# Patient Record
Sex: MALE | Race: WHITE | NOT HISPANIC OR LATINO
[De-identification: names, ages, dates, MRNs, and addresses within clinical notes are randomized per-mention and may not be internally consistent; named-entity substitution may affect disease eponyms.]

---

## 2017-01-09 ENCOUNTER — APPOINTMENT (OUTPATIENT)
Dept: ELECTROPHYSIOLOGY | Facility: CLINIC | Age: 82
End: 2017-01-09

## 2017-03-31 ENCOUNTER — RX RENEWAL (OUTPATIENT)
Age: 82
End: 2017-03-31

## 2017-04-03 ENCOUNTER — APPOINTMENT (OUTPATIENT)
Dept: ELECTROPHYSIOLOGY | Facility: CLINIC | Age: 82
End: 2017-04-03

## 2017-04-24 ENCOUNTER — APPOINTMENT (OUTPATIENT)
Dept: ELECTROPHYSIOLOGY | Facility: CLINIC | Age: 82
End: 2017-04-24

## 2017-04-24 VITALS
OXYGEN SATURATION: 95 % | BODY MASS INDEX: 27.36 KG/M2 | SYSTOLIC BLOOD PRESSURE: 136 MMHG | DIASTOLIC BLOOD PRESSURE: 82 MMHG | WEIGHT: 202 LBS | HEIGHT: 72 IN | HEART RATE: 70 BPM

## 2017-07-13 ENCOUNTER — APPOINTMENT (OUTPATIENT)
Dept: ELECTROPHYSIOLOGY | Facility: CLINIC | Age: 82
End: 2017-07-13

## 2017-07-27 ENCOUNTER — APPOINTMENT (OUTPATIENT)
Dept: ELECTROPHYSIOLOGY | Facility: CLINIC | Age: 82
End: 2017-07-27
Payer: MEDICARE

## 2017-07-27 PROCEDURE — 93295 DEV INTERROG REMOTE 1/2/MLT: CPT

## 2017-09-13 ENCOUNTER — INPATIENT (INPATIENT)
Facility: HOSPITAL | Age: 82
LOS: 14 days | Discharge: HOME | End: 2017-09-28
Attending: SURGERY | Admitting: SURGERY
Payer: COMMERCIAL

## 2017-09-13 VITALS
WEIGHT: 194.01 LBS | OXYGEN SATURATION: 98 % | SYSTOLIC BLOOD PRESSURE: 131 MMHG | DIASTOLIC BLOOD PRESSURE: 81 MMHG | RESPIRATION RATE: 16 BRPM | HEIGHT: 72 IN | TEMPERATURE: 98 F | HEART RATE: 71 BPM

## 2017-09-13 LAB
APPEARANCE UR: CLEAR — SIGNIFICANT CHANGE UP
BASOPHILS # BLD AUTO: 0.1 K/UL — SIGNIFICANT CHANGE UP (ref 0–0.2)
BASOPHILS NFR BLD AUTO: 1.6 % — SIGNIFICANT CHANGE UP (ref 0–2)
BILIRUB UR-MCNC: ABNORMAL
COLOR SPEC: YELLOW — SIGNIFICANT CHANGE UP
DIFF PNL FLD: NEGATIVE — SIGNIFICANT CHANGE UP
EOSINOPHIL # BLD AUTO: 0.7 K/UL — HIGH (ref 0–0.5)
EOSINOPHIL NFR BLD AUTO: 8.8 % — HIGH (ref 0–6)
GLUCOSE UR QL: NEGATIVE MG/DL — SIGNIFICANT CHANGE UP
HCT VFR BLD CALC: 45 % — SIGNIFICANT CHANGE UP (ref 39–50)
HGB BLD-MCNC: 14.8 G/DL — SIGNIFICANT CHANGE UP (ref 13–17)
KETONES UR-MCNC: NEGATIVE — SIGNIFICANT CHANGE UP
LEUKOCYTE ESTERASE UR-ACNC: ABNORMAL
LYMPHOCYTES # BLD AUTO: 1.1 K/UL — SIGNIFICANT CHANGE UP (ref 1–3.3)
LYMPHOCYTES # BLD AUTO: 14.4 % — SIGNIFICANT CHANGE UP (ref 13–44)
MCHC RBC-ENTMCNC: 31.7 PG — SIGNIFICANT CHANGE UP (ref 27–34)
MCHC RBC-ENTMCNC: 33 GM/DL — SIGNIFICANT CHANGE UP (ref 32–36)
MCV RBC AUTO: 96.1 FL — SIGNIFICANT CHANGE UP (ref 80–100)
MONOCYTES # BLD AUTO: 1.1 K/UL — HIGH (ref 0–0.9)
MONOCYTES NFR BLD AUTO: 13.7 % — SIGNIFICANT CHANGE UP (ref 2–14)
NEUTROPHILS # BLD AUTO: 4.7 K/UL — SIGNIFICANT CHANGE UP (ref 1.8–7.4)
NEUTROPHILS NFR BLD AUTO: 61.5 % — SIGNIFICANT CHANGE UP (ref 43–77)
NITRITE UR-MCNC: NEGATIVE — SIGNIFICANT CHANGE UP
PH UR: 5 — SIGNIFICANT CHANGE UP (ref 5–8)
PLATELET # BLD AUTO: 179 K/UL — SIGNIFICANT CHANGE UP (ref 150–400)
PROT UR-MCNC: 30 MG/DL
RBC # BLD: 4.68 M/UL — SIGNIFICANT CHANGE UP (ref 4.2–5.8)
RBC # FLD: 13.1 % — SIGNIFICANT CHANGE UP (ref 11–15)
SP GR SPEC: 1.01 — SIGNIFICANT CHANGE UP (ref 1.01–1.02)
UROBILINOGEN FLD QL: 4 MG/DL
WBC # BLD: 7.7 K/UL — SIGNIFICANT CHANGE UP (ref 3.8–10.5)
WBC # FLD AUTO: 7.7 K/UL — SIGNIFICANT CHANGE UP (ref 3.8–10.5)

## 2017-09-13 PROCEDURE — 99285 EMERGENCY DEPT VISIT HI MDM: CPT

## 2017-09-13 NOTE — ED PROVIDER NOTE - MEDICAL DECISION MAKING DETAILS
general abd pain/epigastric after food with bad taste and sometimes burning, likely gerd. risk factors acs. less likely anginal equivalent. will do ekg, 1 trop, labs, ct, maalox, reassess. if wnl, willl likely d/c with gerd treatment and very close f/u for further testing including stress with pcp.

## 2017-09-13 NOTE — ED PROVIDER NOTE - OBJECTIVE STATEMENT
80 y/o male hx aicd/pacemaker, htn, hld, cad/mi sent from urgent care for ct because pt with intermittent epigastric pain and generalized abdominal discomfort lasting for 1-2 hours per day over past three days, more after eating. 3 days ago with diaphoresis after eating and discomfort that improved. Last had any discomfort ~ 7 hours PTA. No n/v/diarrhea/brbpr/melena. no hx colonoscopy/endoscopy, nsaids/steroids. Pt with hx of reflux. states does get funny taste in mouth with some burning. no cp, sob, diaphoresis.

## 2017-09-13 NOTE — ED ADULT NURSE NOTE - OBJECTIVE STATEMENT
80 yo male c/o abd pain c diaphoresis, intermittent, periumbilical, resolved spontaneously pta. began Monday, marisol Pt was sent here from his physician to have a CT for abd. pain.

## 2017-09-13 NOTE — ED ADULT TRIAGE NOTE - CHIEF COMPLAINT QUOTE
Pt was sent here from his physician to have a CT for pain in the center of his umbilicus. Pt states that the pain has occurred intermittently after eating food. Pt states the pain lasts about an hour and a half, it dissipates and then goes away, but it returns when he eats sometimes.

## 2017-09-13 NOTE — ED ADULT NURSE NOTE - CHPI ED SYMPTOMS NEG
no hematuria/no blood in stool/no fever/no chills/no nausea/no vomiting/no burning urination/no dysuria/no abdominal distension/no diarrhea

## 2017-09-13 NOTE — ED PROVIDER NOTE - CARDIAC, MLM
Normal rate, regular rhythm.  Heart sounds S1, S2.  No murmurs, rubs or gallops. left wall with aicd.

## 2017-09-13 NOTE — ED PROVIDER NOTE - PMH
BPH (Benign Prostatic Hyperplasia)    CAD (coronary artery disease)  last cath done in 1991- (no intervention, locations of lesions unknown)  Diabetes mellitus type 2, diet-controlled  diagnosed two years, diet controlled  Dyslipidemia    Hypertension    Myocardial infarct  in 1990, 1991  Pneumonia  hospitliazed two years ago on IV antibiotics for 10 days

## 2017-09-13 NOTE — ED PROVIDER NOTE - PROGRESS NOTE DETAILS
Miranda: pt to be admitted to surgery Dr. Sheffield, he is aware, zosyn for ?possible cholecystitis though needs further work up, pt not acutely infectiously sick, HD stable, no white count or fever.

## 2017-09-14 DIAGNOSIS — I25.10 ATHEROSCLEROTIC HEART DISEASE OF NATIVE CORONARY ARTERY WITHOUT ANGINA PECTORIS: ICD-10-CM

## 2017-09-14 DIAGNOSIS — R79.89 OTHER SPECIFIED ABNORMAL FINDINGS OF BLOOD CHEMISTRY: ICD-10-CM

## 2017-09-14 DIAGNOSIS — I50.22 CHRONIC SYSTOLIC (CONGESTIVE) HEART FAILURE: ICD-10-CM

## 2017-09-14 DIAGNOSIS — I10 ESSENTIAL (PRIMARY) HYPERTENSION: ICD-10-CM

## 2017-09-14 DIAGNOSIS — E11.9 TYPE 2 DIABETES MELLITUS WITHOUT COMPLICATIONS: ICD-10-CM

## 2017-09-14 DIAGNOSIS — N40.0 BENIGN PROSTATIC HYPERPLASIA WITHOUT LOWER URINARY TRACT SYMPTOMS: ICD-10-CM

## 2017-09-14 DIAGNOSIS — Z98.890 OTHER SPECIFIED POSTPROCEDURAL STATES: Chronic | ICD-10-CM

## 2017-09-14 DIAGNOSIS — I21.3 ST ELEVATION (STEMI) MYOCARDIAL INFARCTION OF UNSPECIFIED SITE: ICD-10-CM

## 2017-09-14 DIAGNOSIS — K81.9 CHOLECYSTITIS, UNSPECIFIED: ICD-10-CM

## 2017-09-14 LAB
ALBUMIN SERPL ELPH-MCNC: 3.4 G/DL — SIGNIFICANT CHANGE UP (ref 3.3–5)
ALBUMIN SERPL ELPH-MCNC: 3.6 G/DL — SIGNIFICANT CHANGE UP (ref 3.3–5)
ALP SERPL-CCNC: 219 U/L — HIGH (ref 40–120)
ALP SERPL-CCNC: 230 U/L — HIGH (ref 40–120)
ALT FLD-CCNC: 289 U/L — HIGH (ref 12–78)
ALT FLD-CCNC: 344 U/L — HIGH (ref 12–78)
ANION GAP SERPL CALC-SCNC: 11 MMOL/L — SIGNIFICANT CHANGE UP (ref 5–17)
ANION GAP SERPL CALC-SCNC: 7 MMOL/L — SIGNIFICANT CHANGE UP (ref 5–17)
APTT BLD: 30.6 SEC — SIGNIFICANT CHANGE UP (ref 27.5–37.4)
AST SERPL-CCNC: 144 U/L — HIGH (ref 15–37)
AST SERPL-CCNC: 248 U/L — HIGH (ref 15–37)
BILIRUB DIRECT SERPL-MCNC: 0.9 MG/DL — HIGH (ref 0.05–0.2)
BILIRUB INDIRECT FLD-MCNC: 2.3 MG/DL — HIGH (ref 0.2–1)
BILIRUB SERPL-MCNC: 3.2 MG/DL — HIGH (ref 0.2–1.2)
BILIRUB SERPL-MCNC: 4.9 MG/DL — HIGH (ref 0.2–1.2)
BLD GP AB SCN SERPL QL: SIGNIFICANT CHANGE UP
BUN SERPL-MCNC: 20 MG/DL — SIGNIFICANT CHANGE UP (ref 7–23)
BUN SERPL-MCNC: 22 MG/DL — SIGNIFICANT CHANGE UP (ref 7–23)
CALCIUM SERPL-MCNC: 8.5 MG/DL — SIGNIFICANT CHANGE UP (ref 8.5–10.1)
CALCIUM SERPL-MCNC: 8.8 MG/DL — SIGNIFICANT CHANGE UP (ref 8.5–10.1)
CANCER AG19-9 SERPL-ACNC: 68.5 U/ML — HIGH
CEA SERPL-MCNC: 1.5 NG/ML — SIGNIFICANT CHANGE UP (ref 0–3.8)
CHLORIDE SERPL-SCNC: 103 MMOL/L — SIGNIFICANT CHANGE UP (ref 96–108)
CHLORIDE SERPL-SCNC: 106 MMOL/L — SIGNIFICANT CHANGE UP (ref 96–108)
CK MB CFR SERPL CALC: 1.9 NG/ML — SIGNIFICANT CHANGE UP (ref 0.5–3.6)
CO2 SERPL-SCNC: 26 MMOL/L — SIGNIFICANT CHANGE UP (ref 22–31)
CO2 SERPL-SCNC: 27 MMOL/L — SIGNIFICANT CHANGE UP (ref 22–31)
CREAT SERPL-MCNC: 1.32 MG/DL — HIGH (ref 0.5–1.3)
CREAT SERPL-MCNC: 1.38 MG/DL — HIGH (ref 0.5–1.3)
GLUCOSE SERPL-MCNC: 104 MG/DL — HIGH (ref 70–99)
GLUCOSE SERPL-MCNC: 122 MG/DL — HIGH (ref 70–99)
HCT VFR BLD CALC: 46.4 % — SIGNIFICANT CHANGE UP (ref 39–50)
HGB BLD-MCNC: 15.2 G/DL — SIGNIFICANT CHANGE UP (ref 13–17)
INR BLD: 1.14 RATIO — SIGNIFICANT CHANGE UP (ref 0.88–1.16)
LIDOCAIN IGE QN: 269 U/L — SIGNIFICANT CHANGE UP (ref 73–393)
MCHC RBC-ENTMCNC: 31.3 PG — SIGNIFICANT CHANGE UP (ref 27–34)
MCHC RBC-ENTMCNC: 32.6 GM/DL — SIGNIFICANT CHANGE UP (ref 32–36)
MCV RBC AUTO: 96 FL — SIGNIFICANT CHANGE UP (ref 80–100)
PLATELET # BLD AUTO: 164 K/UL — SIGNIFICANT CHANGE UP (ref 150–400)
POTASSIUM SERPL-MCNC: 3.8 MMOL/L — SIGNIFICANT CHANGE UP (ref 3.5–5.3)
POTASSIUM SERPL-MCNC: 4 MMOL/L — SIGNIFICANT CHANGE UP (ref 3.5–5.3)
POTASSIUM SERPL-SCNC: 3.8 MMOL/L — SIGNIFICANT CHANGE UP (ref 3.5–5.3)
POTASSIUM SERPL-SCNC: 4 MMOL/L — SIGNIFICANT CHANGE UP (ref 3.5–5.3)
PROT SERPL-MCNC: 7.1 GM/DL — SIGNIFICANT CHANGE UP (ref 6–8.3)
PROT SERPL-MCNC: 7.6 GM/DL — SIGNIFICANT CHANGE UP (ref 6–8.3)
PROTHROM AB SERPL-ACNC: 12.5 SEC — SIGNIFICANT CHANGE UP (ref 9.8–12.7)
RBC # BLD: 4.84 M/UL — SIGNIFICANT CHANGE UP (ref 4.2–5.8)
RBC # FLD: 13.2 % — SIGNIFICANT CHANGE UP (ref 11–15)
SODIUM SERPL-SCNC: 140 MMOL/L — SIGNIFICANT CHANGE UP (ref 135–145)
SODIUM SERPL-SCNC: 140 MMOL/L — SIGNIFICANT CHANGE UP (ref 135–145)
TROPONIN I SERPL-MCNC: 0.04 NG/ML — SIGNIFICANT CHANGE UP (ref 0.01–0.04)
WBC # BLD: 6.1 K/UL — SIGNIFICANT CHANGE UP (ref 3.8–10.5)
WBC # FLD AUTO: 6.1 K/UL — SIGNIFICANT CHANGE UP (ref 3.8–10.5)

## 2017-09-14 PROCEDURE — 99223 1ST HOSP IP/OBS HIGH 75: CPT

## 2017-09-14 PROCEDURE — 78226 HEPATOBILIARY SYSTEM IMAGING: CPT | Mod: 26

## 2017-09-14 PROCEDURE — 74177 CT ABD & PELVIS W/CONTRAST: CPT | Mod: 26

## 2017-09-14 PROCEDURE — 93010 ELECTROCARDIOGRAM REPORT: CPT

## 2017-09-14 RX ORDER — VALSARTAN 80 MG/1
80 TABLET ORAL DAILY
Qty: 0 | Refills: 0 | Status: DISCONTINUED | OUTPATIENT
Start: 2017-09-14 | End: 2017-09-18

## 2017-09-14 RX ORDER — PIPERACILLIN AND TAZOBACTAM 4; .5 G/20ML; G/20ML
3.38 INJECTION, POWDER, LYOPHILIZED, FOR SOLUTION INTRAVENOUS ONCE
Qty: 0 | Refills: 0 | Status: COMPLETED | OUTPATIENT
Start: 2017-09-14 | End: 2017-09-14

## 2017-09-14 RX ORDER — MORPHINE SULFATE 50 MG/1
3 CAPSULE, EXTENDED RELEASE ORAL ONCE
Qty: 0 | Refills: 0 | Status: DISCONTINUED | OUTPATIENT
Start: 2017-09-14 | End: 2017-09-14

## 2017-09-14 RX ORDER — SODIUM CHLORIDE 9 MG/ML
1000 INJECTION INTRAMUSCULAR; INTRAVENOUS; SUBCUTANEOUS
Qty: 0 | Refills: 0 | Status: DISCONTINUED | OUTPATIENT
Start: 2017-09-14 | End: 2017-09-14

## 2017-09-14 RX ORDER — PANTOPRAZOLE SODIUM 20 MG/1
40 TABLET, DELAYED RELEASE ORAL
Qty: 0 | Refills: 0 | Status: DISCONTINUED | OUTPATIENT
Start: 2017-09-14 | End: 2017-09-18

## 2017-09-14 RX ORDER — TAMSULOSIN HYDROCHLORIDE 0.4 MG/1
0.4 CAPSULE ORAL AT BEDTIME
Qty: 0 | Refills: 0 | Status: DISCONTINUED | OUTPATIENT
Start: 2017-09-14 | End: 2017-09-18

## 2017-09-14 RX ORDER — DEXTROSE 50 % IN WATER 50 %
12.5 SYRINGE (ML) INTRAVENOUS ONCE
Qty: 0 | Refills: 0 | Status: DISCONTINUED | OUTPATIENT
Start: 2017-09-14 | End: 2017-09-18

## 2017-09-14 RX ORDER — ONDANSETRON 8 MG/1
4 TABLET, FILM COATED ORAL EVERY 6 HOURS
Qty: 0 | Refills: 0 | Status: DISCONTINUED | OUTPATIENT
Start: 2017-09-14 | End: 2017-09-18

## 2017-09-14 RX ORDER — PIPERACILLIN AND TAZOBACTAM 4; .5 G/20ML; G/20ML
3.38 INJECTION, POWDER, LYOPHILIZED, FOR SOLUTION INTRAVENOUS EVERY 8 HOURS
Qty: 0 | Refills: 0 | Status: DISCONTINUED | OUTPATIENT
Start: 2017-09-14 | End: 2017-09-18

## 2017-09-14 RX ORDER — INSULIN LISPRO 100/ML
VIAL (ML) SUBCUTANEOUS AT BEDTIME
Qty: 0 | Refills: 0 | Status: DISCONTINUED | OUTPATIENT
Start: 2017-09-14 | End: 2017-09-18

## 2017-09-14 RX ORDER — GLUCAGON INJECTION, SOLUTION 0.5 MG/.1ML
1 INJECTION, SOLUTION SUBCUTANEOUS ONCE
Qty: 0 | Refills: 0 | Status: DISCONTINUED | OUTPATIENT
Start: 2017-09-14 | End: 2017-09-18

## 2017-09-14 RX ORDER — HYDROCHLOROTHIAZIDE 25 MG
12.5 TABLET ORAL AT BEDTIME
Qty: 0 | Refills: 0 | Status: DISCONTINUED | OUTPATIENT
Start: 2017-09-14 | End: 2017-09-18

## 2017-09-14 RX ORDER — ASPIRIN/CALCIUM CARB/MAGNESIUM 324 MG
81 TABLET ORAL DAILY
Qty: 0 | Refills: 0 | Status: DISCONTINUED | OUTPATIENT
Start: 2017-09-14 | End: 2017-09-18

## 2017-09-14 RX ORDER — ATORVASTATIN CALCIUM 80 MG/1
10 TABLET, FILM COATED ORAL AT BEDTIME
Qty: 0 | Refills: 0 | Status: DISCONTINUED | OUTPATIENT
Start: 2017-09-14 | End: 2017-09-18

## 2017-09-14 RX ORDER — INSULIN LISPRO 100/ML
VIAL (ML) SUBCUTANEOUS
Qty: 0 | Refills: 0 | Status: DISCONTINUED | OUTPATIENT
Start: 2017-09-14 | End: 2017-09-18

## 2017-09-14 RX ORDER — HEPARIN SODIUM 5000 [USP'U]/ML
5000 INJECTION INTRAVENOUS; SUBCUTANEOUS EVERY 12 HOURS
Qty: 0 | Refills: 0 | Status: DISCONTINUED | OUTPATIENT
Start: 2017-09-14 | End: 2017-09-18

## 2017-09-14 RX ORDER — DEXTROSE 50 % IN WATER 50 %
25 SYRINGE (ML) INTRAVENOUS ONCE
Qty: 0 | Refills: 0 | Status: DISCONTINUED | OUTPATIENT
Start: 2017-09-14 | End: 2017-09-18

## 2017-09-14 RX ORDER — DEXTROSE 50 % IN WATER 50 %
1 SYRINGE (ML) INTRAVENOUS ONCE
Qty: 0 | Refills: 0 | Status: DISCONTINUED | OUTPATIENT
Start: 2017-09-14 | End: 2017-09-18

## 2017-09-14 RX ORDER — ACETAMINOPHEN 500 MG
650 TABLET ORAL EVERY 6 HOURS
Qty: 0 | Refills: 0 | Status: DISCONTINUED | OUTPATIENT
Start: 2017-09-14 | End: 2017-09-18

## 2017-09-14 RX ORDER — CARVEDILOL PHOSPHATE 80 MG/1
12.5 CAPSULE, EXTENDED RELEASE ORAL EVERY 12 HOURS
Qty: 0 | Refills: 0 | Status: DISCONTINUED | OUTPATIENT
Start: 2017-09-14 | End: 2017-09-18

## 2017-09-14 RX ORDER — SODIUM CHLORIDE 9 MG/ML
1000 INJECTION, SOLUTION INTRAVENOUS
Qty: 0 | Refills: 0 | Status: DISCONTINUED | OUTPATIENT
Start: 2017-09-14 | End: 2017-09-18

## 2017-09-14 RX ADMIN — TAMSULOSIN HYDROCHLORIDE 0.4 MILLIGRAM(S): 0.4 CAPSULE ORAL at 22:41

## 2017-09-14 RX ADMIN — PIPERACILLIN AND TAZOBACTAM 25 GRAM(S): 4; .5 INJECTION, POWDER, LYOPHILIZED, FOR SOLUTION INTRAVENOUS at 22:41

## 2017-09-14 RX ADMIN — ATORVASTATIN CALCIUM 10 MILLIGRAM(S): 80 TABLET, FILM COATED ORAL at 22:41

## 2017-09-14 RX ADMIN — SODIUM CHLORIDE 30 MILLILITER(S): 9 INJECTION, SOLUTION INTRAVENOUS at 17:03

## 2017-09-14 RX ADMIN — MORPHINE SULFATE 3 MILLIGRAM(S): 50 CAPSULE, EXTENDED RELEASE ORAL at 10:58

## 2017-09-14 RX ADMIN — Medication 12.5 MILLIGRAM(S): at 22:41

## 2017-09-14 RX ADMIN — HEPARIN SODIUM 5000 UNIT(S): 5000 INJECTION INTRAVENOUS; SUBCUTANEOUS at 17:02

## 2017-09-14 RX ADMIN — CARVEDILOL PHOSPHATE 12.5 MILLIGRAM(S): 80 CAPSULE, EXTENDED RELEASE ORAL at 17:02

## 2017-09-14 RX ADMIN — HEPARIN SODIUM 5000 UNIT(S): 5000 INJECTION INTRAVENOUS; SUBCUTANEOUS at 05:10

## 2017-09-14 RX ADMIN — PANTOPRAZOLE SODIUM 40 MILLIGRAM(S): 20 TABLET, DELAYED RELEASE ORAL at 08:14

## 2017-09-14 RX ADMIN — PIPERACILLIN AND TAZOBACTAM 25 GRAM(S): 4; .5 INJECTION, POWDER, LYOPHILIZED, FOR SOLUTION INTRAVENOUS at 14:20

## 2017-09-14 RX ADMIN — PIPERACILLIN AND TAZOBACTAM 200 GRAM(S): 4; .5 INJECTION, POWDER, LYOPHILIZED, FOR SOLUTION INTRAVENOUS at 05:06

## 2017-09-14 RX ADMIN — VALSARTAN 80 MILLIGRAM(S): 80 TABLET ORAL at 05:07

## 2017-09-14 RX ADMIN — MORPHINE SULFATE 3 MILLIGRAM(S): 50 CAPSULE, EXTENDED RELEASE ORAL at 14:00

## 2017-09-14 RX ADMIN — SODIUM CHLORIDE 30 MILLILITER(S): 9 INJECTION, SOLUTION INTRAVENOUS at 05:51

## 2017-09-14 RX ADMIN — CARVEDILOL PHOSPHATE 12.5 MILLIGRAM(S): 80 CAPSULE, EXTENDED RELEASE ORAL at 05:07

## 2017-09-14 NOTE — H&P ADULT - ATTENDING COMMENTS
I examined patient, and agree with H&P.   Patient to be treated accordingly to his symptoms and findings.  Will Follow up new labs and images  requested.  Plan discussed with patient.

## 2017-09-14 NOTE — H&P ADULT - HISTORY OF PRESENT ILLNESS
81 y/o male PMHx AICD/pacemaker, HTN, HLD, CAD/MI, acid reflux, BPH with intermittent epigastric discomfort with food intake lasting for 1-2 hours per day over past three days. Slight nausea earlier yesterday. Denies abdominal pain or discomfort now. Last BM was yesterday and normal. Patient last ate at 4pm. No hx colonoscopy/endoscopy. Patient denies fever, chills, nausea, vomiting, constipation, diarrhea, hematuria, melena, hematochezia, chest pain, shortness of breath, dizziness, palpitations. Patient denies prior incident. 81 y/o male PMHx AICD/pacemaker, HTN, HLD, CAD/MI, DM, acid reflux, BPH with intermittent epigastric discomfort with food intake lasting for 1-2 hours per day over past three days. Slight nausea earlier yesterday. Denies abdominal pain or discomfort now. Last BM was yesterday and normal. Patient last ate at 4pm. No hx colonoscopy/endoscopy. Patient denies fever, chills, nausea, vomiting, constipation, diarrhea, hematuria, melena, hematochezia, chest pain, shortness of breath, dizziness, palpitations. Patient denies prior incident. 83 y/o male PMHx AICD/pacemaker, HTN, HLD, CAD/MI, DM, EF of 25%, acid reflux, BPH with intermittent epigastric discomfort with food intake lasting for 1-2 hours per day over past three days. Slight nausea earlier yesterday. Denies abdominal pain or discomfort now. Last BM was yesterday and normal. Patient last ate at 4pm. No hx colonoscopy/endoscopy. Patient denies fever, chills, nausea, vomiting, constipation, diarrhea, hematuria, melena, hematochezia, chest pain, shortness of breath, dizziness, palpitations. Patient denies prior incident.

## 2017-09-14 NOTE — H&P ADULT - NEGATIVE GASTROINTESTINAL SYMPTOMS
no melena/no diarrhea/no abdominal pain/no jaundice/no constipation/no hematochezia/no nausea/no vomiting

## 2017-09-14 NOTE — H&P ADULT - MUSCULOSKELETAL
No joint pain, swelling or deformity; no limitation of movement details… lab results/return to ED if symptoms worsen, persist or questions arise/need for outpatient follow-up

## 2017-09-14 NOTE — H&P ADULT - NSHPLABSRESULTS_GEN_ALL_CORE
Vital Signs Last 24 Hrs  T(C): 36.6 (13 Sep 2017 23:42), Max: 36.8 (13 Sep 2017 20:27)  T(F): 97.9 (13 Sep 2017 23:42), Max: 98.2 (13 Sep 2017 20:27)  HR: 70 (13 Sep 2017 23:42) (70 - 71)  BP: 148/76 (13 Sep 2017 23:42) (131/81 - 148/76)  BP(mean): --  RR: 18 (13 Sep 2017 23:42) (16 - 18)  SpO2: 98% (13 Sep 2017 23:42) (98% - 98%)                        14.8   7.7   )-----------( 179      ( 13 Sep 2017 23:25 )             45.0   09-13    140  |  103  |  22  ----------------------------<  104<H>  3.8   |  26  |  1.32<H>    Ca    8.8      13 Sep 2017 23:25    TPro  7.6  /  Alb  3.6  /  TBili  4.9<H>  /  DBili  x   /  AST  248<H>  /  ALT  344<H>  /  AlkPhos  230<H>  09-13    CT Abdomen and Pelvis w/ IV Cont (09.14.17 @ 00:54)   IMPRESSION:  Cholelithiasis with marked irregular noncircumferential thickening of the   gallbladder walls. No hyperemia of the adjacent liver. No surrounding   adenopathy. Considerations include acute on chronic cholecystitis or   gallbladder neoplasm. MRI of the abdomen is recommended for further   evaluation.  Colonicdiverticulosis without evidence of diverticulitis.

## 2017-09-14 NOTE — CONSULT NOTE ADULT - SUBJECTIVE AND OBJECTIVE BOX
Chief Complaint:  Patient is a 82y old  Male who presents with a chief complaint of intermittent epigastric discomfort with food intake since Monday (14 Sep 2017 03:20)      HPI:83 yo WM with hx of HTN, CAD, NIDDM, EF 25% admitted with progressive abdominal pain post prandial  and found to have a abnormal CT scan with ? GB mass     Allergies:  No Known Allergies      Medications:  aluminum hydroxide/magnesium hydroxide/simethicone Suspension 30 milliLiter(s) Oral every 4 hours PRN  heparin  Injectable 5000 Unit(s) SubCutaneous every 12 hours  acetaminophen   Tablet 650 milliGRAM(s) Oral every 6 hours PRN  acetaminophen   Tablet. 650 milliGRAM(s) Oral every 6 hours PRN  ondansetron Injectable 4 milliGRAM(s) IV Push every 6 hours PRN  piperacillin/tazobactam IVPB. 3.375 Gram(s) IV Intermittent every 8 hours  aspirin  chewable 81 milliGRAM(s) Oral daily  tamsulosin 0.4 milliGRAM(s) Oral at bedtime  atorvastatin 10 milliGRAM(s) Oral at bedtime  hydrochlorothiazide 12.5 milliGRAM(s) Oral at bedtime  valsartan 80 milliGRAM(s) Oral daily  carvedilol 12.5 milliGRAM(s) Oral every 12 hours  pantoprazole    Tablet 40 milliGRAM(s) Oral before breakfast  insulin lispro (HumaLOG) corrective regimen sliding scale   SubCutaneous three times a day before meals  insulin lispro (HumaLOG) corrective regimen sliding scale   SubCutaneous at bedtime  dextrose 5%. 1000 milliLiter(s) IV Continuous <Continuous>  dextrose Gel 1 Dose(s) Oral once PRN  dextrose 50% Injectable 12.5 Gram(s) IV Push once  dextrose 50% Injectable 25 Gram(s) IV Push once  dextrose 50% Injectable 25 Gram(s) IV Push once  glucagon  Injectable 1 milliGRAM(s) IntraMuscular once PRN  dextrose 5% + sodium chloride 0.45%. 1000 milliLiter(s) IV Continuous <Continuous>      Home Medications:   * Incomplete Medication History as of 13-Sep-2017 23:18 documented in Structured Notes  · 	aspirin 81 mg oral tablet, chewable: 1 tab(s) orally once a day, Last Dose Taken:    · 	tamsulosin 0.4 mg oral capsule: 1 cap(s) orally once a day, Last Dose Taken: 12-Sep-2017 PM  · 	carvedilol 12.5 mg oral tablet: 1 tab(s) orally 2 times a day, Last Dose Taken: 13-Sep-2017 AM  · 	pravastatin 20 mg oral tablet: 1 tab(s) orally once a day (at bedtime), Last Dose Taken: 12-Sep-2017 PM  · 	hydrochlorothiazide-valsartan 12.5 mg-80 mg oral tablet: 1 tab(s) orally once a day, Last Dose Taken: 13-Sep-2017 AM  · 	omeprazole:  orally , Last Dose Taken: 10-Sep-2017     PMHX/PSHX:  Pneumonia  CAD (coronary artery disease)  AICD/PPM  Myocardial infarct  BPH (Benign Prostatic Hyperplasia)  Diabetes mellitus type 2, diet-controlled  Dyslipidemia  Hypertension  H/O prostate biopsy  Skin lesion of right leg      Family history:  No pertinent family history in first degree relatives      Social History: (-) ETOH (-) TOB    ROS:     General:  No wt loss, fevers, chills, night sweats, fatigue,   Eyes:  Good vision, no reported pain  ENT:  No sore throat, pain, runny nose, dysphagia  CV:  No pain, palpitations, hypo/hypertension  Resp:  No dyspnea, cough, tachypnea, wheezing  GI:  +pain, +nausea, +vomiting, No diarrhea, No constipation, No weight loss, No fever, No pruritis, No rectal bleeding, No tarry stools, No dysphagia,  :  No pain, bleeding, incontinence, nocturia  Muscle:  No pain, weakness  Breast:  No pain, abscess, mass, discharge  Neuro:  No weakness, tingling, memory problems  Psych:  No fatigue, insomnia, mood problems, depression  Endocrine:  No polyuria, polydipsia, cold/heat intolerance  Heme:  No petechiae, ecchymosis, easy bruisability  Skin:  No rash, tattoos, scars, edema      PHYSICAL EXAM:   Vital Signs:  Vital Signs Last 24 Hrs  T(C): 36.5 (14 Sep 2017 16:10), Max: 37.2 (14 Sep 2017 09:09)  T(F): 97.7 (14 Sep 2017 16:10), Max: 99 (14 Sep 2017 09:09)  HR: 68 (14 Sep 2017 16:10) (60 - 72)  BP: 124/74 (14 Sep 2017 16:10) (105/63 - 148/76)  BP(mean): --  RR: 15 (14 Sep 2017 16:10) (14 - 18)  SpO2: 96% (14 Sep 2017 16:10) (96% - 98%)  Daily Height in cm: 187.96 (14 Sep 2017 09:09)    Daily     GENERAL:  Appears stated age, well-groomed, well-nourished, no distress  HEENT:  NC/AT,  conjunctivae clear and pink, no thyromegaly, nodules, adenopathy, no JVD, sclera -anicteric  CHEST:  Full & symmetric excursion, no increased effort, breath sounds clear  HEART:  Regular rhythm, S1, S2, no murmur/rub/S3/S4, no abdominal bruit, no edema  ABDOMEN:  Soft, +RUQ -tender, non-distended, normoactive bowel sounds,  no masses , no hepatosplenomegaly, no signs of chronic liver disease  EXTREMITIES:  no cyanosis, clubbing or edema  SKIN:  No rash/erythema/ecchymoses/petechiae/wounds/abscess/warm/dry  NEURO:  Alert, oriented, no asterixis, no tremor, no encephalopathy  RECTAL : Deferred    LABS:                        15.2   6.1   )-----------( 164      ( 14 Sep 2017 08:08 )             46.4     -    140  |  106  |  20  ----------------------------<  122<H>  4.0   |  27  |  1.38<H>    Ca    8.5      14 Sep 2017 08:08    TPro  7.1  /  Alb  3.4  /  TBili  3.2<H>  /  DBili  .90<H>  /  AST  144<H>  /  ALT  289<H>  /  AlkPhos  219<H>      LIVER FUNCTIONS - ( 14 Sep 2017 08:08 )  Alb: 3.4 g/dL / Pro: 7.1 gm/dL / ALK PHOS: 219 U/L / ALT: 289 U/L / AST: 144 U/L / GGT: x           PT/INR - ( 14 Sep 2017 04:49 )   PT: 12.5 sec;   INR: 1.14 ratio         PTT - ( 14 Sep 2017 04:49 )  PTT:30.6 sec  Urinalysis Basic - ( 13 Sep 2017 23:27 )    Color: Yellow / Appearance: Clear / S.015 / pH: x  Gluc: x / Ketone: Negative  / Bili: Small / Urobili: 4 mg/dL   Blood: x / Protein: 30 mg/dL / Nitrite: Negative   Leuk Esterase: Trace / RBC: x / WBC 0-2   Sq Epi: x / Non Sq Epi: x / Bacteria: x      Carcinoembryonic Antigen: 1.5:   Cancer Antigen, GI Ca 19-9:  68.5:     Lipase serum 269          Imaging:  < from: CT Abdomen and Pelvis w/ IV Cont (17 @ 00:54) >  EXAM:  CT ABDOMEN AND PELVIS IC                            PROCEDURE DATE:  2017          INTERPRETATION:  CT ABDOMEN AND PELVIS DATED 2017.    CLINICAL INFORMATION:  82-year-old male with generalized abdominal pain.    TECHNIQUE:  AxialCT images through the abdomen and pelvis are acquired   with administration of intravenous contrast. Images are reformatted in   the sagittal and coronal planes. 85cc of Omnipaque 350 were administered   without event.  15cc were discarded.    The study is correlated with a prior exam from 2015.    FINDINGS:    There is mild bibasilar dependent and platelike subsegmental atelectasis.   The heart is mildly enlarged. Pacemaker leads are present in the right   ventricle.    The liver is unremarkable in appearance. There is cholelithiasis with   marked irregular noncircumferential thickening of the gallbladder walls.   There is no hyperemia of the adjacent liver. There is no surrounding   adenopathy. There is no biliary ductal dilatation. The pancreas, spleen,   adrenal glands, and kidneys are unremarkable apart from bilateral renal   cysts. The urinary bladder is under distended but unremarkable in   appearance. The prostate gland is not enlarged.    There is a small hiatus hernia. There is no bowel obstruction. There is   colonic diverticulosis without evidence of diverticulitis. The appendix   is normal. There is no pneumoperitoneum, ascites, or loculated collection.    There is no significant abdominal or pelvic lymphadenopathy. There is   heavy vascular atherosclerotic calcification of the aortoiliac tree and   abdominal aortic branches.    There is a small fat-containing umbilical and small fat-containing   bilateral inguinal hernias.    There are multilevel degenerative changes of the spine with multilevel   moderate to severe segmental canal stenosis and neural foraminal   stenosis; findings similar to the prior exam from 2015.    IMPRESSION:    Cholelithiasis with marked irregular noncircumferential thickening of the   gallbladder walls. No hyperemia of the adjacent liver. No surrounding   adenopathy. Considerations include acute on chronic cholecystitis or   gallbladder neoplasm. MRI of the abdomen is recommended for further   evaluation.    Colonic diverticulosis without evidence of diverticulitis.    JESSIKA HERNANDEZ M.D.; ATTENDING RADIOLOGIST  This document has been electronically signed. Sep 14 2017  1:16AM

## 2017-09-14 NOTE — H&P ADULT - PSH
H/O prostate biopsy  During prostate biposy, a vessel was hit and wouldn't stop bleeding so patient had emergency prostate surgery. Exact procedure unknown by patient.  Skin lesion of right leg  with biopsy - benign

## 2017-09-14 NOTE — CONSULT NOTE ADULT - SUBJECTIVE AND OBJECTIVE BOX
HPI:  HPI:  83 y/o male PMHx AICD/pacemaker, HTN, HLD, CAD/MI, DM, EF of 25%, acid reflux, BPH with intermittent epigastric discomfort with food intake lasting for 1-2 hours per day over past three days. Slight nausea earlier yesterday. Denies abdominal pain or discomfort now. Last BM was yesterday and normal. Patient last ate at 4pm. No hx colonoscopy/endoscopy. Patient denies fever, chills, nausea, vomiting, constipation, diarrhea, hematuria, melena, hematochezia, chest pain, shortness of breath, dizziness, palpitations. Patient denies prior incident. (14 Sep 2017 03:20)      Chief Complaint:  Patient is a 82y old  Male who presents with a chief complaint of intermittent epigastric discomfort with food intake since Monday (14 Sep 2017 03:20)      Review of Systems:  see above           Social History/Family History  SOCHX:   tobacco,  -  alcohol    FMHX: FA/MO  - contributory       Discussed with:  PMD, Family    Physical Exam:    Vital Signs:  Vital Signs Last 24 Hrs  T(C): 36.5 (14 Sep 2017 16:10), Max: 37.2 (14 Sep 2017 09:09)  T(F): 97.7 (14 Sep 2017 16:10), Max: 99 (14 Sep 2017 09:09)  HR: 68 (14 Sep 2017 16:10) (60 - 72)  BP: 124/74 (14 Sep 2017 16:10) (105/63 - 148/76)  BP(mean): --  RR: 15 (14 Sep 2017 16:10) (14 - 18)  SpO2: 96% (14 Sep 2017 16:10) (96% - 98%)  Daily Height in cm: 187.96 (14 Sep 2017 09:09)    Daily   I&O's Summary      Chest:  Full & symmetric excursion, no increased effort, breath sounds clear  Cardiovascular:  Regular rhythm, S1, S2, no murmur/rub/S3/S4, no carotid/femoral/abdominal bruit, radial/pedal pulses 2+, no edema  Abdomen:  tender        Laboratory:                          15.2   6.1   )-----------( 164      ( 14 Sep 2017 08:08 )             46.4     09-14    140  |  106  |  20  ----------------------------<  122<H>  4.0   |  27  |  1.38<H>    Ca    8.5      14 Sep 2017 08:08    TPro  7.1  /  Alb  3.4  /  TBili  3.2<H>  /  DBili  .90<H>  /  AST  144<H>  /  ALT  289<H>  /  AlkPhos  219<H>  09-14      CARDIAC MARKERS ( 13 Sep 2017 23:25 )  .035 ng/mL / x     / x     / x     / 1.9 ng/mL      CAPILLARY BLOOD GLUCOSE  131 (14 Sep 2017 16:11)  109 (14 Sep 2017 05:19)        LIVER FUNCTIONS - ( 14 Sep 2017 08:08 )  Alb: 3.4 g/dL / Pro: 7.1 gm/dL / ALK PHOS: 219 U/L / ALT: 289 U/L / AST: 144 U/L / GGT: x           PT/INR - ( 14 Sep 2017 04:49 )   PT: 12.5 sec;   INR: 1.14 ratio         PTT - ( 14 Sep 2017 04:49 )  PTT:30.6 sec  Urinalysis Basic - ( 13 Sep 2017 23:27 )    Color: Yellow / Appearance: Clear / S.015 / pH: x  Gluc: x / Ketone: Negative  / Bili: Small / Urobili: 4 mg/dL   Blood: x / Protein: 30 mg/dL / Nitrite: Negative   Leuk Esterase: Trace / RBC: x / WBC 0-2   Sq Epi: x / Non Sq Epi: x / Bacteria: x            Assessment:  Acute Ruthy  H/O CAD  Last cath  at that time medical therapy was recommended  Echo 2015 EF 25-30% is chronic and well managed with medical therapy  Troponin negative  CV risk is acceptable for surgical intervention,   Chronic systolic CHF is noted from chart with EF 25%, AICD already in place

## 2017-09-14 NOTE — H&P ADULT - ASSESSMENT
81 y/o male PMHx AICD/pacemaker, HTN, HLD, CAD/MI, acid reflux, BPH with intermittent epigastric discomfort with food intake lasting for 1-2 hours per day over past three days.   CT: Cholelithiasis with marked irregular noncircumferential thickening of the gallbladder walls. No hyperemia of the adjacent liver. No surrounding adenopathy. Considerations include acute on chronic cholecystitis or gallbladder neoplasm.

## 2017-09-14 NOTE — H&P ADULT - PROBLEM SELECTOR PLAN 1
-Admit patient  -HIDA in AM  -Possible MRI of abdomen  -Possible OR planning pending additional test results for laparoscopic cholecystectomy, possible open   -IV antibiotics: Zosyn  -NPO, IVF  -Pain management prn  -Zofran prn  -Dvt ppx  -f/u labs  -Risks and benefits explained to patient and patient understood.  -Will discuss with attending -Admit patient  -HIDA in AM  -Possible MRI of abdomen  -Possible OR planning pending additional test results for laparoscopic cholecystectomy, possible open   -IV antibiotics: Zosyn  -NPO, IVF  -Pain management prn  -Zofran prn  -Dvt ppx  -f/u labs, order tumor markers  -Risks and benefits explained to patient and patient understood.  -Will discuss with attending

## 2017-09-14 NOTE — CONSULT NOTE ADULT - PROBLEM SELECTOR RECOMMENDATION 5
continue to give ASA  repeat admitting EKG in am  Thank your for the courtesy of this consult, we will follow along with you.

## 2017-09-14 NOTE — H&P ADULT - GASTROINTESTINAL DETAILS
no rebound tenderness/no rigidity/no masses palpable/bowel sounds normal/soft/no guarding/nontender/no distention soft/no rigidity/no masses palpable/no guarding/no rebound tenderness/no distention/bowel sounds normal

## 2017-09-14 NOTE — CONSULT NOTE ADULT - SUBJECTIVE AND OBJECTIVE BOX
CC: I have belly pain   HPI: 83 y/o male with PMHx of HTN, CAD, Chronic Systolic CHF (EF 25%) s/p PPM, NIDDM, BPM s/p prostatectomy, and HLD that is admitted to surgical service for a 3 day history of intermittent epigastric pain that is found to have gallbladder pathology on CT scan.  Denies fevers, chills, shortness of breath, denies chest pain, headache, nausea, vomiting, or other symptom.  Denies recent weight loss or other constitutional symptom. Patient is seen and examined at bedside in ED this morning.  He is currently without somatic complaint.  ED chart reviewed.    Vitals  T(C): 36.6 (17 @ 07:44), Max: 36.8 (17 @ 20:27)  HR: 60 (17 @ 07:44) (60 - 72)  BP: 118/69 (17 @ 07:44) (118/69 - 148/76)  RR: 16 (17 @ 07:44) (14 - 18)  SpO2: 98% (17 @ 07:44) (96% - 98%)    PHYSICAL EXAM:    Constitutional: NAD   Eyes: PERRLA, EOMI  Neck: supple no JVD  Chest:  left sided ppm  Back: flexible, no CVA  Respiratory:  clear to auscultation, no wheezing, or rhonci  Cardiovascular: S1, S2 no murmurs, rubs or gallops  Gastrointestinal: soft, non-tender, + Bowel sounds  Genitourinary: not examined  Rectal:  not examined  Extremities: non-tender, no edema  Neurological: AAO x 3 no focal deficits  Skin: no rashes  Musculoskeletal: 5/5 strength throughout, normal ROM  Psychiatric: appropriate affect    Medications  aluminum hydroxide/magnesium hydroxide/simethicone Suspension 30 milliLiter(s) Oral every 4 hours PRN  heparin  Injectable 5000 Unit(s) SubCutaneous every 12 hours  acetaminophen   Tablet 650 milliGRAM(s) Oral every 6 hours PRN  acetaminophen   Tablet. 650 milliGRAM(s) Oral every 6 hours PRN  ondansetron Injectable 4 milliGRAM(s) IV Push every 6 hours PRN  piperacillin/tazobactam IVPB. 3.375 Gram(s) IV Intermittent every 8 hours  aspirin  chewable 81 milliGRAM(s) Oral daily  tamsulosin 0.4 milliGRAM(s) Oral at bedtime  atorvastatin 10 milliGRAM(s) Oral at bedtime  hydrochlorothiazide 12.5 milliGRAM(s) Oral at bedtime  valsartan 80 milliGRAM(s) Oral daily  carvedilol 12.5 milliGRAM(s) Oral every 12 hours  pantoprazole    Tablet 40 milliGRAM(s) Oral before breakfast  insulin lispro (HumaLOG) corrective regimen sliding scale   SubCutaneous three times a day before meals  insulin lispro (HumaLOG) corrective regimen sliding scale   SubCutaneous at bedtime  dextrose 5%. 1000 milliLiter(s) IV Continuous <Continuous>  dextrose Gel 1 Dose(s) Oral once PRN  dextrose 50% Injectable 12.5 Gram(s) IV Push once  dextrose 50% Injectable 25 Gram(s) IV Push once  dextrose 50% Injectable 25 Gram(s) IV Push once  glucagon  Injectable 1 milliGRAM(s) IntraMuscular once PRN  dextrose 5% + sodium chloride 0.45%. 1000 milliLiter(s) IV Continuous <Continuous>        Labs                        15.2   6.1   )-----------( 164      ( 14 Sep 2017 08:08 )             46.4     -14    140  |  106  |  20  ----------------------------<  122<H>  4.0   |  27  |  1.38<H>    Ca    8.5      14 Sep 2017 08:08  TPro  7.1  /  Alb  3.4  /  TBili  3.2<H>  /  DBili  .90<H>  /  AST  144<H>  /  ALT  289<H>  /  AlkPhos  219<H>  09-14  CARDIAC MARKERS ( 13 Sep 2017 23:25 )  .035 ng/mL / x     / x     / x     / 1.9 ng/mL    Urinalysis Basic - ( 13 Sep 2017 23:27 )  Color: Yellow / Appearance: Clear / S.015 / pH: x  Gluc: x / Ketone: Negative  / Bili: Small / Urobili: 4 mg/dL   Blood: x / Protein: 30 mg/dL / Nitrite: Negative   Leuk Esterase: Trace / RBC: x / WBC 0-2   Sq Epi: x / Non Sq Epi: x / Bacteria: x    PT/INR - ( 14 Sep 2017 04:49 )   PT: 12.5 sec;   INR: 1.14 ratio    PTT - ( 14 Sep 2017 04:49 )  PTT:30.6 sec

## 2017-09-14 NOTE — CHART NOTE - NSCHARTNOTEFT_GEN_A_CORE
Per discussion with Dr. Sheffield:   -will start pt on clear liquid diet  - cardiology consult: Dr Alford called  -despite official HIDA scan report, he feels pt has acute cholecystitis. He will review with our radiologists. Possible OR tomorrow.

## 2017-09-15 LAB
ALBUMIN SERPL ELPH-MCNC: 3.2 G/DL — LOW (ref 3.3–5)
ALP SERPL-CCNC: 180 U/L — HIGH (ref 40–120)
ALT FLD-CCNC: 189 U/L — HIGH (ref 12–78)
ANION GAP SERPL CALC-SCNC: 11 MMOL/L — SIGNIFICANT CHANGE UP (ref 5–17)
AST SERPL-CCNC: 58 U/L — HIGH (ref 15–37)
BILIRUB DIRECT SERPL-MCNC: 0.37 MG/DL — HIGH (ref 0.05–0.2)
BILIRUB INDIRECT FLD-MCNC: 1.1 MG/DL — HIGH (ref 0.2–1)
BILIRUB SERPL-MCNC: 1.5 MG/DL — HIGH (ref 0.2–1.2)
BUN SERPL-MCNC: 14 MG/DL — SIGNIFICANT CHANGE UP (ref 7–23)
CALCIUM SERPL-MCNC: 8.5 MG/DL — SIGNIFICANT CHANGE UP (ref 8.5–10.1)
CHLORIDE SERPL-SCNC: 105 MMOL/L — SIGNIFICANT CHANGE UP (ref 96–108)
CO2 SERPL-SCNC: 25 MMOL/L — SIGNIFICANT CHANGE UP (ref 22–31)
CREAT SERPL-MCNC: 1.22 MG/DL — SIGNIFICANT CHANGE UP (ref 0.5–1.3)
GLUCOSE SERPL-MCNC: 118 MG/DL — HIGH (ref 70–99)
HBA1C BLD-MCNC: 5.6 % — SIGNIFICANT CHANGE UP (ref 4–5.6)
HCT VFR BLD CALC: 42.4 % — SIGNIFICANT CHANGE UP (ref 39–50)
HGB BLD-MCNC: 14.2 G/DL — SIGNIFICANT CHANGE UP (ref 13–17)
MAGNESIUM SERPL-MCNC: 2.4 MG/DL — SIGNIFICANT CHANGE UP (ref 1.6–2.6)
MCHC RBC-ENTMCNC: 31.3 PG — SIGNIFICANT CHANGE UP (ref 27–34)
MCHC RBC-ENTMCNC: 33.6 GM/DL — SIGNIFICANT CHANGE UP (ref 32–36)
MCV RBC AUTO: 93.3 FL — SIGNIFICANT CHANGE UP (ref 80–100)
PHOSPHATE SERPL-MCNC: 2.7 MG/DL — SIGNIFICANT CHANGE UP (ref 2.5–4.5)
PLATELET # BLD AUTO: 181 K/UL — SIGNIFICANT CHANGE UP (ref 150–400)
POTASSIUM SERPL-MCNC: 3.7 MMOL/L — SIGNIFICANT CHANGE UP (ref 3.5–5.3)
POTASSIUM SERPL-SCNC: 3.7 MMOL/L — SIGNIFICANT CHANGE UP (ref 3.5–5.3)
PROT SERPL-MCNC: 7 GM/DL — SIGNIFICANT CHANGE UP (ref 6–8.3)
RBC # BLD: 4.55 M/UL — SIGNIFICANT CHANGE UP (ref 4.2–5.8)
RBC # FLD: 13.2 % — SIGNIFICANT CHANGE UP (ref 11–15)
SODIUM SERPL-SCNC: 141 MMOL/L — SIGNIFICANT CHANGE UP (ref 135–145)
WBC # BLD: 5.7 K/UL — SIGNIFICANT CHANGE UP (ref 3.8–10.5)
WBC # FLD AUTO: 5.7 K/UL — SIGNIFICANT CHANGE UP (ref 3.8–10.5)

## 2017-09-15 PROCEDURE — 99231 SBSQ HOSP IP/OBS SF/LOW 25: CPT

## 2017-09-15 RX ADMIN — SODIUM CHLORIDE 30 MILLILITER(S): 9 INJECTION, SOLUTION INTRAVENOUS at 21:10

## 2017-09-15 RX ADMIN — ATORVASTATIN CALCIUM 10 MILLIGRAM(S): 80 TABLET, FILM COATED ORAL at 21:11

## 2017-09-15 RX ADMIN — PIPERACILLIN AND TAZOBACTAM 25 GRAM(S): 4; .5 INJECTION, POWDER, LYOPHILIZED, FOR SOLUTION INTRAVENOUS at 05:47

## 2017-09-15 RX ADMIN — PIPERACILLIN AND TAZOBACTAM 25 GRAM(S): 4; .5 INJECTION, POWDER, LYOPHILIZED, FOR SOLUTION INTRAVENOUS at 21:10

## 2017-09-15 RX ADMIN — HEPARIN SODIUM 5000 UNIT(S): 5000 INJECTION INTRAVENOUS; SUBCUTANEOUS at 17:11

## 2017-09-15 RX ADMIN — PANTOPRAZOLE SODIUM 40 MILLIGRAM(S): 20 TABLET, DELAYED RELEASE ORAL at 09:11

## 2017-09-15 RX ADMIN — Medication 12.5 MILLIGRAM(S): at 21:10

## 2017-09-15 RX ADMIN — CARVEDILOL PHOSPHATE 12.5 MILLIGRAM(S): 80 CAPSULE, EXTENDED RELEASE ORAL at 17:11

## 2017-09-15 RX ADMIN — PIPERACILLIN AND TAZOBACTAM 25 GRAM(S): 4; .5 INJECTION, POWDER, LYOPHILIZED, FOR SOLUTION INTRAVENOUS at 14:45

## 2017-09-15 RX ADMIN — Medication 81 MILLIGRAM(S): at 12:25

## 2017-09-15 RX ADMIN — HEPARIN SODIUM 5000 UNIT(S): 5000 INJECTION INTRAVENOUS; SUBCUTANEOUS at 05:48

## 2017-09-15 RX ADMIN — TAMSULOSIN HYDROCHLORIDE 0.4 MILLIGRAM(S): 0.4 CAPSULE ORAL at 21:10

## 2017-09-15 NOTE — DIETITIAN INITIAL EVALUATION ADULT. - OTHER INFO
Pt seen due to RN consult for N/V>/=3days.  Pt is tolerating full liquid diet, c some c/o abdominal pain at times.  GI consult c questionable GB mass noted,

## 2017-09-15 NOTE — PROGRESS NOTE ADULT - SUBJECTIVE AND OBJECTIVE BOX
Assessment:  Acute Ruthy  H/O CAD  Last cath 2015 at that time medical therapy was recommended  Echo 2015 EF 25-30% is chronic and well managed with medical therapy  Troponin negative  CV risk is acceptable for surgical intervention,   Chronic systolic CHF is noted from chart with EF 25%, AICD already in place

## 2017-09-15 NOTE — DIETITIAN INITIAL EVALUATION ADULT. - PERTINENT MEDS FT
ABX , lispro corrective regimen sliding scale 3 x day before meals & bedtime , pantoprazole , hydrochlorothiazide , valsartan , carvedilol , atorvastatin , ondansetron prn for N/V q 6 hours , aluminum hydroxide/magnesium hydroxide/simethicone suspension prn for indigestion

## 2017-09-15 NOTE — PROGRESS NOTE ADULT - SUBJECTIVE AND OBJECTIVE BOX
INTERVAL HPI/OVERNIGHT EVENTS:    Patient lying comfortably.  No complaint of abdominal pain.  Tolerating full liquid diet with no complaint of nausea/vomiting.  +Flatus/BM.  Appreciate Cardiac/GI input.      Vital Signs Last 24 Hrs  T(C): 37 (15 Sep 2017 11:08), Max: 37 (15 Sep 2017 11:08)  T(F): 98.6 (15 Sep 2017 11:08), Max: 98.6 (15 Sep 2017 11:08)  HR: 68 (15 Sep 2017 11:08) (61 - 73)  BP: 143/72 (15 Sep 2017 11:08) (100/73 - 143/72)  BP(mean): --  RR: 16 (15 Sep 2017 11:08) (15 - 18)  SpO2: 98% (15 Sep 2017 11:08) (95% - 98%)    MEDICATIONS  (STANDING):  heparin  Injectable 5000 Unit(s) SubCutaneous every 12 hours  piperacillin/tazobactam IVPB. 3.375 Gram(s) IV Intermittent every 8 hours  aspirin  chewable 81 milliGRAM(s) Oral daily  tamsulosin 0.4 milliGRAM(s) Oral at bedtime  atorvastatin 10 milliGRAM(s) Oral at bedtime  hydrochlorothiazide 12.5 milliGRAM(s) Oral at bedtime  valsartan 80 milliGRAM(s) Oral daily  carvedilol 12.5 milliGRAM(s) Oral every 12 hours  pantoprazole    Tablet 40 milliGRAM(s) Oral before breakfast  insulin lispro (HumaLOG) corrective regimen sliding scale   SubCutaneous three times a day before meals  insulin lispro (HumaLOG) corrective regimen sliding scale   SubCutaneous at bedtime  dextrose 5%. 1000 milliLiter(s) (50 mL/Hr) IV Continuous <Continuous>  dextrose 50% Injectable 12.5 Gram(s) IV Push once  dextrose 50% Injectable 25 Gram(s) IV Push once  dextrose 50% Injectable 25 Gram(s) IV Push once  dextrose 5% + sodium chloride 0.45%. 1000 milliLiter(s) (30 mL/Hr) IV Continuous <Continuous>    MEDICATIONS  (PRN):  aluminum hydroxide/magnesium hydroxide/simethicone Suspension 30 milliLiter(s) Oral every 4 hours PRN Dyspepsia  acetaminophen   Tablet 650 milliGRAM(s) Oral every 6 hours PRN For Temp greater than 38 C (100.4 F)  acetaminophen   Tablet. 650 milliGRAM(s) Oral every 6 hours PRN Mild Pain (1 - 3)  ondansetron Injectable 4 milliGRAM(s) IV Push every 6 hours PRN Nausea  dextrose Gel 1 Dose(s) Oral once PRN Blood Glucose LESS THAN 70 milliGRAM(s)/deciliter  glucagon  Injectable 1 milliGRAM(s) IntraMuscular once PRN Glucose LESS THAN 70 milligrams/deciliter      PHYSICAL EXAM:    GENERAL: NAD  HEAD:  Atraumatic, Normocephalic  EYES: EOMI, PERRLA, conjunctiva and sclera clear  CHEST/LUNG: Clear to ausculation, bilaterally   HEART: S1S2  ABDOMEN: non distended, +BS, soft, non tender, no guarding  EXTREMITIES:  calf soft, non tender     I&O's Detail    14 Sep 2017 07:01  -  15 Sep 2017 07:00  --------------------------------------------------------  IN:    dextrose 5% + sodium chloride 0.45%.: 660 mL    Oral Fluid: 440 mL    Solution: 300 mL  Total IN: 1400 mL    OUT:    Voided: 1000 mL  Total OUT: 1000 mL    Total NET: 400 mL      15 Sep 2017 07:01  -  15 Sep 2017 14:23  --------------------------------------------------------  IN:  Total IN: 0 mL    OUT:    Voided: 1100 mL  Total OUT: 1100 mL    Total NET: -1100 mL          LABS:                        14.2   5.7   )-----------( 181      ( 15 Sep 2017 08:10 )             42.4     -15    141  |  105  |  14  ----------------------------<  118<H>  3.7   |  25  |  1.22    Ca    8.5      15 Sep 2017 08:10  Phos  2.7     09-15  Mg     2.4     -15    TPro  7.0  /  Alb  3.2<L>  /  TBili  1.5<H>  /  DBili  .37<H>  /  AST  58<H>  /  ALT  189<H>  /  AlkPhos  180<H>  -15    PT/INR - ( 14 Sep 2017 04:49 )   PT: 12.5 sec;   INR: 1.14 ratio         PTT - ( 14 Sep 2017 04:49 )  PTT:30.6 sec  Urinalysis Basic - ( 13 Sep 2017 23:27 )    Color: Yellow / Appearance: Clear / S.015 / pH: x  Gluc: x / Ketone: Negative  / Bili: Small / Urobili: 4 mg/dL   Blood: x / Protein: 30 mg/dL / Nitrite: Negative   Leuk Esterase: Trace / RBC: x / WBC 0-2   Sq Epi: x / Non Sq Epi: x / Bacteria: x      Impression:    81 y/o male with PMHx of HTN, CAD, Chronic Systolic CHF (EF 25%) s/p PPM, NIDDM, BPM s/p prostatectomy, and HLD with GB Mass /elevated LFT/      Plan:  Discuss case with Dr. Sheffield Re:  Timing of surgical intervention.    He will discuss case with patient and family -- for surgery planning.   will give Low Fat diet for now NPO p MN poss OR tomorrow   continue medical management/supportive care   Prophylactic measure   GI/Cardio follow up INTERVAL HPI/OVERNIGHT EVENTS:    Patient lying comfortably.  No complaint of abdominal pain.  Tolerating full liquid diet with no complaint of nausea/vomiting.  +Flatus/BM.  Appreciate Cardiac/GI input.      Vital Signs Last 24 Hrs  T(C): 37 (15 Sep 2017 11:08), Max: 37 (15 Sep 2017 11:08)  T(F): 98.6 (15 Sep 2017 11:08), Max: 98.6 (15 Sep 2017 11:08)  HR: 68 (15 Sep 2017 11:08) (61 - 73)  BP: 143/72 (15 Sep 2017 11:08) (100/73 - 143/72)  BP(mean): --  RR: 16 (15 Sep 2017 11:08) (15 - 18)  SpO2: 98% (15 Sep 2017 11:08) (95% - 98%)    MEDICATIONS  (STANDING):  heparin  Injectable 5000 Unit(s) SubCutaneous every 12 hours  piperacillin/tazobactam IVPB. 3.375 Gram(s) IV Intermittent every 8 hours  aspirin  chewable 81 milliGRAM(s) Oral daily  tamsulosin 0.4 milliGRAM(s) Oral at bedtime  atorvastatin 10 milliGRAM(s) Oral at bedtime  hydrochlorothiazide 12.5 milliGRAM(s) Oral at bedtime  valsartan 80 milliGRAM(s) Oral daily  carvedilol 12.5 milliGRAM(s) Oral every 12 hours  pantoprazole    Tablet 40 milliGRAM(s) Oral before breakfast  insulin lispro (HumaLOG) corrective regimen sliding scale   SubCutaneous three times a day before meals  insulin lispro (HumaLOG) corrective regimen sliding scale   SubCutaneous at bedtime  dextrose 5%. 1000 milliLiter(s) (50 mL/Hr) IV Continuous <Continuous>  dextrose 50% Injectable 12.5 Gram(s) IV Push once  dextrose 50% Injectable 25 Gram(s) IV Push once  dextrose 50% Injectable 25 Gram(s) IV Push once  dextrose 5% + sodium chloride 0.45%. 1000 milliLiter(s) (30 mL/Hr) IV Continuous <Continuous>    MEDICATIONS  (PRN):  aluminum hydroxide/magnesium hydroxide/simethicone Suspension 30 milliLiter(s) Oral every 4 hours PRN Dyspepsia  acetaminophen   Tablet 650 milliGRAM(s) Oral every 6 hours PRN For Temp greater than 38 C (100.4 F)  acetaminophen   Tablet. 650 milliGRAM(s) Oral every 6 hours PRN Mild Pain (1 - 3)  ondansetron Injectable 4 milliGRAM(s) IV Push every 6 hours PRN Nausea  dextrose Gel 1 Dose(s) Oral once PRN Blood Glucose LESS THAN 70 milliGRAM(s)/deciliter  glucagon  Injectable 1 milliGRAM(s) IntraMuscular once PRN Glucose LESS THAN 70 milligrams/deciliter      PHYSICAL EXAM:    GENERAL: NAD  HEAD:  Atraumatic, Normocephalic  EYES: EOMI, PERRLA, conjunctiva and sclera clear  CHEST/LUNG: Clear to ausculation, bilaterally   HEART: S1S2  ABDOMEN: non distended, +BS, soft, non tender, no guarding  EXTREMITIES:  calf soft, non tender     I&O's Detail    14 Sep 2017 07:01  -  15 Sep 2017 07:00  --------------------------------------------------------  IN:    dextrose 5% + sodium chloride 0.45%.: 660 mL    Oral Fluid: 440 mL    Solution: 300 mL  Total IN: 1400 mL    OUT:    Voided: 1000 mL  Total OUT: 1000 mL    Total NET: 400 mL      15 Sep 2017 07:01  -  15 Sep 2017 14:23  --------------------------------------------------------  IN:  Total IN: 0 mL    OUT:    Voided: 1100 mL  Total OUT: 1100 mL    Total NET: -1100 mL          LABS:                        14.2   5.7   )-----------( 181      ( 15 Sep 2017 08:10 )             42.4     -15    141  |  105  |  14  ----------------------------<  118<H>  3.7   |  25  |  1.22    Ca    8.5      15 Sep 2017 08:10  Phos  2.7     09-15  Mg     2.4     -15    TPro  7.0  /  Alb  3.2<L>  /  TBili  1.5<H>  /  DBili  .37<H>  /  AST  58<H>  /  ALT  189<H>  /  AlkPhos  180<H>  09-15    PT/INR - ( 14 Sep 2017 04:49 )   PT: 12.5 sec;   INR: 1.14 ratio         PTT - ( 14 Sep 2017 04:49 )  PTT:30.6 sec  Urinalysis Basic - ( 13 Sep 2017 23:27 )    Color: Yellow / Appearance: Clear / S.015 / pH: x  Gluc: x / Ketone: Negative  / Bili: Small / Urobili: 4 mg/dL   Blood: x / Protein: 30 mg/dL / Nitrite: Negative   Leuk Esterase: Trace / RBC: x / WBC 0-2   Sq Epi: x / Non Sq Epi: x / Bacteria: x      Impression:    83 y/o male with PMHx of HTN, CAD, Chronic Systolic CHF (EF 25%) s/p PPM, NIDDM, BPM s/p prostatectomy, and HLD with GB Mass /elevated LFT/.  R/o Gallbladder Carcinoma.      Plan:  Discuss case with Dr. Sheffield Re:  Timing of surgical intervention.    He will discuss case with patient and family -- for surgery planning.   will give Low Fat diet for now NPO p MN poss OR tomorrow   continue medical management/supportive care   Prophylactic measure   GI/Cardio follow up

## 2017-09-15 NOTE — CONSULT NOTE ADULT - SUBJECTIVE AND OBJECTIVE BOX
CC: I have belly pain   HPI: 83 y/o male with PMHx of HTN, CAD, Chronic Systolic CHF (EF 25%) s/p PPM, NIDDM, BPM s/p prostatectomy, and HLD that is admitted to surgical service for a 3 day history of intermittent epigastric pain that is found to have gallbladder pathology on CT scan.  Denies fevers, chills, shortness of breath, denies chest pain, headache, nausea, vomiting, or other symptom.  Denies recent weight loss or other constitutional symptom. Patient is seen and examined at bedside in ED this morning.  He is currently without somatic complaint.  ED chart reviewed.      MEDICATIONS  (STANDING):  heparin  Injectable 5000 Unit(s) SubCutaneous every 12 hours  piperacillin/tazobactam IVPB. 3.375 Gram(s) IV Intermittent every 8 hours  aspirin  chewable 81 milliGRAM(s) Oral daily  tamsulosin 0.4 milliGRAM(s) Oral at bedtime  atorvastatin 10 milliGRAM(s) Oral at bedtime  hydrochlorothiazide 12.5 milliGRAM(s) Oral at bedtime  valsartan 80 milliGRAM(s) Oral daily  carvedilol 12.5 milliGRAM(s) Oral every 12 hours  pantoprazole    Tablet 40 milliGRAM(s) Oral before breakfast  insulin lispro (HumaLOG) corrective regimen sliding scale   SubCutaneous three times a day before meals  insulin lispro (HumaLOG) corrective regimen sliding scale   SubCutaneous at bedtime  dextrose 5%. 1000 milliLiter(s) (50 mL/Hr) IV Continuous <Continuous>  dextrose 50% Injectable 12.5 Gram(s) IV Push once  dextrose 50% Injectable 25 Gram(s) IV Push once  dextrose 50% Injectable 25 Gram(s) IV Push once  dextrose 5% + sodium chloride 0.45%. 1000 milliLiter(s) (30 mL/Hr) IV Continuous <Continuous>    MEDICATIONS  (PRN):  aluminum hydroxide/magnesium hydroxide/simethicone Suspension 30 milliLiter(s) Oral every 4 hours PRN Dyspepsia  acetaminophen   Tablet 650 milliGRAM(s) Oral every 6 hours PRN For Temp greater than 38 C (100.4 F)  acetaminophen   Tablet. 650 milliGRAM(s) Oral every 6 hours PRN Mild Pain (1 - 3)  ondansetron Injectable 4 milliGRAM(s) IV Push every 6 hours PRN Nausea  dextrose Gel 1 Dose(s) Oral once PRN Blood Glucose LESS THAN 70 milliGRAM(s)/deciliter  glucagon  Injectable 1 milliGRAM(s) IntraMuscular once PRN Glucose LESS THAN 70 milligrams/deciliter    Vital Signs Last 24 Hrs  T(C): 37 (15 Sep 2017 11:08), Max: 37 (15 Sep 2017 11:08)  T(F): 98.6 (15 Sep 2017 11:08), Max: 98.6 (15 Sep 2017 11:08)  HR: 68 (15 Sep 2017 11:08) (61 - 73)  BP: 143/72 (15 Sep 2017 11:08) (100/73 - 143/72)  BP(mean): --  RR: 16 (15 Sep 2017 11:08) (15 - 18)  SpO2: 98% (15 Sep 2017 11:08) (95% - 98%)    PHYSICAL EXAM:    Labs                        15.2   6.1   )-----------( 164      ( 14 Sep 2017 08:08 )             46.4     -14    140  |  106  |  20  ----------------------------<  122<H>  4.0   |  27  |  1.38<H>    Ca    8.5      14 Sep 2017 08:08  TPro  7.1  /  Alb  3.4  /  TBili  3.2<H>  /  DBili  .90<H>  /  AST  144<H>  /  ALT  289<H>  /  AlkPhos  219<H>  09-14  CARDIAC MARKERS ( 13 Sep 2017 23:25 )  .035 ng/mL / x     / x     / x     / 1.9 ng/mL    Urinalysis Basic - ( 13 Sep 2017 23:27 )  Color: Yellow / Appearance: Clear / S.015 / pH: x  Gluc: x / Ketone: Negative  / Bili: Small / Urobili: 4 mg/dL   Blood: x / Protein: 30 mg/dL / Nitrite: Negative   Leuk Esterase: Trace / RBC: x / WBC 0-2   Sq Epi: x / Non Sq Epi: x / Bacteria: x    PT/INR - ( 14 Sep 2017 04:49 )   PT: 12.5 sec;   INR: 1.14 ratio    PTT - ( 14 Sep 2017 04:49 )  PTT:30.6 sec

## 2017-09-15 NOTE — DIETITIAN INITIAL EVALUATION ADULT. - ADHERENCE
fair/Low sodium , limited sweets , sugar, reports hx of diet controlled diabetes, wife did the shopping & cooking PTA

## 2017-09-15 NOTE — DIETITIAN INITIAL EVALUATION ADULT. - NS AS NUTRI INTERV MEALS SNACK
Recommend full liquid consistent carbohydrate & advance diet when appropriate to low fat, low sodium , consistent carbohydrate c evening snack/[place]eral - modified diet/Carbohydrate - modified diet/Fat - modified diet

## 2017-09-15 NOTE — DIETITIAN INITIAL EVALUATION ADULT. - ENERGY NEEDS
Height (cm): 182.88 (09-15)  Weight (kg): 88 (09-14)  BMI (kg/m2): 26.3 (09-15)  IBW: 80.7 kg  % IBW: 109%  UBW: 87.9 kg    %UBW: 100%

## 2017-09-15 NOTE — DIETITIAN INITIAL EVALUATION ADULT. - PERTINENT LABORATORY DATA
09-15 Na141 mmol/L Glu 118 mg/dL<H> K+ 3.7 mmol/L Cr  1.22 mg/dL BUN 14 mg/dL  Est GFR 55 mL/min/1.73M2<L> Phos 2.7 mg/dL Ca 8.5 mg/dL Alb 3.2 g/dL<L> LFTs <H>  Hgb 14.2 g/dL Hct 42.4 % 09-14 cancer antigen 68.5< H>

## 2017-09-15 NOTE — PROGRESS NOTE ADULT - SUBJECTIVE AND OBJECTIVE BOX
Gastroenterolgy  Patient seen and examined bedside resting comfortably.  No complaints offered. Reports no Abdominal pain.  Denies nausea and vomiting. Tolerating diet.  No flatus/BM.     T(F): 98.6 (09-15-17 @ 11:08), Max: 98.6 (09-15-17 @ 11:08)  HR: 68 (09-15-17 @ 11:08) (61 - 73)  BP: 143/72 (09-15-17 @ 11:08) (100/73 - 143/72)  RR: 16 (09-15-17 @ 11:08) (15 - 18)  SpO2: 98% (09-15-17 @ 11:08) (95% - 98%)  Wt(kg): --  CAPILLARY BLOOD GLUCOSE  129 (15 Sep 2017 12:23)  120 (15 Sep 2017 09:10)  103 (14 Sep 2017 22:50)  131 (14 Sep 2017 16:11)          PHYSICAL EXAM:  General: NAD, WDWN.   Neuro:  Alert & oriented x 3  HEENT: NCAT, EOMI, conjunctiva clear  CV: +S1+S2 regular rate and rhythm  Lung: clear to ausculation bilaterally, respirations nonlabored, good inspiratory effort  Abdomen: soft, Non tender, No Distension. Normal active BS  Extremities: no pedal edema or calf tenderness noted     LABS:                        14.2   5.7   )-----------( 181      ( 15 Sep 2017 08:10 )             42.4     09-15    141  |  105  |  14  ----------------------------<  118<H>  3.7   |  25  |  1.22    Ca    8.5      15 Sep 2017 08:10  Phos  2.7     09-15  Mg     2.4     09-15    TPro  7.0  /  Alb  3.2<L>  /  TBili  1.5<H>  /  DBili  .37<H>  /  AST  58<H>  /  ALT  189<H>  /  AlkPhos  180<H>  09-15    LIVER FUNCTIONS - ( 15 Sep 2017 08:10 )  Alb: 3.2 g/dL / Pro: 7.0 gm/dL / ALK PHOS: 180 U/L / ALT: 189 U/L / AST: 58 U/L / GGT: x           PT/INR - ( 14 Sep 2017 04:49 )   PT: 12.5 sec;   INR: 1.14 ratio         PTT - ( 14 Sep 2017 04:49 )  PTT:30.6 sec  I&O's Detail    14 Sep 2017 07:01  -  15 Sep 2017 07:00  --------------------------------------------------------  IN:    dextrose 5% + sodium chloride 0.45%.: 660 mL    Oral Fluid: 440 mL    Solution: 300 mL  Total IN: 1400 mL    OUT:    Voided: 1000 mL  Total OUT: 1000 mL    Total NET: 400 mL      15 Sep 2017 07:01  -  15 Sep 2017 13:22  --------------------------------------------------------  IN:  Total IN: 0 mL    OUT:    Voided: 1100 mL  Total OUT: 1100 mL    Total NET: -1100 mL        09-15 @ 08:10    141 | 105 | 14  /8.5 | 2.4 | 2.7  _______________________/  3.7 | 25 | 1.22                           \par         EXAM:  NM HEPATOBILIARY IMG                        PROCEDURE DATE:  09/14/2017       INTERPRETATION:  CLINICAL INFORMATION: 82 year-old male with generalized   abdominal pain, cholelithiasis and irregular thickening of the   gallbladder wallon CT, referred to evaluate for acute cholecystitis.    RADIOPHARMACEUTICAL: 3.0 mCi Tc-99m-Mebrofenin, I.V.; 2 doses    TECHNIQUE: Dynamic imaging of the anterior abdomen was performed for 1   hour following injection of radiotracer. Morphine 3.0 mgI.V. and a   second dose of radiotracer were administered at 1 hour.  Dynamic imaging   was continued for 1 hour followed by static images of the abdomen in the   anterior, right lateral and right anterior oblique projections.    COMPARISON: No previous hepatobiliary scan for comparison.    FINDINGS: There is prompt, homogeneous uptake of radiotracer by the   hepatocytes. Activity is first seen in the bowel at 20 minutes. The   gallbladder is not visualized in the first hour of imaging, but was seen   50 minutes after the administration of morphine. There is good clearance   of activity from the liver at the end of the study.    IMPRESSION: Normal morphine-augmented hepatobiliary scan.    No evidence of acute cholecystitis.    ALPESH BLACK M.D., NUCLEAR MEDICINE ATTENDING  This document has been electronically signed. Sep 14 2017 12:34PM

## 2017-09-15 NOTE — DIETITIAN INITIAL EVALUATION ADULT. - PROBLEM SELECTOR PLAN 1
-Admit patient  -HIDA in AM  -Possible MRI of abdomen  -Possible OR planning pending additional test results for laparoscopic cholecystectomy, possible open   -IV antibiotics: Zosyn  -NPO, IVF  -Pain management prn  -Zofran prn  -Dvt ppx  -f/u labs, order tumor markers  -Risks and benefits explained to patient and patient understood.  -Will discuss with attending

## 2017-09-15 NOTE — DIETITIAN INITIAL EVALUATION ADULT. - NS AS NUTRI INTERV MEDICAL AND FOOD SUPPLEMENTS
Commercial beverage/Recommend  Glucerna Shake 2 x daily= 200 calories, 20 grams protein daily & 14 grams fat daily while on full liquid diet

## 2017-09-16 LAB
ANION GAP SERPL CALC-SCNC: 11 MMOL/L — SIGNIFICANT CHANGE UP (ref 5–17)
BUN SERPL-MCNC: 13 MG/DL — SIGNIFICANT CHANGE UP (ref 7–23)
CALCIUM SERPL-MCNC: 8.5 MG/DL — SIGNIFICANT CHANGE UP (ref 8.5–10.1)
CHLORIDE SERPL-SCNC: 107 MMOL/L — SIGNIFICANT CHANGE UP (ref 96–108)
CO2 SERPL-SCNC: 24 MMOL/L — SIGNIFICANT CHANGE UP (ref 22–31)
CREAT SERPL-MCNC: 1.31 MG/DL — HIGH (ref 0.5–1.3)
GLUCOSE SERPL-MCNC: 109 MG/DL — HIGH (ref 70–99)
HCT VFR BLD CALC: 45.7 % — SIGNIFICANT CHANGE UP (ref 39–50)
HGB BLD-MCNC: 15.2 G/DL — SIGNIFICANT CHANGE UP (ref 13–17)
MAGNESIUM SERPL-MCNC: 2.3 MG/DL — SIGNIFICANT CHANGE UP (ref 1.6–2.6)
MCHC RBC-ENTMCNC: 31.1 PG — SIGNIFICANT CHANGE UP (ref 27–34)
MCHC RBC-ENTMCNC: 33.2 GM/DL — SIGNIFICANT CHANGE UP (ref 32–36)
MCV RBC AUTO: 93.7 FL — SIGNIFICANT CHANGE UP (ref 80–100)
PHOSPHATE SERPL-MCNC: 3.3 MG/DL — SIGNIFICANT CHANGE UP (ref 2.5–4.5)
PLATELET # BLD AUTO: 167 K/UL — SIGNIFICANT CHANGE UP (ref 150–400)
POTASSIUM SERPL-MCNC: 3.8 MMOL/L — SIGNIFICANT CHANGE UP (ref 3.5–5.3)
POTASSIUM SERPL-SCNC: 3.8 MMOL/L — SIGNIFICANT CHANGE UP (ref 3.5–5.3)
RBC # BLD: 4.88 M/UL — SIGNIFICANT CHANGE UP (ref 4.2–5.8)
RBC # FLD: 13.3 % — SIGNIFICANT CHANGE UP (ref 11–15)
SODIUM SERPL-SCNC: 142 MMOL/L — SIGNIFICANT CHANGE UP (ref 135–145)
WBC # BLD: 6.6 K/UL — SIGNIFICANT CHANGE UP (ref 3.8–10.5)
WBC # FLD AUTO: 6.6 K/UL — SIGNIFICANT CHANGE UP (ref 3.8–10.5)

## 2017-09-16 RX ADMIN — TAMSULOSIN HYDROCHLORIDE 0.4 MILLIGRAM(S): 0.4 CAPSULE ORAL at 21:58

## 2017-09-16 RX ADMIN — PIPERACILLIN AND TAZOBACTAM 25 GRAM(S): 4; .5 INJECTION, POWDER, LYOPHILIZED, FOR SOLUTION INTRAVENOUS at 13:37

## 2017-09-16 RX ADMIN — Medication 81 MILLIGRAM(S): at 12:16

## 2017-09-16 RX ADMIN — PIPERACILLIN AND TAZOBACTAM 25 GRAM(S): 4; .5 INJECTION, POWDER, LYOPHILIZED, FOR SOLUTION INTRAVENOUS at 21:58

## 2017-09-16 RX ADMIN — PANTOPRAZOLE SODIUM 40 MILLIGRAM(S): 20 TABLET, DELAYED RELEASE ORAL at 05:25

## 2017-09-16 RX ADMIN — HEPARIN SODIUM 5000 UNIT(S): 5000 INJECTION INTRAVENOUS; SUBCUTANEOUS at 17:04

## 2017-09-16 RX ADMIN — Medication 12.5 MILLIGRAM(S): at 21:58

## 2017-09-16 RX ADMIN — ATORVASTATIN CALCIUM 10 MILLIGRAM(S): 80 TABLET, FILM COATED ORAL at 21:58

## 2017-09-16 RX ADMIN — HEPARIN SODIUM 5000 UNIT(S): 5000 INJECTION INTRAVENOUS; SUBCUTANEOUS at 05:25

## 2017-09-16 RX ADMIN — CARVEDILOL PHOSPHATE 12.5 MILLIGRAM(S): 80 CAPSULE, EXTENDED RELEASE ORAL at 17:04

## 2017-09-16 RX ADMIN — PIPERACILLIN AND TAZOBACTAM 25 GRAM(S): 4; .5 INJECTION, POWDER, LYOPHILIZED, FOR SOLUTION INTRAVENOUS at 05:24

## 2017-09-16 NOTE — PROGRESS NOTE ADULT - SUBJECTIVE AND OBJECTIVE BOX
Pt feeling better  no c/o abd pain - scheduled for cholecystitis on Monday    MEDICATIONS  (STANDING):  heparin  Injectable 5000 Unit(s) SubCutaneous every 12 hours  piperacillin/tazobactam IVPB. 3.375 Gram(s) IV Intermittent every 8 hours  aspirin  chewable 81 milliGRAM(s) Oral daily  tamsulosin 0.4 milliGRAM(s) Oral at bedtime  atorvastatin 10 milliGRAM(s) Oral at bedtime  hydrochlorothiazide 12.5 milliGRAM(s) Oral at bedtime  valsartan 80 milliGRAM(s) Oral daily  carvedilol 12.5 milliGRAM(s) Oral every 12 hours  pantoprazole    Tablet 40 milliGRAM(s) Oral before breakfast  insulin lispro (HumaLOG) corrective regimen sliding scale   SubCutaneous three times a day before meals  insulin lispro (HumaLOG) corrective regimen sliding scale   SubCutaneous at bedtime  dextrose 5%. 1000 milliLiter(s) (50 mL/Hr) IV Continuous <Continuous>  dextrose 50% Injectable 12.5 Gram(s) IV Push once  dextrose 50% Injectable 25 Gram(s) IV Push once  dextrose 50% Injectable 25 Gram(s) IV Push once  dextrose 5% + sodium chloride 0.45%. 1000 milliLiter(s) (30 mL/Hr) IV Continuous <Continuous>    MEDICATIONS  (PRN):  aluminum hydroxide/magnesium hydroxide/simethicone Suspension 30 milliLiter(s) Oral every 4 hours PRN Dyspepsia  acetaminophen   Tablet 650 milliGRAM(s) Oral every 6 hours PRN For Temp greater than 38 C (100.4 F)  acetaminophen   Tablet. 650 milliGRAM(s) Oral every 6 hours PRN Mild Pain (1 - 3)  ondansetron Injectable 4 milliGRAM(s) IV Push every 6 hours PRN Nausea  dextrose Gel 1 Dose(s) Oral once PRN Blood Glucose LESS THAN 70 milliGRAM(s)/deciliter  glucagon  Injectable 1 milliGRAM(s) IntraMuscular once PRN Glucose LESS THAN 70 milligrams/deciliter      Allergies    No Known Allergies    Intolerances        Vital Signs Last 24 Hrs  T(C): 36.4 (16 Sep 2017 16:55), Max: 36.6 (16 Sep 2017 04:51)  T(F): 97.6 (16 Sep 2017 16:55), Max: 97.8 (16 Sep 2017 04:51)  HR: 72 (16 Sep 2017 16:55) (55 - 91)  BP: 146/69 (16 Sep 2017 16:55) (113/66 - 146/69)  BP(mean): --  RR: 18 (16 Sep 2017 16:55) (16 - 18)  SpO2: 95% (16 Sep 2017 16:55) (95% - 98%)    PHYSICAL EXAM:  General: NAD.  CVS: S1, S2  Chest: air entry bilaterally present  Abd: BS present, soft, non-tender      LABS:                        15.2   6.6   )-----------( 167      ( 16 Sep 2017 03:59 )             45.7     09-16    142  |  107  |  13  ----------------------------<  109<H>  3.8   |  24  |  1.31<H>    Ca    8.5      16 Sep 2017 03:59  Phos  3.3     09-16  Mg     2.3     09-16    TPro  7.0  /  Alb  3.2<L>  /  TBili  1.5<H>  /  DBili  .37<H>  /  AST  58<H>  /  ALT  189<H>  /  AlkPhos  180<H>  09-15    cont zosyn  as per surgery

## 2017-09-16 NOTE — PROGRESS NOTE ADULT - ASSESSMENT
Abdominal pain improved.  Cholelithiasis, Cholecystitis.  Possible Gallbladder Ca.    Plan:    Lap-Cholecystectomy, possible ope on Monday.  Continue current management.

## 2017-09-16 NOTE — PROGRESS NOTE ADULT - SUBJECTIVE AND OBJECTIVE BOX
Dr. Sheffield's progress note:    STATUS POST:    Patient Comfortable , denies pain, nausea or vomiting.      SUBJECTIVE:  Flatus: [x ] YES [ ] NO             Bowel Movement: [x ] YES [ ] NO  Pain (0-10):            Pain Control Adequate: [ ] YES [ ] NO  Nausea: [ ] YES [ x] NO            Vomiting: [ ] YES [x ] NO  Diarrhea: [ ] YES [x ] NO         Constipation: [ ] YES [x ] NO     Chest Pain: [ ] YES [x ] NO    SOB:  [ ] YES [ x] NO    MEDICATIONS  (STANDING):  heparin  Injectable 5000 Unit(s) SubCutaneous every 12 hours  piperacillin/tazobactam IVPB. 3.375 Gram(s) IV Intermittent every 8 hours  aspirin  chewable 81 milliGRAM(s) Oral daily  tamsulosin 0.4 milliGRAM(s) Oral at bedtime  atorvastatin 10 milliGRAM(s) Oral at bedtime  hydrochlorothiazide 12.5 milliGRAM(s) Oral at bedtime  valsartan 80 milliGRAM(s) Oral daily  carvedilol 12.5 milliGRAM(s) Oral every 12 hours  pantoprazole    Tablet 40 milliGRAM(s) Oral before breakfast  insulin lispro (HumaLOG) corrective regimen sliding scale   SubCutaneous three times a day before meals  insulin lispro (HumaLOG) corrective regimen sliding scale   SubCutaneous at bedtime  dextrose 5%. 1000 milliLiter(s) (50 mL/Hr) IV Continuous <Continuous>  dextrose 50% Injectable 12.5 Gram(s) IV Push once  dextrose 50% Injectable 25 Gram(s) IV Push once  dextrose 50% Injectable 25 Gram(s) IV Push once  dextrose 5% + sodium chloride 0.45%. 1000 milliLiter(s) (30 mL/Hr) IV Continuous <Continuous>    MEDICATIONS  (PRN):  aluminum hydroxide/magnesium hydroxide/simethicone Suspension 30 milliLiter(s) Oral every 4 hours PRN Dyspepsia  acetaminophen   Tablet 650 milliGRAM(s) Oral every 6 hours PRN For Temp greater than 38 C (100.4 F)  acetaminophen   Tablet. 650 milliGRAM(s) Oral every 6 hours PRN Mild Pain (1 - 3)  ondansetron Injectable 4 milliGRAM(s) IV Push every 6 hours PRN Nausea  dextrose Gel 1 Dose(s) Oral once PRN Blood Glucose LESS THAN 70 milliGRAM(s)/deciliter  glucagon  Injectable 1 milliGRAM(s) IntraMuscular once PRN Glucose LESS THAN 70 milligrams/deciliter    Vital Signs Last 24 Hrs  T(C): 36.6 (16 Sep 2017 04:51), Max: 36.6 (16 Sep 2017 04:51)  T(F): 97.8 (16 Sep 2017 04:51), Max: 97.8 (16 Sep 2017 04:51)  HR: 91 (16 Sep 2017 10:58) (55 - 91)  BP: 122/88 (16 Sep 2017 10:58) (113/66 - 149/74)  BP(mean): --  RR: 16 (16 Sep 2017 10:58) (15 - 18)  SpO2: 97% (16 Sep 2017 10:58) (97% - 98%)  I&O's Summary    15 Sep 2017 07:01  -  16 Sep 2017 07:00  --------------------------------------------------------  IN: 200 mL / OUT: 1900 mL / NET: -1700 mL    16 Sep 2017 07:01  -  16 Sep 2017 15:11  --------------------------------------------------------  IN: 520 mL / OUT: 0 mL / NET: 520 mL      PHYSICAL EXAM:  Constitutional: Patient well nourish. well developed.  Eyes:  PERRL, EOMI, Conjunctiva clear.  ENMT:  WNL  Neck:  Supple.  Respiratory:  Lungs CTA, B/L, no rales , no wheezing, no rhonchi.  Cardiovascular:  S1, S2, RRR  Gastrointestinal: Abdomen soft, non distended, + BS, non tenderness  Genitourinary:  Normal.  Rectal:   Extremities:  No edema, no calf tenderrness,  Neurological: AxAxOx3                    LABS:                        15.2   6.6   )-----------( 167      ( 16 Sep 2017 03:59 )             45.7     09-16    142  |  107  |  13  ----------------------------<  109<H>  3.8   |  24  |  1.31<H>    Ca    8.5      16 Sep 2017 03:59  Phos  3.3     09-16  Mg     2.3     09-16    TPro  7.0  /  Alb  3.2<L>  /  TBili  1.5<H>  /  DBili  .37<H>  /  AST  58<H>  /  ALT  189<H>  /  AlkPhos  180<H>  09-15      LIVER FUNCTIONS - ( 15 Sep 2017 08:10 )  Alb: 3.2 g/dL / Pro: 7.0 gm/dL / ALK PHOS: 180 U/L / ALT: 189 U/L / AST: 58 U/L / GGT: x               RADIOLOGY & ADDITIONAL STUDIES:    HEALTH ISSUES - PROBLEM Dx:  LFTs abnormal: LFTs abnormal  Coronary artery disease involving native coronary artery of native heart without angina pectoris:    Chronic systolic heart failure: Chronic systolic heart failure  Diabetes mellitus type 2, diet-controlled: Diabetes mellitus type 2, diet-controlled  Essential hypertension: Essential hypertension  BPH (Benign Prostatic Hyperplasia): BPH (Benign Prostatic Hyperplasia)  Myocardial infarct:  History  CAD (coronary artery disease): CAD (coronary artery disease)  Cholecystitis: Cholecystitis.  Possible Gallbladder Ca.

## 2017-09-17 LAB
ALBUMIN SERPL ELPH-MCNC: 3.2 G/DL — LOW (ref 3.3–5)
ALP SERPL-CCNC: 131 U/L — HIGH (ref 40–120)
ALT FLD-CCNC: 99 U/L — HIGH (ref 12–78)
ANION GAP SERPL CALC-SCNC: 9 MMOL/L — SIGNIFICANT CHANGE UP (ref 5–17)
APTT BLD: 33 SEC — SIGNIFICANT CHANGE UP (ref 27.5–37.4)
AST SERPL-CCNC: 27 U/L — SIGNIFICANT CHANGE UP (ref 15–37)
BILIRUB SERPL-MCNC: 1.1 MG/DL — SIGNIFICANT CHANGE UP (ref 0.2–1.2)
BLD GP AB SCN SERPL QL: SIGNIFICANT CHANGE UP
BUN SERPL-MCNC: 15 MG/DL — SIGNIFICANT CHANGE UP (ref 7–23)
CALCIUM SERPL-MCNC: 8.6 MG/DL — SIGNIFICANT CHANGE UP (ref 8.5–10.1)
CHLORIDE SERPL-SCNC: 107 MMOL/L — SIGNIFICANT CHANGE UP (ref 96–108)
CO2 SERPL-SCNC: 26 MMOL/L — SIGNIFICANT CHANGE UP (ref 22–31)
CREAT SERPL-MCNC: 1.37 MG/DL — HIGH (ref 0.5–1.3)
GLUCOSE SERPL-MCNC: 114 MG/DL — HIGH (ref 70–99)
HCT VFR BLD CALC: 41.4 % — SIGNIFICANT CHANGE UP (ref 39–50)
HGB BLD-MCNC: 13.8 G/DL — SIGNIFICANT CHANGE UP (ref 13–17)
INR BLD: 1.14 RATIO — SIGNIFICANT CHANGE UP (ref 0.88–1.16)
MAGNESIUM SERPL-MCNC: 2.3 MG/DL — SIGNIFICANT CHANGE UP (ref 1.6–2.6)
MCHC RBC-ENTMCNC: 31.2 PG — SIGNIFICANT CHANGE UP (ref 27–34)
MCHC RBC-ENTMCNC: 33.4 GM/DL — SIGNIFICANT CHANGE UP (ref 32–36)
MCV RBC AUTO: 93.3 FL — SIGNIFICANT CHANGE UP (ref 80–100)
PHOSPHATE SERPL-MCNC: 3.3 MG/DL — SIGNIFICANT CHANGE UP (ref 2.5–4.5)
PLATELET # BLD AUTO: 178 K/UL — SIGNIFICANT CHANGE UP (ref 150–400)
POTASSIUM SERPL-MCNC: 3.7 MMOL/L — SIGNIFICANT CHANGE UP (ref 3.5–5.3)
POTASSIUM SERPL-SCNC: 3.7 MMOL/L — SIGNIFICANT CHANGE UP (ref 3.5–5.3)
PROT SERPL-MCNC: 7.1 GM/DL — SIGNIFICANT CHANGE UP (ref 6–8.3)
PROTHROM AB SERPL-ACNC: 12.5 SEC — SIGNIFICANT CHANGE UP (ref 9.8–12.7)
RBC # BLD: 4.44 M/UL — SIGNIFICANT CHANGE UP (ref 4.2–5.8)
RBC # FLD: 13 % — SIGNIFICANT CHANGE UP (ref 11–15)
SODIUM SERPL-SCNC: 142 MMOL/L — SIGNIFICANT CHANGE UP (ref 135–145)
WBC # BLD: 6.1 K/UL — SIGNIFICANT CHANGE UP (ref 3.8–10.5)
WBC # FLD AUTO: 6.1 K/UL — SIGNIFICANT CHANGE UP (ref 3.8–10.5)

## 2017-09-17 PROCEDURE — 71010: CPT | Mod: 26

## 2017-09-17 RX ADMIN — ATORVASTATIN CALCIUM 10 MILLIGRAM(S): 80 TABLET, FILM COATED ORAL at 21:42

## 2017-09-17 RX ADMIN — Medication 81 MILLIGRAM(S): at 11:20

## 2017-09-17 RX ADMIN — PANTOPRAZOLE SODIUM 40 MILLIGRAM(S): 20 TABLET, DELAYED RELEASE ORAL at 05:43

## 2017-09-17 RX ADMIN — CARVEDILOL PHOSPHATE 12.5 MILLIGRAM(S): 80 CAPSULE, EXTENDED RELEASE ORAL at 17:17

## 2017-09-17 RX ADMIN — TAMSULOSIN HYDROCHLORIDE 0.4 MILLIGRAM(S): 0.4 CAPSULE ORAL at 21:42

## 2017-09-17 RX ADMIN — Medication 12.5 MILLIGRAM(S): at 21:43

## 2017-09-17 RX ADMIN — HEPARIN SODIUM 5000 UNIT(S): 5000 INJECTION INTRAVENOUS; SUBCUTANEOUS at 17:17

## 2017-09-17 RX ADMIN — PIPERACILLIN AND TAZOBACTAM 25 GRAM(S): 4; .5 INJECTION, POWDER, LYOPHILIZED, FOR SOLUTION INTRAVENOUS at 05:42

## 2017-09-17 RX ADMIN — SODIUM CHLORIDE 30 MILLILITER(S): 9 INJECTION, SOLUTION INTRAVENOUS at 05:48

## 2017-09-17 RX ADMIN — HEPARIN SODIUM 5000 UNIT(S): 5000 INJECTION INTRAVENOUS; SUBCUTANEOUS at 05:42

## 2017-09-17 RX ADMIN — PIPERACILLIN AND TAZOBACTAM 25 GRAM(S): 4; .5 INJECTION, POWDER, LYOPHILIZED, FOR SOLUTION INTRAVENOUS at 13:10

## 2017-09-17 RX ADMIN — PIPERACILLIN AND TAZOBACTAM 25 GRAM(S): 4; .5 INJECTION, POWDER, LYOPHILIZED, FOR SOLUTION INTRAVENOUS at 21:42

## 2017-09-17 NOTE — PROGRESS NOTE ADULT - SUBJECTIVE AND OBJECTIVE BOX
Pt feels good; no complaints  for lap joellen tomorrow    MEDICATIONS  (STANDING):  heparin  Injectable 5000 Unit(s) SubCutaneous every 12 hours  piperacillin/tazobactam IVPB. 3.375 Gram(s) IV Intermittent every 8 hours  aspirin  chewable 81 milliGRAM(s) Oral daily  tamsulosin 0.4 milliGRAM(s) Oral at bedtime  atorvastatin 10 milliGRAM(s) Oral at bedtime  hydrochlorothiazide 12.5 milliGRAM(s) Oral at bedtime  valsartan 80 milliGRAM(s) Oral daily  carvedilol 12.5 milliGRAM(s) Oral every 12 hours  pantoprazole    Tablet 40 milliGRAM(s) Oral before breakfast  insulin lispro (HumaLOG) corrective regimen sliding scale   SubCutaneous three times a day before meals  insulin lispro (HumaLOG) corrective regimen sliding scale   SubCutaneous at bedtime  dextrose 5%. 1000 milliLiter(s) (50 mL/Hr) IV Continuous <Continuous>  dextrose 50% Injectable 12.5 Gram(s) IV Push once  dextrose 50% Injectable 25 Gram(s) IV Push once  dextrose 50% Injectable 25 Gram(s) IV Push once  dextrose 5% + sodium chloride 0.45%. 1000 milliLiter(s) (30 mL/Hr) IV Continuous <Continuous>    MEDICATIONS  (PRN):  aluminum hydroxide/magnesium hydroxide/simethicone Suspension 30 milliLiter(s) Oral every 4 hours PRN Dyspepsia  acetaminophen   Tablet 650 milliGRAM(s) Oral every 6 hours PRN For Temp greater than 38 C (100.4 F)  acetaminophen   Tablet. 650 milliGRAM(s) Oral every 6 hours PRN Mild Pain (1 - 3)  ondansetron Injectable 4 milliGRAM(s) IV Push every 6 hours PRN Nausea  dextrose Gel 1 Dose(s) Oral once PRN Blood Glucose LESS THAN 70 milliGRAM(s)/deciliter  glucagon  Injectable 1 milliGRAM(s) IntraMuscular once PRN Glucose LESS THAN 70 milligrams/deciliter      Allergies    No Known Allergies    Intolerances        Vital Signs Last 24 Hrs  T(C): 37 (17 Sep 2017 11:03), Max: 37 (17 Sep 2017 11:03)  T(F): 98.6 (17 Sep 2017 11:03), Max: 98.6 (17 Sep 2017 11:03)  HR: 70 (17 Sep 2017 11:03) (61 - 72)  BP: 130/70 (17 Sep 2017 11:03) (108/63 - 146/69)  BP(mean): --  RR: 18 (17 Sep 2017 11:03) (17 - 18)  SpO2: 95% (17 Sep 2017 11:03) (93% - 95%)    PHYSICAL EXAM:  General: NAD.  CVS: S1, S2  Chest: air entry bilaterally present  Abd: BS present, soft, non-tender      LABS:                        13.8   6.1   )-----------( 178      ( 17 Sep 2017 06:50 )             41.4     09-17    142  |  107  |  15  ----------------------------<  114<H>  3.7   |  26  |  1.37<H>    Ca    8.6      17 Sep 2017 06:50  Phos  3.3     09-17  Mg     2.3     09-17    TPro  7.1  /  Alb  3.2<L>  /  TBili  1.1  /  DBili  x   /  AST  27  /  ALT  99<H>  /  AlkPhos  131<H>  09-17    PT/INR - ( 17 Sep 2017 06:50 )   PT: 12.5 sec;   INR: 1.14 ratio         PTT - ( 17 Sep 2017 06:50 )  PTT:33.0 sec    for lap joellen tomorrow  as per surgery

## 2017-09-17 NOTE — PROGRESS NOTE ADULT - SUBJECTIVE AND OBJECTIVE BOX
INTERVAL HPI/OVERNIGHT EVENTS:    Patient lying comfortably.  No complaint of abdominal pain.  Tolerating diet with no complaint of nausea/vomiting.  +Flatus/BM    Vital Signs Last 24 Hrs  T(C): 37 (17 Sep 2017 11:03), Max: 37 (17 Sep 2017 11:03)  T(F): 98.6 (17 Sep 2017 11:03), Max: 98.6 (17 Sep 2017 11:03)  HR: 70 (17 Sep 2017 11:03) (61 - 72)  BP: 130/70 (17 Sep 2017 11:03) (108/63 - 146/69)  BP(mean): --  RR: 18 (17 Sep 2017 11:03) (17 - 18)  SpO2: 95% (17 Sep 2017 11:03) (93% - 95%)    MEDICATIONS  (STANDING):  heparin  Injectable 5000 Unit(s) SubCutaneous every 12 hours  piperacillin/tazobactam IVPB. 3.375 Gram(s) IV Intermittent every 8 hours  aspirin  chewable 81 milliGRAM(s) Oral daily  tamsulosin 0.4 milliGRAM(s) Oral at bedtime  atorvastatin 10 milliGRAM(s) Oral at bedtime  hydrochlorothiazide 12.5 milliGRAM(s) Oral at bedtime  valsartan 80 milliGRAM(s) Oral daily  carvedilol 12.5 milliGRAM(s) Oral every 12 hours  pantoprazole    Tablet 40 milliGRAM(s) Oral before breakfast  insulin lispro (HumaLOG) corrective regimen sliding scale   SubCutaneous three times a day before meals  insulin lispro (HumaLOG) corrective regimen sliding scale   SubCutaneous at bedtime  dextrose 5%. 1000 milliLiter(s) (50 mL/Hr) IV Continuous <Continuous>  dextrose 50% Injectable 12.5 Gram(s) IV Push once  dextrose 50% Injectable 25 Gram(s) IV Push once  dextrose 50% Injectable 25 Gram(s) IV Push once  dextrose 5% + sodium chloride 0.45%. 1000 milliLiter(s) (30 mL/Hr) IV Continuous <Continuous>    MEDICATIONS  (PRN):  aluminum hydroxide/magnesium hydroxide/simethicone Suspension 30 milliLiter(s) Oral every 4 hours PRN Dyspepsia  acetaminophen   Tablet 650 milliGRAM(s) Oral every 6 hours PRN For Temp greater than 38 C (100.4 F)  acetaminophen   Tablet. 650 milliGRAM(s) Oral every 6 hours PRN Mild Pain (1 - 3)  ondansetron Injectable 4 milliGRAM(s) IV Push every 6 hours PRN Nausea  dextrose Gel 1 Dose(s) Oral once PRN Blood Glucose LESS THAN 70 milliGRAM(s)/deciliter  glucagon  Injectable 1 milliGRAM(s) IntraMuscular once PRN Glucose LESS THAN 70 milligrams/deciliter      PHYSICAL EXAM:    GENERAL: NAD  HEAD:  Atraumatic, Normocephalic  EYES: EOMI, PERRLA, conjunctiva and sclera clear  CHEST/LUNG: Clear to ausculation, bilaterally   HEART: S1S2  ABDOMEN: non distended, +BS, soft, non tender, no guarding  EXTREMITIES:  calf soft, non tender     I&O's Detail    16 Sep 2017 07:01  -  17 Sep 2017 07:00  --------------------------------------------------------  IN:    dextrose 5% + sodium chloride 0.45%.: 660 mL    Oral Fluid: 240 mL    Solution: 300 mL  Total IN: 1200 mL    OUT:    Voided: 900 mL  Total OUT: 900 mL    Total NET: 300 mL      17 Sep 2017 07:01  -  17 Sep 2017 12:57  --------------------------------------------------------  IN:    dextrose 5% + sodium chloride 0.45%.: 120 mL  Total IN: 120 mL    OUT:  Total OUT: 0 mL    Total NET: 120 mL          LABS:                        13.8   6.1   )-----------( 178      ( 17 Sep 2017 06:50 )             41.4     09-17    142  |  107  |  15  ----------------------------<  114<H>  3.7   |  26  |  1.37<H>    Ca    8.6      17 Sep 2017 06:50  Phos  3.3     09-17  Mg     2.3     09-17    TPro  7.1  /  Alb  3.2<L>  /  TBili  1.1  /  DBili  x   /  AST  27  /  ALT  99<H>  /  AlkPhos  131<H>  09-17    PT/INR - ( 17 Sep 2017 06:50 )   PT: 12.5 sec;   INR: 1.14 ratio         PTT - ( 17 Sep 2017 06:50 )  PTT:33.0 sec      type    Bpos neg antibody      Impression:    HEALTH ISSUES - PROBLEM Dx:  LFTs abnormal: LFTs abnormal  Coronary artery disease involving native coronary artery of native heart without angina pectoris:    Chronic systolic heart failure: Chronic systolic heart failure  Diabetes mellitus type 2, diet-controlled: Diabetes mellitus type 2, diet-controlled  Essential hypertension: Essential hypertension  BPH (Benign Prostatic Hyperplasia): BPH (Benign Prostatic Hyperplasia)  Myocardial infarct:  History  CAD (coronary artery disease): CAD (coronary artery disease)  Cholecystitis: Cholecystitis.  Possible Gallbladder Ca.        Abdominal pain improved.  Cholelithiasis, Cholecystitis.  Possible Gallbladder Ca.    Plan:    Lap-Cholecystectomy, possible open  on Monday.  Continue current management.  preop labs noted   type and screen done   NPO p MN   GI/Cardio follow up.   will discuss with Dr. Sheffield

## 2017-09-18 ENCOUNTER — RESULT REVIEW (OUTPATIENT)
Age: 82
End: 2017-09-18

## 2017-09-18 LAB
ANION GAP SERPL CALC-SCNC: 11 MMOL/L — SIGNIFICANT CHANGE UP (ref 5–17)
ANION GAP SERPL CALC-SCNC: 9 MMOL/L — SIGNIFICANT CHANGE UP (ref 5–17)
BLD GP AB SCN SERPL QL: SIGNIFICANT CHANGE UP
BUN SERPL-MCNC: 19 MG/DL — SIGNIFICANT CHANGE UP (ref 7–23)
BUN SERPL-MCNC: 19 MG/DL — SIGNIFICANT CHANGE UP (ref 7–23)
CALCIUM SERPL-MCNC: 8.2 MG/DL — LOW (ref 8.5–10.1)
CALCIUM SERPL-MCNC: 8.8 MG/DL — SIGNIFICANT CHANGE UP (ref 8.5–10.1)
CHLORIDE SERPL-SCNC: 105 MMOL/L — SIGNIFICANT CHANGE UP (ref 96–108)
CHLORIDE SERPL-SCNC: 107 MMOL/L — SIGNIFICANT CHANGE UP (ref 96–108)
CO2 SERPL-SCNC: 24 MMOL/L — SIGNIFICANT CHANGE UP (ref 22–31)
CO2 SERPL-SCNC: 24 MMOL/L — SIGNIFICANT CHANGE UP (ref 22–31)
CREAT SERPL-MCNC: 1.43 MG/DL — HIGH (ref 0.5–1.3)
CREAT SERPL-MCNC: 1.48 MG/DL — HIGH (ref 0.5–1.3)
GLUCOSE SERPL-MCNC: 111 MG/DL — HIGH (ref 70–99)
GLUCOSE SERPL-MCNC: 160 MG/DL — HIGH (ref 70–99)
HCT VFR BLD CALC: 45 % — SIGNIFICANT CHANGE UP (ref 39–50)
HCT VFR BLD CALC: 45.5 % — SIGNIFICANT CHANGE UP (ref 39–50)
HGB BLD-MCNC: 14.9 G/DL — SIGNIFICANT CHANGE UP (ref 13–17)
HGB BLD-MCNC: 15 G/DL — SIGNIFICANT CHANGE UP (ref 13–17)
MAGNESIUM SERPL-MCNC: 2.4 MG/DL — SIGNIFICANT CHANGE UP (ref 1.6–2.6)
MCHC RBC-ENTMCNC: 31.8 PG — SIGNIFICANT CHANGE UP (ref 27–34)
MCHC RBC-ENTMCNC: 32.1 PG — SIGNIFICANT CHANGE UP (ref 27–34)
MCHC RBC-ENTMCNC: 32.7 GM/DL — SIGNIFICANT CHANGE UP (ref 32–36)
MCHC RBC-ENTMCNC: 33.2 GM/DL — SIGNIFICANT CHANGE UP (ref 32–36)
MCV RBC AUTO: 96.6 FL — SIGNIFICANT CHANGE UP (ref 80–100)
MCV RBC AUTO: 97.2 FL — SIGNIFICANT CHANGE UP (ref 80–100)
PHOSPHATE SERPL-MCNC: 3.1 MG/DL — SIGNIFICANT CHANGE UP (ref 2.5–4.5)
PLATELET # BLD AUTO: 175 K/UL — SIGNIFICANT CHANGE UP (ref 150–400)
PLATELET # BLD AUTO: 188 K/UL — SIGNIFICANT CHANGE UP (ref 150–400)
POTASSIUM SERPL-MCNC: 4 MMOL/L — SIGNIFICANT CHANGE UP (ref 3.5–5.3)
POTASSIUM SERPL-MCNC: 4.7 MMOL/L — SIGNIFICANT CHANGE UP (ref 3.5–5.3)
POTASSIUM SERPL-SCNC: 4 MMOL/L — SIGNIFICANT CHANGE UP (ref 3.5–5.3)
POTASSIUM SERPL-SCNC: 4.7 MMOL/L — SIGNIFICANT CHANGE UP (ref 3.5–5.3)
RBC # BLD: 4.66 M/UL — SIGNIFICANT CHANGE UP (ref 4.2–5.8)
RBC # BLD: 4.68 M/UL — SIGNIFICANT CHANGE UP (ref 4.2–5.8)
RBC # FLD: 12.9 % — SIGNIFICANT CHANGE UP (ref 11–15)
RBC # FLD: 13.2 % — SIGNIFICANT CHANGE UP (ref 11–15)
SODIUM SERPL-SCNC: 140 MMOL/L — SIGNIFICANT CHANGE UP (ref 135–145)
SODIUM SERPL-SCNC: 140 MMOL/L — SIGNIFICANT CHANGE UP (ref 135–145)
WBC # BLD: 6.8 K/UL — SIGNIFICANT CHANGE UP (ref 3.8–10.5)
WBC # BLD: 9 K/UL — SIGNIFICANT CHANGE UP (ref 3.8–10.5)
WBC # FLD AUTO: 6.8 K/UL — SIGNIFICANT CHANGE UP (ref 3.8–10.5)
WBC # FLD AUTO: 9 K/UL — SIGNIFICANT CHANGE UP (ref 3.8–10.5)

## 2017-09-18 PROCEDURE — 88304 TISSUE EXAM BY PATHOLOGIST: CPT | Mod: 26

## 2017-09-18 PROCEDURE — 88302 TISSUE EXAM BY PATHOLOGIST: CPT | Mod: 26

## 2017-09-18 RX ORDER — DEXTROSE 50 % IN WATER 50 %
12.5 SYRINGE (ML) INTRAVENOUS ONCE
Qty: 0 | Refills: 0 | Status: DISCONTINUED | OUTPATIENT
Start: 2017-09-18 | End: 2017-09-28

## 2017-09-18 RX ORDER — INSULIN LISPRO 100/ML
VIAL (ML) SUBCUTANEOUS
Qty: 0 | Refills: 0 | Status: DISCONTINUED | OUTPATIENT
Start: 2017-09-18 | End: 2017-09-28

## 2017-09-18 RX ORDER — DEXTROSE 50 % IN WATER 50 %
1 SYRINGE (ML) INTRAVENOUS ONCE
Qty: 0 | Refills: 0 | Status: DISCONTINUED | OUTPATIENT
Start: 2017-09-18 | End: 2017-09-28

## 2017-09-18 RX ORDER — GLUCAGON INJECTION, SOLUTION 0.5 MG/.1ML
1 INJECTION, SOLUTION SUBCUTANEOUS ONCE
Qty: 0 | Refills: 0 | Status: DISCONTINUED | OUTPATIENT
Start: 2017-09-18 | End: 2017-09-28

## 2017-09-18 RX ORDER — PIPERACILLIN AND TAZOBACTAM 4; .5 G/20ML; G/20ML
3.38 INJECTION, POWDER, LYOPHILIZED, FOR SOLUTION INTRAVENOUS EVERY 8 HOURS
Qty: 0 | Refills: 0 | Status: DISCONTINUED | OUTPATIENT
Start: 2017-09-18 | End: 2017-09-20

## 2017-09-18 RX ORDER — SODIUM CHLORIDE 9 MG/ML
500 INJECTION INTRAMUSCULAR; INTRAVENOUS; SUBCUTANEOUS ONCE
Qty: 0 | Refills: 0 | Status: COMPLETED | OUTPATIENT
Start: 2017-09-18 | End: 2017-09-18

## 2017-09-18 RX ORDER — SODIUM CHLORIDE 9 MG/ML
1000 INJECTION, SOLUTION INTRAVENOUS
Qty: 0 | Refills: 0 | Status: DISCONTINUED | OUTPATIENT
Start: 2017-09-18 | End: 2017-09-18

## 2017-09-18 RX ORDER — ONDANSETRON 8 MG/1
4 TABLET, FILM COATED ORAL ONCE
Qty: 0 | Refills: 0 | Status: DISCONTINUED | OUTPATIENT
Start: 2017-09-18 | End: 2017-09-18

## 2017-09-18 RX ORDER — DEXTROSE 50 % IN WATER 50 %
25 SYRINGE (ML) INTRAVENOUS ONCE
Qty: 0 | Refills: 0 | Status: DISCONTINUED | OUTPATIENT
Start: 2017-09-18 | End: 2017-09-28

## 2017-09-18 RX ORDER — SODIUM CHLORIDE 9 MG/ML
1000 INJECTION, SOLUTION INTRAVENOUS
Qty: 0 | Refills: 0 | Status: DISCONTINUED | OUTPATIENT
Start: 2017-09-18 | End: 2017-09-19

## 2017-09-18 RX ORDER — FENTANYL CITRATE 50 UG/ML
25 INJECTION INTRAVENOUS
Qty: 0 | Refills: 0 | Status: DISCONTINUED | OUTPATIENT
Start: 2017-09-18 | End: 2017-09-18

## 2017-09-18 RX ORDER — HEPARIN SODIUM 5000 [USP'U]/ML
5000 INJECTION INTRAVENOUS; SUBCUTANEOUS EVERY 12 HOURS
Qty: 0 | Refills: 0 | Status: DISCONTINUED | OUTPATIENT
Start: 2017-09-19 | End: 2017-09-28

## 2017-09-18 RX ORDER — ONDANSETRON 8 MG/1
4 TABLET, FILM COATED ORAL EVERY 6 HOURS
Qty: 0 | Refills: 0 | Status: DISCONTINUED | OUTPATIENT
Start: 2017-09-18 | End: 2017-09-28

## 2017-09-18 RX ORDER — ONDANSETRON 8 MG/1
4 TABLET, FILM COATED ORAL EVERY 6 HOURS
Qty: 0 | Refills: 0 | Status: DISCONTINUED | OUTPATIENT
Start: 2017-09-18 | End: 2017-09-18

## 2017-09-18 RX ORDER — INSULIN LISPRO 100/ML
VIAL (ML) SUBCUTANEOUS AT BEDTIME
Qty: 0 | Refills: 0 | Status: DISCONTINUED | OUTPATIENT
Start: 2017-09-18 | End: 2017-09-28

## 2017-09-18 RX ORDER — MORPHINE SULFATE 50 MG/1
2 CAPSULE, EXTENDED RELEASE ORAL EVERY 4 HOURS
Qty: 0 | Refills: 0 | Status: DISCONTINUED | OUTPATIENT
Start: 2017-09-18 | End: 2017-09-19

## 2017-09-18 RX ORDER — ACETAMINOPHEN 500 MG
1000 TABLET ORAL ONCE
Qty: 0 | Refills: 0 | Status: COMPLETED | OUTPATIENT
Start: 2017-09-18 | End: 2017-09-18

## 2017-09-18 RX ADMIN — MORPHINE SULFATE 2 MILLIGRAM(S): 50 CAPSULE, EXTENDED RELEASE ORAL at 18:01

## 2017-09-18 RX ADMIN — SODIUM CHLORIDE 65 MILLILITER(S): 9 INJECTION, SOLUTION INTRAVENOUS at 15:59

## 2017-09-18 RX ADMIN — MORPHINE SULFATE 2 MILLIGRAM(S): 50 CAPSULE, EXTENDED RELEASE ORAL at 18:16

## 2017-09-18 RX ADMIN — MORPHINE SULFATE 2 MILLIGRAM(S): 50 CAPSULE, EXTENDED RELEASE ORAL at 22:08

## 2017-09-18 RX ADMIN — PIPERACILLIN AND TAZOBACTAM 25 GRAM(S): 4; .5 INJECTION, POWDER, LYOPHILIZED, FOR SOLUTION INTRAVENOUS at 05:43

## 2017-09-18 RX ADMIN — Medication 400 MILLIGRAM(S): at 15:59

## 2017-09-18 RX ADMIN — MORPHINE SULFATE 2 MILLIGRAM(S): 50 CAPSULE, EXTENDED RELEASE ORAL at 22:23

## 2017-09-18 RX ADMIN — FENTANYL CITRATE 25 MICROGRAM(S): 50 INJECTION INTRAVENOUS at 16:00

## 2017-09-18 RX ADMIN — PANTOPRAZOLE SODIUM 40 MILLIGRAM(S): 20 TABLET, DELAYED RELEASE ORAL at 05:44

## 2017-09-18 RX ADMIN — PIPERACILLIN AND TAZOBACTAM 25 GRAM(S): 4; .5 INJECTION, POWDER, LYOPHILIZED, FOR SOLUTION INTRAVENOUS at 19:55

## 2017-09-18 NOTE — BRIEF OPERATIVE NOTE - PRE-OP DX
Cholelithiasis with acute on chronic cholecystitis  09/18/2017  R/O Gallblader Carcinoma.  Active  Jorge Sheffield

## 2017-09-18 NOTE — BRIEF OPERATIVE NOTE - PROCEDURE
<<-----Click on this checkbox to enter Procedure Cholecystectomy, open  09/18/2017  Attempted Laparoscopic Cholecystectomy, converted to open.  Active  VTARANTO

## 2017-09-18 NOTE — CHART NOTE - NSCHARTNOTEFT_GEN_A_CORE
I am seeing patient in consultation however pt was unavailable as was in OR by the the primary  surgical team . will see on tomorrow .

## 2017-09-19 DIAGNOSIS — K80.01 CALCULUS OF GALLBLADDER WITH ACUTE CHOLECYSTITIS WITH OBSTRUCTION: ICD-10-CM

## 2017-09-19 LAB
ALBUMIN SERPL ELPH-MCNC: 3 G/DL — LOW (ref 3.3–5)
ALP SERPL-CCNC: 98 U/L — SIGNIFICANT CHANGE UP (ref 40–120)
ALT FLD-CCNC: 95 U/L — HIGH (ref 12–78)
ANION GAP SERPL CALC-SCNC: 10 MMOL/L — SIGNIFICANT CHANGE UP (ref 5–17)
AST SERPL-CCNC: 86 U/L — HIGH (ref 15–37)
BILIRUB SERPL-MCNC: 1.7 MG/DL — HIGH (ref 0.2–1.2)
BUN SERPL-MCNC: 24 MG/DL — HIGH (ref 7–23)
CALCIUM SERPL-MCNC: 8.3 MG/DL — LOW (ref 8.5–10.1)
CHLORIDE SERPL-SCNC: 105 MMOL/L — SIGNIFICANT CHANGE UP (ref 96–108)
CO2 SERPL-SCNC: 25 MMOL/L — SIGNIFICANT CHANGE UP (ref 22–31)
CREAT SERPL-MCNC: 1.46 MG/DL — HIGH (ref 0.5–1.3)
GLUCOSE SERPL-MCNC: 144 MG/DL — HIGH (ref 70–99)
HCT VFR BLD CALC: 43 % — SIGNIFICANT CHANGE UP (ref 39–50)
HGB BLD-MCNC: 14.4 G/DL — SIGNIFICANT CHANGE UP (ref 13–17)
MCHC RBC-ENTMCNC: 31.4 PG — SIGNIFICANT CHANGE UP (ref 27–34)
MCHC RBC-ENTMCNC: 33.5 GM/DL — SIGNIFICANT CHANGE UP (ref 32–36)
MCV RBC AUTO: 93.6 FL — SIGNIFICANT CHANGE UP (ref 80–100)
PLATELET # BLD AUTO: 187 K/UL — SIGNIFICANT CHANGE UP (ref 150–400)
POTASSIUM SERPL-MCNC: 4.3 MMOL/L — SIGNIFICANT CHANGE UP (ref 3.5–5.3)
POTASSIUM SERPL-SCNC: 4.3 MMOL/L — SIGNIFICANT CHANGE UP (ref 3.5–5.3)
PROT SERPL-MCNC: 6.8 GM/DL — SIGNIFICANT CHANGE UP (ref 6–8.3)
RBC # BLD: 4.6 M/UL — SIGNIFICANT CHANGE UP (ref 4.2–5.8)
RBC # FLD: 13.1 % — SIGNIFICANT CHANGE UP (ref 11–15)
SODIUM SERPL-SCNC: 140 MMOL/L — SIGNIFICANT CHANGE UP (ref 135–145)
WBC # BLD: 10.9 K/UL — HIGH (ref 3.8–10.5)
WBC # FLD AUTO: 10.9 K/UL — HIGH (ref 3.8–10.5)

## 2017-09-19 PROCEDURE — 99231 SBSQ HOSP IP/OBS SF/LOW 25: CPT

## 2017-09-19 RX ORDER — SODIUM CHLORIDE 9 MG/ML
1000 INJECTION, SOLUTION INTRAVENOUS
Qty: 0 | Refills: 0 | Status: DISCONTINUED | OUTPATIENT
Start: 2017-09-19 | End: 2017-09-20

## 2017-09-19 RX ORDER — HYDROMORPHONE HYDROCHLORIDE 2 MG/ML
1 INJECTION INTRAMUSCULAR; INTRAVENOUS; SUBCUTANEOUS EVERY 6 HOURS
Qty: 0 | Refills: 0 | Status: DISCONTINUED | OUTPATIENT
Start: 2017-09-19 | End: 2017-09-23

## 2017-09-19 RX ORDER — MORPHINE SULFATE 50 MG/1
4 CAPSULE, EXTENDED RELEASE ORAL EVERY 6 HOURS
Qty: 0 | Refills: 0 | Status: DISCONTINUED | OUTPATIENT
Start: 2017-09-19 | End: 2017-09-23

## 2017-09-19 RX ORDER — ELECTROLYTE SOLUTION,INJ
1 VIAL (ML) INTRAVENOUS
Qty: 0 | Refills: 0 | Status: DISCONTINUED | OUTPATIENT
Start: 2017-09-19 | End: 2017-09-19

## 2017-09-19 RX ADMIN — HYDROMORPHONE HYDROCHLORIDE 1 MILLIGRAM(S): 2 INJECTION INTRAMUSCULAR; INTRAVENOUS; SUBCUTANEOUS at 22:39

## 2017-09-19 RX ADMIN — MORPHINE SULFATE 2 MILLIGRAM(S): 50 CAPSULE, EXTENDED RELEASE ORAL at 08:55

## 2017-09-19 RX ADMIN — SODIUM CHLORIDE 500 MILLILITER(S): 9 INJECTION INTRAMUSCULAR; INTRAVENOUS; SUBCUTANEOUS at 00:02

## 2017-09-19 RX ADMIN — HEPARIN SODIUM 5000 UNIT(S): 5000 INJECTION INTRAVENOUS; SUBCUTANEOUS at 05:50

## 2017-09-19 RX ADMIN — PIPERACILLIN AND TAZOBACTAM 25 GRAM(S): 4; .5 INJECTION, POWDER, LYOPHILIZED, FOR SOLUTION INTRAVENOUS at 11:00

## 2017-09-19 RX ADMIN — SODIUM CHLORIDE 100 MILLILITER(S): 9 INJECTION, SOLUTION INTRAVENOUS at 01:11

## 2017-09-19 RX ADMIN — MORPHINE SULFATE 4 MILLIGRAM(S): 50 CAPSULE, EXTENDED RELEASE ORAL at 18:15

## 2017-09-19 RX ADMIN — Medication 1 EACH: at 22:34

## 2017-09-19 RX ADMIN — HEPARIN SODIUM 5000 UNIT(S): 5000 INJECTION INTRAVENOUS; SUBCUTANEOUS at 17:55

## 2017-09-19 RX ADMIN — PIPERACILLIN AND TAZOBACTAM 25 GRAM(S): 4; .5 INJECTION, POWDER, LYOPHILIZED, FOR SOLUTION INTRAVENOUS at 03:26

## 2017-09-19 RX ADMIN — MORPHINE SULFATE 4 MILLIGRAM(S): 50 CAPSULE, EXTENDED RELEASE ORAL at 17:59

## 2017-09-19 RX ADMIN — PIPERACILLIN AND TAZOBACTAM 25 GRAM(S): 4; .5 INJECTION, POWDER, LYOPHILIZED, FOR SOLUTION INTRAVENOUS at 19:40

## 2017-09-19 RX ADMIN — MORPHINE SULFATE 2 MILLIGRAM(S): 50 CAPSULE, EXTENDED RELEASE ORAL at 08:38

## 2017-09-19 RX ADMIN — SODIUM CHLORIDE 100 MILLILITER(S): 9 INJECTION, SOLUTION INTRAVENOUS at 11:00

## 2017-09-19 RX ADMIN — HYDROMORPHONE HYDROCHLORIDE 1 MILLIGRAM(S): 2 INJECTION INTRAMUSCULAR; INTRAVENOUS; SUBCUTANEOUS at 22:54

## 2017-09-19 NOTE — PHYSICAL THERAPY INITIAL EVALUATION ADULT - GENERAL OBSERVATIONS, REHAB EVAL
Pt seen supine in bed,alert and Ox4, NG tube intact, nasal cannula intact, pneumatic compression pumps intact. Pt /80, HR 75, O2 98% on suppelemental O2.

## 2017-09-19 NOTE — PROGRESS NOTE ADULT - SUBJECTIVE AND OBJECTIVE BOX
POST OP NOTE  Pt seen at bedside s/p Open cholecystectomy ( failed Laproscopic joellen). Pt comfortable with mild incisional pain. Denies nausea, vomiting.  Vital Signs Last 24 Hrs  T(C): 36.1 (18 Sep 2017 23:45), Max: 36.7 (18 Sep 2017 16:11)  T(F): 97 (18 Sep 2017 23:45), Max: 98 (18 Sep 2017 16:11)  HR: 89 (18 Sep 2017 23:45) (62 - 98)  BP: 160/91 (18 Sep 2017 23:45) (112/62 - 160/91)  BP(mean): --  RR: 17 (18 Sep 2017 23:45) (15 - 20)  SpO2: 96% (18 Sep 2017 23:45) (95% - 100%) TONY drain 65ml   Chest : fair BS bilaterally  CVS : RRR no murmurs  Abd: RUQ dressing Clean, Dry Intact + TONY with serosanguinous drainage. + BS non tender all quadrants  PVS: warm , no edema + IPC bilat.  Neuro: Awake, alert no gross deficits POST OP NOTE  Pt seen at bedside s/p Open cholecystectomy ( Attempted  Laparoscopic  joellen). Pt comfortable with mild incisional pain. Denies nausea, vomiting.  Vital Signs Last 24 Hrs  T(C): 36.1 (18 Sep 2017 23:45), Max: 36.7 (18 Sep 2017 16:11)  T(F): 97 (18 Sep 2017 23:45), Max: 98 (18 Sep 2017 16:11)  HR: 89 (18 Sep 2017 23:45) (62 - 98)  BP: 160/91 (18 Sep 2017 23:45) (112/62 - 160/91)  BP(mean): --  RR: 17 (18 Sep 2017 23:45) (15 - 20)  SpO2: 96% (18 Sep 2017 23:45) (95% - 100%) TONY drain 65ml   Chest : fair BS bilaterally  CVS : RRR no murmurs  Abd: RUQ dressing Clean, Dry Intact + TONY with serosanguinous drainage. + BS non tender all quadrants  PVS: warm , no edema + IPC bilat.  Neuro: Awake, alert no gross deficits

## 2017-09-19 NOTE — PROGRESS NOTE ADULT - PROBLEM SELECTOR PLAN 1
s/p open joellen on 9/18/17 gb sent or path to r/o gb cancer .    primary team to follow up    heme/onc consulted by surgical team s/p open joellen on 9/18/17 gb sent tr path to r/o gb cancer .    primary team to follow up    heme/onc consulted by surgical team

## 2017-09-19 NOTE — PHYSICAL THERAPY INITIAL EVALUATION ADULT - MODIFIED CLINICAL TEST OF SENSORY INTEGRATION IN BALANCE TEST
Barthel Index: Feeding Score ___10, Bathing Score __5_, Grooming Score _5__, Dressing Score10 ___, Bowels Score __10_, Bladder Score __10_, Toilet Score _10__, Transfers Score __15_, Mobility Score __10_, Stairs Score _10__,     Total Score __95_

## 2017-09-19 NOTE — CONSULT NOTE ADULT - SUBJECTIVE AND OBJECTIVE BOX
REASON FOR CONSULTATION:    INTERVAL HISTORY:  S/P Gall Bladder Resection      Allergies    No Known Allergies    Intolerances        MEDICATIONS  (STANDING):  heparin  Injectable 5000 Unit(s) SubCutaneous every 12 hours  lactated ringers. 1000 milliLiter(s) (100 mL/Hr) IV Continuous <Continuous>  insulin lispro (HumaLOG) corrective regimen sliding scale   SubCutaneous three times a day before meals  insulin lispro (HumaLOG) corrective regimen sliding scale   SubCutaneous at bedtime  dextrose 50% Injectable 12.5 Gram(s) IV Push once  dextrose 50% Injectable 25 Gram(s) IV Push once  dextrose 50% Injectable 25 Gram(s) IV Push once  piperacillin/tazobactam IVPB. 3.375 Gram(s) IV Intermittent every 8 hours  Parenteral Nutrition - Adult 1 Each (63 mL/Hr) TPN Continuous <Continuous>    MEDICATIONS  (PRN):  ondansetron Injectable 4 milliGRAM(s) IV Push every 6 hours PRN Nausea  dextrose Gel 1 Dose(s) Oral once PRN Blood Glucose LESS THAN 70 milliGRAM(s)/deciliter  glucagon  Injectable 1 milliGRAM(s) IntraMuscular once PRN Glucose LESS THAN 70 milligrams/deciliter  morphine  - Injectable 2 milliGRAM(s) IV Push every 4 hours PRN Moderate Pain (4 - 6)      Vital Signs Last 24 Hrs  T(C): 37.4 (19 Sep 2017 11:25), Max: 37.4 (19 Sep 2017 11:25)  T(F): 99.4 (19 Sep 2017 11:25), Max: 99.4 (19 Sep 2017 11:25)  HR: 86 (19 Sep 2017 11:25) (73 - 98)  BP: 136/76 (19 Sep 2017 11:25) (112/62 - 160/91)  BP(mean): --  RR: 18 (19 Sep 2017 11:25) (15 - 20)  SpO2: 97% (19 Sep 2017 11:25) (96% - 100%)    PHYSICAL EXAM:    EYES: EOMI, PERRLA, conjunctiva and sclera clear  CHEST/LUNG: Clear to percussion bilaterally;   HEART: Regular rate and rhythm;   ABDOMEN: Scar of surgery.  LYMPH: No lymphadenopathy noted.        LABS:                        14.4   10.9  )-----------( 187      ( 19 Sep 2017 06:27 )             43.0     09-19    140  |  105  |  24<H>  ----------------------------<  144<H>  4.3   |  25  |  1.46<H>    Ca    8.3<L>      19 Sep 2017 06:27  Phos  3.1     09-18  Mg     2.4     09-18    TPro  6.8  /  Alb  3.0<L>  /  TBili  1.7<H>  /  DBili  x   /  AST  86<H>  /  ALT  95<H>  /  AlkPhos  98  09-19            RADIOLOGY & ADDITIONAL STUDIES:    PATHOLOGY:

## 2017-09-19 NOTE — PROGRESS NOTE ADULT - SUBJECTIVE AND OBJECTIVE BOX
CC: I have belly pain   HPI: 83 y/o male with PMHx of HTN, CAD, Chronic Systolic CHF (EF 25%) s/p PPM, NIDDM, BPM s/p prostatectomy, and HLD that is admitted to surgical service for a 3 day history of intermittent epigastric pain that is found to have gallbladder pathology on CT scan.  Denies fevers, chills, shortness of breath, denies chest pain, headache, nausea, vomiting, or other symptom.  Denies recent weight loss or other constitutional symptom. Patient is seen and examined at bedside in ED this morning.  He is currently without somatic complaint.  ED chart reviewed.    MEDICATIONS  (STANDING):  heparin  Injectable 5000 Unit(s) SubCutaneous every 12 hours  insulin lispro (HumaLOG) corrective regimen sliding scale   SubCutaneous three times a day before meals  insulin lispro (HumaLOG) corrective regimen sliding scale   SubCutaneous at bedtime  dextrose 50% Injectable 12.5 Gram(s) IV Push once  dextrose 50% Injectable 25 Gram(s) IV Push once  dextrose 50% Injectable 25 Gram(s) IV Push once  piperacillin/tazobactam IVPB. 3.375 Gram(s) IV Intermittent every 8 hours  Parenteral Nutrition - Adult 1 Each (63 mL/Hr) TPN Continuous <Continuous>  lactated ringers. 1000 milliLiter(s) (100 mL/Hr) IV Continuous <Continuous>    MEDICATIONS  (PRN):  ondansetron Injectable 4 milliGRAM(s) IV Push every 6 hours PRN Nausea  dextrose Gel 1 Dose(s) Oral once PRN Blood Glucose LESS THAN 70 milliGRAM(s)/deciliter  glucagon  Injectable 1 milliGRAM(s) IntraMuscular once PRN Glucose LESS THAN 70 milligrams/deciliter  morphine  - Injectable 2 milliGRAM(s) IV Push every 4 hours PRN Moderate Pain (4 - 6)    Vital Signs Last 24 Hrs  T(C): 37.2 (19 Sep 2017 14:42), Max: 37.4 (19 Sep 2017 11:25)  T(F): 98.9 (19 Sep 2017 14:42), Max: 99.4 (19 Sep 2017 11:25)  HR: 93 (19 Sep 2017 14:42) (73 - 98)  BP: 134/73 (19 Sep 2017 14:42) (112/62 - 160/91)  BP(mean): --  RR: 18 (19 Sep 2017 14:42) (15 - 20)  SpO2: 98% (19 Sep 2017 14:42) (96% - 100%)PHYSICAL EXAM:    Labs                            14.4   10.9  )-----------( 187      ( 19 Sep 2017 06:27 )             43.0   09-19    140  |  105  |  24<H>  ----------------------------<  144<H>  4.3   |  25  |  1.46<H>    Ca    8.3<L>      19 Sep 2017 06:27  Phos  3.1     09-18  Mg     2.4     09-18    TPro  6.8  /  Alb  3.0<L>  /  TBili  1.7<H>  /  DBili  x   /  AST  86<H>  /  ALT  95<H>  /  AlkPhos  98  09-19    CAPILLARY BLOOD GLUCOSE  123 (19 Sep 2017 11:01)  124 (19 Sep 2017 07:22)  149 (18 Sep 2017 18:00) CC: I have belly pain   HPI: 81 y/o male with PMHx of HTN, CAD, Chronic Systolic CHF (EF 25%) s/p PPM, NIDDM, BPM s/p prostatectomy, and HLD that is admitted to surgical service for a 3 day history of intermittent epigastric pain that is found to have gallbladder pathology on CT scan.  Denies fevers, chills, shortness of breath, denies chest pain, headache, nausea, vomiting, or other symptom.  Denies recent weight loss or other constitutional symptom. Patient is seen and examined at bedside in ED this morning.  He is currently without somatic complaint.  ED chart reviewed.    MEDICATIONS  (STANDING):  heparin  Injectable 5000 Unit(s) SubCutaneous every 12 hours  insulin lispro (HumaLOG) corrective regimen sliding scale   SubCutaneous three times a day before meals  insulin lispro (HumaLOG) corrective regimen sliding scale   SubCutaneous at bedtime  dextrose 50% Injectable 12.5 Gram(s) IV Push once  dextrose 50% Injectable 25 Gram(s) IV Push once  dextrose 50% Injectable 25 Gram(s) IV Push once  piperacillin/tazobactam IVPB. 3.375 Gram(s) IV Intermittent every 8 hours  Parenteral Nutrition - Adult 1 Each (63 mL/Hr) TPN Continuous <Continuous>  lactated ringers. 1000 milliLiter(s) (100 mL/Hr) IV Continuous <Continuous>    MEDICATIONS  (PRN):  ondansetron Injectable 4 milliGRAM(s) IV Push every 6 hours PRN Nausea  dextrose Gel 1 Dose(s) Oral once PRN Blood Glucose LESS THAN 70 milliGRAM(s)/deciliter  glucagon  Injectable 1 milliGRAM(s) IntraMuscular once PRN Glucose LESS THAN 70 milligrams/deciliter  morphine  - Injectable 2 milliGRAM(s) IV Push every 4 hours PRN Moderate Pain (4 - 6)    Vital Signs Last 24 H  T(C): 37.2 (19 Sep 2017 14:42), Max: 37.4 (19 Sep 2017 11:25)  T(F): 98.9 (19 Sep 2017 14:42), Max: 99.4 (19 Sep 2017 11:25)  HR: 93 (19 Sep 2017 14:42) (73 - 98)  BP: 134/73 (19 Sep 2017 14:42) (112/62 - 160/91)  BP(mean): --  RR: 18 (19 Sep 2017 14:42) (15 - 20)  SpO2: 98% (19 Sep 2017 14:42) (96% - 100%)    S: no flatus , no bm  PHYSICAL EXAM:    GENERAL: NAD, in chair NGT in place well-groomed, well-developed  HEAD:  Atraumatic, Normocephalic  EYES: EOMI, PERRLA, conjunctiva and sclera clear  ENMT: No tonsillar erythema, exudates, or enlargement; Moist mucous membranes, Good dentition, No lesions  NECK: Supple, No JVD, Normal thyroid  NERVOUS SYSTEM:  Alert & Oriented X3, Good concentration; Motor Strength 5/5 B/L upper and lower extremities; DTRs 2+ intact and symmetric  CHEST/LUNG: mild decreased bs at right base, no rales rhonchi or wheezing .   HEART: Regular rate and rhythm; No murmurs, rubs, or gallops  ABDOMEN: Soft, Nontender mildly distended , ruq TONY drain serosanguinous fluid . hypoactive bs   EXTREMITIES:  2+ Peripheral Pulses, No clubbing, cyanosis, or edema  SKIN: No rashes or lesions  Labs                            14.4   10.9  )-----------( 187      ( 19 Sep 2017 06:27 )             43.0   09-19    140  |  105  |  24<H>  ----------------------------<  144<H>  4.3   |  25  |  1.46<H>    Ca    8.3<L>      19 Sep 2017 06:27  Phos  3.1     09-18  Mg     2.4     09-18    TPro  6.8  /  Alb  3.0<L>  /  TBili  1.7<H>  /  DBili  x   /  AST  86<H>  /  ALT  95<H>  /  AlkPhos  98  09-19    CAPILLARY BLOOD GLUCOSE  123 (19 Sep 2017 11:01)  124 (19 Sep 2017 07:22)  149 (18 Sep 2017 18:00)

## 2017-09-19 NOTE — PROGRESS NOTE ADULT - SUBJECTIVE AND OBJECTIVE BOX
Assessment:  Acute Ruthy, post cholecystectomy  H/O CAD  Last cath 2015 at that time medical therapy was recommended  Echo 2015 EF 25-30% is chronic and well managed with medical therapy  Troponin negative  CV risk is acceptable for surgical intervention,   Chronic systolic CHF is noted from chart with EF 25%, AICD already in place

## 2017-09-19 NOTE — PROGRESS NOTE ADULT - SUBJECTIVE AND OBJECTIVE BOX
Gastroenterology  Patient seen and examined bedside resting comfortably.   complaints. + abdominal pain  Denies nausea and vomiting. NPO  Normal flatus/BM.     T(F): 98.9 (09-19-17 @ 14:42), Max: 99.4 (09-19-17 @ 11:25)  HR: 89 (09-19-17 @ 15:36) (73 - 98)  BP: 142/94 (09-19-17 @ 15:36) (112/62 - 160/91)  RR: 18 (09-19-17 @ 15:36) (16 - 20)  SpO2: 98% (09-19-17 @ 15:36) (96% - 100%)  Wt(kg): --  CAPILLARY BLOOD GLUCOSE  123 (19 Sep 2017 11:01)  124 (19 Sep 2017 07:22)  149 (18 Sep 2017 18:00)          PHYSICAL EXAM:  General: NAD, WDWN.   Neuro:  Alert & oriented x 3  HEENT: NCAT, EOMI, conjunctiva clear  CV: +S1+S2 regular rate and rhythm  Lung: clear to ausculation bilaterally, respirations nonlabored, good inspiratory effort  Abdomen: soft, +Tender, No distention Normal active BS  Extremities: no cyanosis, clubbing or edema    LABS:                        14.4   10.9  )-----------( 187      ( 19 Sep 2017 06:27 )             43.0     09-19    140  |  105  |  24<H>  ----------------------------<  144<H>  4.3   |  25  |  1.46<H>    Ca    8.3<L>      19 Sep 2017 06:27  Phos  3.1     09-18  Mg     2.4     09-18    TPro  6.8  /  Alb  3.0<L>  /  TBili  1.7<H>  /  DBili  x   /  AST  86<H>  /  ALT  95<H>  /  AlkPhos  98  09-19    LIVER FUNCTIONS - ( 19 Sep 2017 06:27 )  Alb: 3.0 g/dL / Pro: 6.8 gm/dL / ALK PHOS: 98 U/L / ALT: 95 U/L / AST: 86 U/L / GGT: x             I&O's Detail    18 Sep 2017 07:01  -  19 Sep 2017 07:00  --------------------------------------------------------  IN:    dextrose 5% + sodium chloride 0.45%.: 120 mL    lactated ringers.: 80 mL    lactated ringers.: 1200 mL    Sodium Chloride 0.9% IV Bolus: 500 mL    Solution: 300 mL  Total IN: 2200 mL    OUT:    Bulb: 175 mL    Nasoenteral Tube: 50 mL    Voided: 800 mL  Total OUT: 1025 mL    Total NET: 1175 mL      19 Sep 2017 07:01  -  19 Sep 2017 15:53  --------------------------------------------------------  IN:    Solution: 100 mL  Total IN: 100 mL    OUT:    Bulb: 45 mL    Voided: 500 mL  Total OUT: 545 mL    Total NET: -445 mL        09-19 @ 06:27    140 | 105 | 24  /8.3 | -- | --  _______________________/  4.3 | 25 | 1.46                           \par

## 2017-09-19 NOTE — PROGRESS NOTE ADULT - ASSESSMENT
83 y/o male with multiple comorbidities that is admitted for acute cholecystitis r/o gallbladder neoplastic process    Admitted to surgical service

## 2017-09-19 NOTE — PROGRESS NOTE ADULT - ASSESSMENT
83 y/o male s/p Open cholecystectomy Post op stable  Continue NPO, NGT to low wall suction.  IV fluids LR at 100ml- monitor urine output. Pt failed to void - has hx of Prostate CA with seeding . Bladder scan with 239ml  Additional NS 500ml bolus given will monitor urine output- Dent insertion if not voiding within 2-3 hrs  Continue on Zosyn IV  Encourage Incentive spirometry  GI prophylaxis with Pepcid IV  DVT prophylaxis with IPC's now, Heparin SC ordered post op  F/u Cardiology - Dr. Watson for frequent PVC's - Check electrolytes and replace.  Case to be discussed with Dr. Sheffield and surg team.

## 2017-09-19 NOTE — PHYSICAL THERAPY INITIAL EVALUATION ADULT - CRITERIA FOR SKILLED THERAPEUTIC INTERVENTIONS
therapy frequency/anticipated equipment needs at discharge/Home with outpatient PT and rolling walker./impairments found/predicted duration of therapy intervention/functional limitations in following categories/risk reduction/prevention

## 2017-09-20 LAB
ALBUMIN SERPL ELPH-MCNC: 3 G/DL — LOW (ref 3.3–5)
ALP SERPL-CCNC: 86 U/L — SIGNIFICANT CHANGE UP (ref 40–120)
ALT FLD-CCNC: 59 U/L — SIGNIFICANT CHANGE UP (ref 12–78)
ANION GAP SERPL CALC-SCNC: 10 MMOL/L — SIGNIFICANT CHANGE UP (ref 5–17)
ANION GAP SERPL CALC-SCNC: 11 MMOL/L — SIGNIFICANT CHANGE UP (ref 5–17)
AST SERPL-CCNC: 46 U/L — HIGH (ref 15–37)
BILIRUB DIRECT SERPL-MCNC: 0.41 MG/DL — HIGH (ref 0.05–0.2)
BILIRUB INDIRECT FLD-MCNC: 1.1 MG/DL — HIGH (ref 0.2–1)
BILIRUB SERPL-MCNC: 1.5 MG/DL — HIGH (ref 0.2–1.2)
BUN SERPL-MCNC: 31 MG/DL — HIGH (ref 7–23)
BUN SERPL-MCNC: 40 MG/DL — HIGH (ref 7–23)
CALCIUM SERPL-MCNC: 8.3 MG/DL — LOW (ref 8.5–10.1)
CALCIUM SERPL-MCNC: 8.5 MG/DL — SIGNIFICANT CHANGE UP (ref 8.5–10.1)
CHLORIDE SERPL-SCNC: 103 MMOL/L — SIGNIFICANT CHANGE UP (ref 96–108)
CHLORIDE SERPL-SCNC: 103 MMOL/L — SIGNIFICANT CHANGE UP (ref 96–108)
CO2 SERPL-SCNC: 22 MMOL/L — SIGNIFICANT CHANGE UP (ref 22–31)
CO2 SERPL-SCNC: 24 MMOL/L — SIGNIFICANT CHANGE UP (ref 22–31)
CREAT SERPL-MCNC: 1.43 MG/DL — HIGH (ref 0.5–1.3)
CREAT SERPL-MCNC: 1.72 MG/DL — HIGH (ref 0.5–1.3)
GLUCOSE SERPL-MCNC: 126 MG/DL — HIGH (ref 70–99)
GLUCOSE SERPL-MCNC: 136 MG/DL — HIGH (ref 70–99)
HCT VFR BLD CALC: 38.6 % — LOW (ref 39–50)
HCT VFR BLD CALC: 41.6 % — SIGNIFICANT CHANGE UP (ref 39–50)
HGB BLD-MCNC: 12.9 G/DL — LOW (ref 13–17)
HGB BLD-MCNC: 13.4 G/DL — SIGNIFICANT CHANGE UP (ref 13–17)
MAGNESIUM SERPL-MCNC: 2.4 MG/DL — SIGNIFICANT CHANGE UP (ref 1.6–2.6)
MAGNESIUM SERPL-MCNC: 2.7 MG/DL — HIGH (ref 1.6–2.6)
MCHC RBC-ENTMCNC: 31.7 PG — SIGNIFICANT CHANGE UP (ref 27–34)
MCHC RBC-ENTMCNC: 31.8 PG — SIGNIFICANT CHANGE UP (ref 27–34)
MCHC RBC-ENTMCNC: 32.3 GM/DL — SIGNIFICANT CHANGE UP (ref 32–36)
MCHC RBC-ENTMCNC: 33.4 GM/DL — SIGNIFICANT CHANGE UP (ref 32–36)
MCV RBC AUTO: 95.1 FL — SIGNIFICANT CHANGE UP (ref 80–100)
MCV RBC AUTO: 98.1 FL — SIGNIFICANT CHANGE UP (ref 80–100)
PHOSPHATE SERPL-MCNC: 1.6 MG/DL — LOW (ref 2.5–4.5)
PHOSPHATE SERPL-MCNC: 2.4 MG/DL — LOW (ref 2.5–4.5)
PLATELET # BLD AUTO: 159 K/UL — SIGNIFICANT CHANGE UP (ref 150–400)
PLATELET # BLD AUTO: 187 K/UL — SIGNIFICANT CHANGE UP (ref 150–400)
POTASSIUM SERPL-MCNC: 3.5 MMOL/L — SIGNIFICANT CHANGE UP (ref 3.5–5.3)
POTASSIUM SERPL-MCNC: 4.2 MMOL/L — SIGNIFICANT CHANGE UP (ref 3.5–5.3)
POTASSIUM SERPL-SCNC: 3.5 MMOL/L — SIGNIFICANT CHANGE UP (ref 3.5–5.3)
POTASSIUM SERPL-SCNC: 4.2 MMOL/L — SIGNIFICANT CHANGE UP (ref 3.5–5.3)
PROT SERPL-MCNC: 7.1 GM/DL — SIGNIFICANT CHANGE UP (ref 6–8.3)
RBC # BLD: 4.06 M/UL — LOW (ref 4.2–5.8)
RBC # BLD: 4.24 M/UL — SIGNIFICANT CHANGE UP (ref 4.2–5.8)
RBC # FLD: 12.8 % — SIGNIFICANT CHANGE UP (ref 11–15)
RBC # FLD: 13.2 % — SIGNIFICANT CHANGE UP (ref 11–15)
SODIUM SERPL-SCNC: 135 MMOL/L — SIGNIFICANT CHANGE UP (ref 135–145)
SODIUM SERPL-SCNC: 138 MMOL/L — SIGNIFICANT CHANGE UP (ref 135–145)
SURGICAL PATHOLOGY FINAL REPORT - CH: SIGNIFICANT CHANGE UP
WBC # BLD: 11.8 K/UL — HIGH (ref 3.8–10.5)
WBC # BLD: 12.5 K/UL — HIGH (ref 3.8–10.5)
WBC # FLD AUTO: 11.8 K/UL — HIGH (ref 3.8–10.5)
WBC # FLD AUTO: 12.5 K/UL — HIGH (ref 3.8–10.5)

## 2017-09-20 PROCEDURE — 71010: CPT | Mod: 26

## 2017-09-20 RX ORDER — CARVEDILOL PHOSPHATE 80 MG/1
12.5 CAPSULE, EXTENDED RELEASE ORAL EVERY 12 HOURS
Qty: 0 | Refills: 0 | Status: DISCONTINUED | OUTPATIENT
Start: 2017-09-20 | End: 2017-09-23

## 2017-09-20 RX ORDER — SODIUM CHLORIDE 9 MG/ML
1000 INJECTION, SOLUTION INTRAVENOUS
Qty: 0 | Refills: 0 | Status: DISCONTINUED | OUTPATIENT
Start: 2017-09-20 | End: 2017-09-20

## 2017-09-20 RX ORDER — ELECTROLYTE SOLUTION,INJ
1 VIAL (ML) INTRAVENOUS
Qty: 0 | Refills: 0 | Status: DISCONTINUED | OUTPATIENT
Start: 2017-09-20 | End: 2017-09-20

## 2017-09-20 RX ORDER — METOPROLOL TARTRATE 50 MG
5 TABLET ORAL ONCE
Qty: 0 | Refills: 0 | Status: COMPLETED | OUTPATIENT
Start: 2017-09-20 | End: 2017-09-20

## 2017-09-20 RX ORDER — I.V. FAT EMULSION 20 G/100ML
250 EMULSION INTRAVENOUS ONCE
Qty: 0 | Refills: 0 | Status: COMPLETED | OUTPATIENT
Start: 2017-09-20 | End: 2017-09-20

## 2017-09-20 RX ORDER — POTASSIUM PHOSPHATE, MONOBASIC POTASSIUM PHOSPHATE, DIBASIC 236; 224 MG/ML; MG/ML
15 INJECTION, SOLUTION INTRAVENOUS ONCE
Qty: 0 | Refills: 0 | Status: COMPLETED | OUTPATIENT
Start: 2017-09-20 | End: 2017-09-20

## 2017-09-20 RX ADMIN — I.V. FAT EMULSION 10.42 MILLILITER(S): 20 EMULSION INTRAVENOUS at 15:17

## 2017-09-20 RX ADMIN — Medication 5 MILLIGRAM(S): at 20:03

## 2017-09-20 RX ADMIN — HYDROMORPHONE HYDROCHLORIDE 1 MILLIGRAM(S): 2 INJECTION INTRAMUSCULAR; INTRAVENOUS; SUBCUTANEOUS at 11:45

## 2017-09-20 RX ADMIN — CARVEDILOL PHOSPHATE 12.5 MILLIGRAM(S): 80 CAPSULE, EXTENDED RELEASE ORAL at 18:18

## 2017-09-20 RX ADMIN — HYDROMORPHONE HYDROCHLORIDE 1 MILLIGRAM(S): 2 INJECTION INTRAMUSCULAR; INTRAVENOUS; SUBCUTANEOUS at 22:22

## 2017-09-20 RX ADMIN — MORPHINE SULFATE 4 MILLIGRAM(S): 50 CAPSULE, EXTENDED RELEASE ORAL at 18:29

## 2017-09-20 RX ADMIN — POTASSIUM PHOSPHATE, MONOBASIC POTASSIUM PHOSPHATE, DIBASIC 63.75 MILLIMOLE(S): 236; 224 INJECTION, SOLUTION INTRAVENOUS at 22:26

## 2017-09-20 RX ADMIN — HEPARIN SODIUM 5000 UNIT(S): 5000 INJECTION INTRAVENOUS; SUBCUTANEOUS at 18:18

## 2017-09-20 RX ADMIN — PIPERACILLIN AND TAZOBACTAM 25 GRAM(S): 4; .5 INJECTION, POWDER, LYOPHILIZED, FOR SOLUTION INTRAVENOUS at 02:58

## 2017-09-20 RX ADMIN — HYDROMORPHONE HYDROCHLORIDE 1 MILLIGRAM(S): 2 INJECTION INTRAMUSCULAR; INTRAVENOUS; SUBCUTANEOUS at 11:31

## 2017-09-20 RX ADMIN — MORPHINE SULFATE 4 MILLIGRAM(S): 50 CAPSULE, EXTENDED RELEASE ORAL at 02:58

## 2017-09-20 RX ADMIN — MORPHINE SULFATE 4 MILLIGRAM(S): 50 CAPSULE, EXTENDED RELEASE ORAL at 18:45

## 2017-09-20 RX ADMIN — Medication 1 EACH: at 22:07

## 2017-09-20 RX ADMIN — HYDROMORPHONE HYDROCHLORIDE 1 MILLIGRAM(S): 2 INJECTION INTRAMUSCULAR; INTRAVENOUS; SUBCUTANEOUS at 22:07

## 2017-09-20 RX ADMIN — MORPHINE SULFATE 4 MILLIGRAM(S): 50 CAPSULE, EXTENDED RELEASE ORAL at 03:13

## 2017-09-20 RX ADMIN — HEPARIN SODIUM 5000 UNIT(S): 5000 INJECTION INTRAVENOUS; SUBCUTANEOUS at 04:52

## 2017-09-20 NOTE — CONSULT NOTE ADULT - SUBJECTIVE AND OBJECTIVE BOX
HPI:  83 y/o male PMHx AICD/pacemaker, HTN, HLD, CAD/MI, DM, EF of 25%, acid reflux, BPH with intermittent epigastric discomfort with food intake lasting for 1-2 hours per day over past three days. Slight nausea earlier yesterday. Denies abdominal pain or discomfort now. Last BM was yesterday and normal. Patient last ate at 4pm. No hx colonoscopy/endoscopy. Patient denies fever, chills, nausea, vomiting, constipation, diarrhea, hematuria, melena, hematochezia, chest pain, shortness of breath, dizziness, palpitations. Patient denies prior incident. (14 Sep 2017 03:20)  patienet had open cholecystectomy on19nt. has no dyspnea or fever. Has upper abdominal pain. paient is on PPN and is NPo except for meds      PAST MEDICAL & SURGICAL HISTORY:  Pneumonia: hospitliazed two years ago on IV antibiotics for 10 days  CAD (coronary artery disease): last cath done in 1991- (no intervention, locations of lesions unknown)  Myocardial infarct: in 1990, 1991  BPH (Benign Prostatic Hyperplasia)  Diabetes mellitus type 2, diet-controlled: diagnosed two years, diet controlled  Dyslipidemia  Hypertension  H/O prostate biopsy: During prostate biposy, a vessel was hit and wouldn&#x27;t stop bleeding so patient had emergency prostate surgery. Exact procedure unknown by patient.  Skin lesion of right leg: with biopsy - benign      REVIEW OF SYSTEMS:    CONSTITUTIONAL: No fever, weight loss, or fatigue  EYES: No eye pain, visual disturbances, or discharge  ENMT:  No difficulty hearing, tinnitus, vertigo; No sinus or throat pain  NECK: No pain or stiffness  BREASTS: No pain, masses, or nipple discharge  RESPIRATORY: No cough, wheezing, chills or hemoptysis; No shortness of breath  CARDIOVASCULAR: No chest pain, palpitations, dizziness, or leg swelling  GASTROINTESTINAL: No abdominal or epigastric pain. No nausea, vomiting, or hematemesis; No diarrhea or constipation. No melena or hematochezia.  GENITOURINARY: No dysuria, frequency, hematuria, or incontinence  NEUROLOGICAL: No headaches, memory loss, loss of strength, numbness, or tremors  SKIN: No itching, burning, rashes, or lesions   LYMPH NODES: No enlarged glands  ENDOCRINE: No heat or cold intolerance; No hair loss  MUSCULOSKELETAL: No joint pain or swelling; No muscle, back, or extremity pain  PSYCHIATRIC: No depression, anxiety, mood swings, or difficulty sleeping  HEME/LYMPH: No easy bruising, or bleeding gums  ALLERY AND IMMUNOLOGIC: No hives or eczema      MEDICATIONS  (STANDING):  heparin  Injectable 5000 Unit(s) SubCutaneous every 12 hours  insulin lispro (HumaLOG) corrective regimen sliding scale   SubCutaneous three times a day before meals  insulin lispro (HumaLOG) corrective regimen sliding scale   SubCutaneous at bedtime  dextrose 50% Injectable 12.5 Gram(s) IV Push once  dextrose 50% Injectable 25 Gram(s) IV Push once  dextrose 50% Injectable 25 Gram(s) IV Push once  Parenteral Nutrition - Adult 1 Each (63 mL/Hr) TPN Continuous <Continuous>  carvedilol 12.5 milliGRAM(s) Oral every 12 hours  Parenteral Nutrition - Adult 1 Each (83 mL/Hr) TPN Continuous <Continuous>  fat emulsion 20% IVPB 250 milliLiter(s) IV Intermittent once    MEDICATIONS  (PRN):  ondansetron Injectable 4 milliGRAM(s) IV Push every 6 hours PRN Nausea  dextrose Gel 1 Dose(s) Oral once PRN Blood Glucose LESS THAN 70 milliGRAM(s)/deciliter  glucagon  Injectable 1 milliGRAM(s) IntraMuscular once PRN Glucose LESS THAN 70 milligrams/deciliter  HYDROmorphone  Injectable 1 milliGRAM(s) IV Push every 6 hours PRN Severe Pain (7 - 10)  morphine  - Injectable 4 milliGRAM(s) IV Push every 6 hours PRN Moderate Pain (4 - 6)      Allergies    No Known Allergies    Intolerances        SOCIAL HISTORY:    FAMILY HISTORY:  No pertinent family history in first degree relatives      Vital Signs Last 24 Hrs  T(C): 37.1 (20 Sep 2017 11:03), Max: 37.3 (19 Sep 2017 17:06)  T(F): 98.7 (20 Sep 2017 11:03), Max: 99.2 (19 Sep 2017 17:06)  HR: 69 (20 Sep 2017 11:03) (64 - 93)  BP: 110/73 (20 Sep 2017 11:03) (110/73 - 142/94)  BP(mean): --  RR: 15 (20 Sep 2017 11:03) (15 - 18)  SpO2: 98% (20 Sep 2017 11:03) (95% - 98%)    PHYSICAL EXAM:    GENERAL: NAD, well-groomed, well-developed  HEAD:  Atraumatic, Normocephalic  EYES: EOMI, PERRLA, conjunctiva and sclera clear  ENMT: No tonsillar erythema, exudates, or enlargement; Moist mucous membranes, Good dentition, No lesions  NECK: Supple, No JVD, Normal thyroid  NERVOUS SYSTEM:  Alert & Oriented X3, Good concentration; Motor Strength 5/5 B/L upper and lower extremities; DTRs 2+ intact and symmetric  CHEST/LUNG: Clear to percussion bilaterally; No rales, rhonchi, wheezing, or rubs  HEART: Regular rate and rhythm; No murmurs, rubs, or gallops  ABDOMEN: Soft, Nontender, Nondistended; Bowel sounds present  EXTREMITIES:  2+ Peripheral Pulses, No clubbing, cyanosis, or edema  LYMPH: No lymphadenopathy notedRECTAL: No masses, prostate normal size and smooth, Guiac negative   BREAST: No palpatble masses, skin no lesions no retractions, no discharages. adenexa no palpable masses noted   GYN: uterus normal size, adenexa no palpable masses, no CMT, no uterine discharge   SKIN: No rashes or lesions      LABS:                        13.4   12.5  )-----------( 187      ( 20 Sep 2017 06:58 )             41.6     09-20    138  |  103  |  31<H>  ----------------------------<  136<H>  4.2   |  24  |  1.72<H>    Ca    8.5      20 Sep 2017 06:58  Phos  2.4     09-20  Mg     2.7     09-20    TPro  7.1  /  Alb  3.0<L>  /  TBili  1.5<H>  /  DBili  .41<H>  /  AST  46<H>  /  ALT  59  /  AlkPhos  86  09-20            RADIOLOGY & ADDITIONAL STUDIES:

## 2017-09-20 NOTE — CONSULT NOTE ADULT - PROBLEM SELECTOR RECOMMENDATION 4
diet controlled , Monitor Blood sugar, HB A1C
hold all oral hypglycemics  HISS  accuchecks q6 hours while npo
hold all oral hypglycemics  HISS  accuchecks q6 hours while npo

## 2017-09-20 NOTE — PROGRESS NOTE ADULT - SUBJECTIVE AND OBJECTIVE BOX
Gastroenterolgy  Patient seen and examined bedside resting comfortably.  No complaints offered. Incisional abdominal pain  Denies nausea and vomiting. Has NGT  No flatus/BM.     T(F): 98.7 (09-20-17 @ 11:03), Max: 99.2 (09-19-17 @ 17:06)  HR: 69 (09-20-17 @ 11:03) (64 - 93)  BP: 110/73 (09-20-17 @ 11:03) (110/73 - 124/76)  RR: 15 (09-20-17 @ 11:03) (15 - 18)  SpO2: 98% (09-20-17 @ 11:03) (95% - 98%)  Wt(kg): --  CAPILLARY BLOOD GLUCOSE  122 (20 Sep 2017 12:19)  132 (20 Sep 2017 05:12)  130 (19 Sep 2017 23:39)    PHYSICAL EXAM:  General: NAD, WDWN.   Neuro:  Alert & oriented x 3  HEENT: NCAT, EOMI, conjunctiva clear  CV: +S1+S2 regular rate and rhythm  Lung: clear to ausculation bilaterally, respirations nonlabored, good inspiratory effort  Abdomen: soft, incisional Tenderness, No distention Normal active BS  Extremities: no cyanosis, clubbing or edema    LABS:                        13.4   12.5  )-----------( 187      ( 20 Sep 2017 06:58 )             41.6     09-20    138  |  103  |  31<H>  ----------------------------<  136<H>  4.2   |  24  |  1.72<H>    Ca    8.5      20 Sep 2017 06:58  Phos  2.4     09-20  Mg     2.7     09-20    TPro  7.1  /  Alb  3.0<L>  /  TBili  1.5<H>  /  DBili  .41<H>  /  AST  46<H>  /  ALT  59  /  AlkPhos  86  09-20    LIVER FUNCTIONS - ( 20 Sep 2017 06:58 )  Alb: 3.0 g/dL / Pro: 7.1 gm/dL / ALK PHOS: 86 U/L / ALT: 59 U/L / AST: 46 U/L / GGT: x             I&O's Detail    19 Sep 2017 07:01  -  20 Sep 2017 07:00  --------------------------------------------------------  IN:    lactated ringers.: 1500 mL    Oral Fluid: 480 mL    PPN (Peripheral Parenteral Nutrition): 441 mL    Solution: 300 mL  Total IN: 2721 mL    OUT:    Bulb: 145 mL    Nasoenteral Tube: 200 mL    Voided: 1150 mL  Total OUT: 1495 mL    Total NET: 1226 mL        09-20 @ 06:58    138 | 103 | 31  /8.5 | 2.7 | 2.4  _______________________/  4.2 | 24 | 1.72                           \par

## 2017-09-20 NOTE — PROGRESS NOTE ADULT - SUBJECTIVE AND OBJECTIVE BOX
INTERVAL History:  Cholecystectomy.    Allergies    No Known Allergies    Intolerances        MEDICATIONS  (STANDING):  heparin  Injectable 5000 Unit(s) SubCutaneous every 12 hours  insulin lispro (HumaLOG) corrective regimen sliding scale   SubCutaneous three times a day before meals  insulin lispro (HumaLOG) corrective regimen sliding scale   SubCutaneous at bedtime  dextrose 50% Injectable 12.5 Gram(s) IV Push once  dextrose 50% Injectable 25 Gram(s) IV Push once  dextrose 50% Injectable 25 Gram(s) IV Push once  piperacillin/tazobactam IVPB. 3.375 Gram(s) IV Intermittent every 8 hours  Parenteral Nutrition - Adult 1 Each (63 mL/Hr) TPN Continuous <Continuous>  lactated ringers. 1000 milliLiter(s) (75 mL/Hr) IV Continuous <Continuous>  carvedilol 12.5 milliGRAM(s) Oral every 12 hours    MEDICATIONS  (PRN):  ondansetron Injectable 4 milliGRAM(s) IV Push every 6 hours PRN Nausea  dextrose Gel 1 Dose(s) Oral once PRN Blood Glucose LESS THAN 70 milliGRAM(s)/deciliter  glucagon  Injectable 1 milliGRAM(s) IntraMuscular once PRN Glucose LESS THAN 70 milligrams/deciliter  HYDROmorphone  Injectable 1 milliGRAM(s) IV Push every 6 hours PRN Severe Pain (7 - 10)  morphine  - Injectable 4 milliGRAM(s) IV Push every 6 hours PRN Moderate Pain (4 - 6)      Vital Signs Last 24 Hrs  T(C): 37 (20 Sep 2017 05:40), Max: 37.4 (19 Sep 2017 11:25)  T(F): 98.6 (20 Sep 2017 05:40), Max: 99.4 (19 Sep 2017 11:25)  HR: 64 (20 Sep 2017 05:40) (64 - 93)  BP: 116/66 (20 Sep 2017 05:40) (112/68 - 142/94)  BP(mean): --  RR: 16 (20 Sep 2017 05:40) (16 - 18)  SpO2: 96% (20 Sep 2017 05:40) (95% - 98%)    PHYSICAL EXAM:      EYES: EOMI, PERRLA, conjunctiva and sclera clear  NECK: Supple, No JVD, Normal thyroid  CHEST/LUNG: Clear to percussion bilaterally; No rales, rhonchi,   HEART: Regular rate and rhythm;   ABDOMEN: Scar of Surgery.  LYMPH: No lymphadenopathy noted        LABS:                        13.4   12.5  )-----------( 187      ( 20 Sep 2017 06:58 )             41.6     09-20    138  |  103  |  31<H>  ----------------------------<  136<H>  4.2   |  24  |  1.72<H>    Ca    8.5      20 Sep 2017 06:58  Phos  2.4     09-20  Mg     2.7     09-20    TPro  7.1  /  Alb  3.0<L>  /  TBili  1.5<H>  /  DBili  .41<H>  /  AST  46<H>  /  ALT  59  /  AlkPhos  86  09-20            RADIOLOGY & ADDITIONAL STUDIES:    PATHOLOGY:

## 2017-09-20 NOTE — CONSULT NOTE ADULT - PROBLEM SELECTOR PROBLEM 2
Chronic systolic heart failure
Chronic systolic heart failure
Coronary artery disease involving native coronary artery of native heart without angina pectoris

## 2017-09-20 NOTE — PROGRESS NOTE ADULT - SUBJECTIVE AND OBJECTIVE BOX
SURGERY PROGRESS HPI:  Pt seen and examined at bedside using incentive spirometer. Pain is well controlled on pain medication. Pt denies complaints. Pt tolerating NGT. Pt denies nausea and vomiting. No BM/flatus. Voiding well. Pt denies chest pain, SOB, dizziness, fever, chills. Ambulating down the hallways.    Vital Signs Last 24 Hrs  T(C): 36.8 (19 Sep 2017 23:20), Max: 37.4 (19 Sep 2017 11:25)  T(F): 98.2 (19 Sep 2017 23:20), Max: 99.4 (19 Sep 2017 11:25)  HR: 88 (19 Sep 2017 23:20) (85 - 93)  BP: 112/68 (19 Sep 2017 23:20) (112/68 - 142/94)  BP(mean): --  RR: 17 (19 Sep 2017 23:20) (17 - 19)  SpO2: 95% (19 Sep 2017 23:20) (95% - 98%)      PHYSICAL EXAM:    GENERAL: NAD  HEAD:  NGT bilious output 200cc/24hrs  CHEST/LUNG: Clear to ausculation, bilaterally   HEART: RRR S1S2  ABDOMEN: Dressing clean/dry/intact. Drain with serosang output 145cc/24hrs. non distended, +BS, soft, non tender, no guarding  EXTREMITIES:  calf soft, non tender b/l    I&O's Detail    18 Sep 2017 07:01  -  19 Sep 2017 07:00  --------------------------------------------------------  IN:    dextrose 5% + sodium chloride 0.45%.: 120 mL    lactated ringers.: 80 mL    lactated ringers.: 1200 mL    Sodium Chloride 0.9% IV Bolus: 500 mL    Solution: 300 mL  Total IN: 2200 mL    OUT:    Bulb: 175 mL    Nasoenteral Tube: 50 mL    Voided: 800 mL  Total OUT: 1025 mL    Total NET: 1175 mL      19 Sep 2017 07:01  -  20 Sep 2017 06:26  --------------------------------------------------------  IN:    lactated ringers.: 1500 mL    Oral Fluid: 480 mL    PPN (Peripheral Parenteral Nutrition): 441 mL    Solution: 300 mL  Total IN: 2721 mL    OUT:    Bulb: 145 mL    Nasoenteral Tube: 200 mL    Voided: 1150 mL  Total OUT: 1495 mL    Total NET: 1226 mL          LABS:                        14.4   10.9  )-----------( 187      ( 19 Sep 2017 06:27 )             43.0     09-19    140  |  105  |  24<H>  ----------------------------<  144<H>  4.3   |  25  |  1.46<H>    Ca    8.3<L>      19 Sep 2017 06:27  Phos  3.1     09-18  Mg     2.4     09-18    TPro  6.8  /  Alb  3.0<L>  /  TBili  1.7<H>  /  DBili  x   /  AST  86<H>  /  ALT  95<H>  /  AlkPhos  98  09-19      Assessment: 83 y/o male PMHx AICD/pacemaker, HTN, HLD, CAD/MI, acid reflux, BPH s/p lap converted to open joellen POD#2    Plan:  -NGT to LWS, NPO, IVF  -PPN per GI today  -continue TONY drain care and monitor output  -pain management prn  -continue DVT prophylaxis, OOB, Ambulating  -continue incentive spirometer  -continue local wound care  -continue antibiotics   -f/u pathology  -f/u labs  -will discuss pt with Dr. Sheffield SURGERY PROGRESS HPI:  Pt seen and examined at bedside using incentive spirometer. Pain is well controlled on pain medication. Pt denies complaints. Pt tolerating NGT. Pt denies nausea and vomiting. No BM/flatus. Voiding well. Pt denies chest pain, SOB, dizziness, fever, chills. Ambulating down the hallways.    Vital Signs Last 24 Hrs  T(C): 36.8 (19 Sep 2017 23:20), Max: 37.4 (19 Sep 2017 11:25)  T(F): 98.2 (19 Sep 2017 23:20), Max: 99.4 (19 Sep 2017 11:25)  HR: 88 (19 Sep 2017 23:20) (85 - 93)  BP: 112/68 (19 Sep 2017 23:20) (112/68 - 142/94)  BP(mean): --  RR: 17 (19 Sep 2017 23:20) (17 - 19)  SpO2: 95% (19 Sep 2017 23:20) (95% - 98%)      PHYSICAL EXAM:    GENERAL: NAD  HEAD:  NGT bilious output 200cc/24hrs  CHEST/LUNG: Clear to ausculation, bilaterally   HEART: RRR S1S2  ABDOMEN: Dressing clean/dry/intact. Drain with serosang output 145cc/24hrs. non distended, +BS, soft, non tender, no guarding  EXTREMITIES:  calf soft, non tender b/l    I&O's Detail    18 Sep 2017 07:01  -  19 Sep 2017 07:00  --------------------------------------------------------  IN:    dextrose 5% + sodium chloride 0.45%.: 120 mL    lactated ringers.: 80 mL    lactated ringers.: 1200 mL    Sodium Chloride 0.9% IV Bolus: 500 mL    Solution: 300 mL  Total IN: 2200 mL    OUT:    Bulb: 175 mL, serous sanguinous     Nasoenteral Tube: 50 mL    Voided: 800 mL  Total OUT: 1025 mL    Total NET: 1175 mL      19 Sep 2017 07:01  -  20 Sep 2017 06:26  --------------------------------------------------------  IN:    lactated ringers.: 1500 mL    Oral Fluid: 480 mL    PPN (Peripheral Parenteral Nutrition): 441 mL    Solution: 300 mL  Total IN: 2721 mL    OUT:    Bulb: 145 mL    Nasoenteral Tube: 200 mL    Voided: 1150 mL  Total OUT: 1495 mL    Total NET: 1226 mL          LABS:                        14.4   10.9  )-----------( 187      ( 19 Sep 2017 06:27 )             43.0     09-19    140  |  105  |  24<H>  ----------------------------<  144<H>  4.3   |  25  |  1.46<H>    Ca    8.3<L>      19 Sep 2017 06:27  Phos  3.1     09-18  Mg     2.4     09-18    TPro  6.8  /  Alb  3.0<L>  /  TBili  1.7<H>  /  DBili  x   /  AST  86<H>  /  ALT  95<H>  /  AlkPhos  98  09-19      Assessment: 83 y/o male PMHx AICD/pacemaker, HTN, HLD, CAD/MI, acid reflux, BPH s/p lap converted to open joellen POD#2.  Stable.    Plan:  -NGT to LWS, NPO, IVF  -PPN per GI today  -continue TONY drain care and monitor output  -pain management prn  -continue DVT prophylaxis, OOB, Ambulating  -continue incentive spirometer  -continue local wound care  -continue antibiotics,  -Cardiology Follow up.   -f/u pathology  -f/u labs  -will discuss pt with Dr. Sheffield

## 2017-09-20 NOTE — PROGRESS NOTE ADULT - SUBJECTIVE AND OBJECTIVE BOX
Assessment:    H/O CAD  Last cath 2015 at that time medical therapy was recommended  Echo 2015 EF 25-30% is chronic and well managed with medical therapy  Troponin negative  Chronic systolic CHF is noted from chart with EF 25%, AICD already in place  Acute Ruthy, stable post cholecystectomy

## 2017-09-20 NOTE — CONSULT NOTE ADULT - PROBLEM SELECTOR RECOMMENDATION 3
Monitor, start valsartan if BP trends up
maintain on all home medications
maintain on all home medications

## 2017-09-20 NOTE — CONSULT NOTE ADULT - PROBLEM SELECTOR PROBLEM 4
Diabetes mellitus type 2, diet-controlled

## 2017-09-20 NOTE — CHART NOTE - NSCHARTNOTEFT_GEN_A_CORE
RRT note  RN called RRT for "a page of V-tach." Patient seen and examined sitting comfortably in his chair. Patient completely asymtomatic and states this has happened before. Denies chest pain, palpitations, shortness of breath, dizziness. Denies pain and complaints. Tolerating NGT. Denies nausea/vomiting. Cardiac medications were just restarted today. Patient seen by cardiology today.    Vitals: 129/104. HR: 94 RR:22 spO2:92 room air. temp: 99.8F. blood sugar:115  No V-tach on monitor during RRT.  STAT EKG similar to prior EKG on admission. No v-tach.     GENERAL: NAD  HEAD:  NGT   CHEST/LUNG: Clear to ausculation, bilaterally   HEART: RRR S1S2  ABDOMEN: Dressing clean/dry/intact. Drain with serosang output. non distended, +BS, soft, non tender, no guarding  EXTREMITIES:  calf soft, non tender b/l    Assessment: 83 y/o male PMHx AICD/pacemaker, HTN, HLD, CAD/MI, acid reflux, BPH s/p lap converted to open joellen POD#2. RRT called for V-tach. Patient is stable and asymtomatic.     Plan:  -STAT labs ordered: cbc, cmp, mg, phos  -STAT EKG  -one dose of lopressor 5mg ordered by medicine  -continue to monitor the cardiac monitor and for any symptoms  -patient seen by cardiology today. continue cardiology f/u  -NGT to LWS, NPO, IVF  -continue DVT prophylaxis, OOB, Ambulating, IS, local wound care  -continue antibiotics  -f/u labs  -discussed with medicine and ICU team present at the RRT

## 2017-09-20 NOTE — CONSULT NOTE ADULT - PROBLEM SELECTOR PROBLEM 1
Cholecystitis
LFTs abnormal
Calculus of gallbladder with acute cholecystitis and obstruction

## 2017-09-20 NOTE — CHART NOTE - NSCHARTNOTEFT_GEN_A_CORE
Asked by RN to evaluate pt because he had c/o SOB last night.    Pt is awake, alert, orientated X 3. Found sitting OOB in a chair. He does not appear in any acute distress.    Vital Signs Last 24 Hrs  T(C): 37 (20 Sep 2017 05:40), Max: 37.4 (19 Sep 2017 11:25)  T(F): 98.6 (20 Sep 2017 05:40), Max: 99.4 (19 Sep 2017 11:25)  HR: 64 (20 Sep 2017 05:40) (64 - 93)  BP: 116/66 (20 Sep 2017 05:40) (112/68 - 142/94)  BP(mean): --  RR: 16 (20 Sep 2017 05:40) (16 - 18)  SpO2: 96% (20 Sep 2017 05:40) (95% - 98%)    Pt states he had some shortness of breath last night while he was laying bed. He felt better when he got oob to the chair. He rained in the chair all night.  He has been trying to use the incentive spirometer. "It hurts a lot when I get to a certain point."    Decreased breath sounds at bases bilaterally.    Discussed pt with Dr. Sheffield, via phone. Plan:  decrease IVF  restart carvdilol   medical consult - Dr. Lambert called  CXR  encourage use of incentive spirometer  try to ambulate more- continue PT Asked by RN to evaluate pt because he had c/o SOB last night.    Pt is awake, alert, orientated X 3. Found sitting OOB in a chair. He does not appear in any acute distress.    Vital Signs Last 24 Hrs  T(C): 37 (20 Sep 2017 05:40), Max: 37.4 (19 Sep 2017 11:25)  T(F): 98.6 (20 Sep 2017 05:40), Max: 99.4 (19 Sep 2017 11:25)  HR: 64 (20 Sep 2017 05:40) (64 - 93)  BP: 116/66 (20 Sep 2017 05:40) (112/68 - 142/94)  BP(mean): --  RR: 16 (20 Sep 2017 05:40) (16 - 18)  SpO2: 96% (20 Sep 2017 05:40) (95% - 98%)    Pt states he had some shortness of breath last night while he was laying bed. He felt better when he got oob to the chair. He rained in the chair all night.  He has been trying to use the incentive spirometer. "It hurts a lot when I get to a certain point."    Decreased breath sounds at bases bilaterally.    Discussed pt with Dr. Sheffield, via phone. Plan:  decrease IVF  call cardiology for Follow up.  continue meds, oral.  medical consult - Dr. Lambert called  CXR  encourage use of incentive spirometer  try to ambulate more- continue PT

## 2017-09-20 NOTE — CONSULT NOTE ADULT - PROBLEM SELECTOR RECOMMENDATION 2
stable with AICD, Pacemaker check less than 3 months ago. continue coreg
strict ins and outs  daily weights  obtain records and echo results from Dr. Martínez (Cardiology)  maintain on all home cardiac medications with appropriate holds for hypotension
strict ins and outs  daily weights  obtain records and echo results from Dr. Martínez (Cardiology)  maintain on all home cardiac medications with appropriate holds for hypotension

## 2017-09-21 DIAGNOSIS — K91.3 POSTPROCEDURAL INTESTINAL OBSTRUCTION: ICD-10-CM

## 2017-09-21 LAB
ALBUMIN SERPL ELPH-MCNC: 2.6 G/DL — LOW (ref 3.3–5)
ALP SERPL-CCNC: 75 U/L — SIGNIFICANT CHANGE UP (ref 40–120)
ALT FLD-CCNC: 40 U/L — SIGNIFICANT CHANGE UP (ref 12–78)
ANION GAP SERPL CALC-SCNC: 9 MMOL/L — SIGNIFICANT CHANGE UP (ref 5–17)
AST SERPL-CCNC: 32 U/L — SIGNIFICANT CHANGE UP (ref 15–37)
BILIRUB DIRECT SERPL-MCNC: 0.3 MG/DL — HIGH (ref 0.05–0.2)
BILIRUB INDIRECT FLD-MCNC: 0.7 MG/DL — SIGNIFICANT CHANGE UP (ref 0.2–1)
BILIRUB SERPL-MCNC: 1 MG/DL — SIGNIFICANT CHANGE UP (ref 0.2–1.2)
BUN SERPL-MCNC: 43 MG/DL — HIGH (ref 7–23)
CALCIUM SERPL-MCNC: 8.1 MG/DL — LOW (ref 8.5–10.1)
CHLORIDE SERPL-SCNC: 103 MMOL/L — SIGNIFICANT CHANGE UP (ref 96–108)
CK MB BLD-MCNC: 0.4 % — SIGNIFICANT CHANGE UP (ref 0–3.5)
CK MB CFR SERPL CALC: 1.7 NG/ML — SIGNIFICANT CHANGE UP (ref 0.5–3.6)
CK SERPL-CCNC: 386 U/L — HIGH (ref 26–308)
CO2 SERPL-SCNC: 26 MMOL/L — SIGNIFICANT CHANGE UP (ref 22–31)
CREAT SERPL-MCNC: 1.37 MG/DL — HIGH (ref 0.5–1.3)
GLUCOSE SERPL-MCNC: 108 MG/DL — HIGH (ref 70–99)
HCT VFR BLD CALC: 33 % — LOW (ref 39–50)
HGB BLD-MCNC: 10.7 G/DL — LOW (ref 13–17)
MAGNESIUM SERPL-MCNC: 2.5 MG/DL — SIGNIFICANT CHANGE UP (ref 1.6–2.6)
MCHC RBC-ENTMCNC: 30.4 PG — SIGNIFICANT CHANGE UP (ref 27–34)
MCHC RBC-ENTMCNC: 32.5 GM/DL — SIGNIFICANT CHANGE UP (ref 32–36)
MCV RBC AUTO: 93.4 FL — SIGNIFICANT CHANGE UP (ref 80–100)
PHOSPHATE SERPL-MCNC: 2.5 MG/DL — SIGNIFICANT CHANGE UP (ref 2.5–4.5)
PLATELET # BLD AUTO: 158 K/UL — SIGNIFICANT CHANGE UP (ref 150–400)
POTASSIUM SERPL-MCNC: 3.4 MMOL/L — LOW (ref 3.5–5.3)
POTASSIUM SERPL-SCNC: 3.4 MMOL/L — LOW (ref 3.5–5.3)
PROT SERPL-MCNC: 6.5 GM/DL — SIGNIFICANT CHANGE UP (ref 6–8.3)
RBC # BLD: 3.53 M/UL — LOW (ref 4.2–5.8)
RBC # FLD: 13.1 % — SIGNIFICANT CHANGE UP (ref 11–15)
SODIUM SERPL-SCNC: 138 MMOL/L — SIGNIFICANT CHANGE UP (ref 135–145)
TROPONIN I SERPL-MCNC: 0.03 NG/ML — SIGNIFICANT CHANGE UP (ref 0.01–0.04)
WBC # BLD: 10 K/UL — SIGNIFICANT CHANGE UP (ref 3.8–10.5)
WBC # FLD AUTO: 10 K/UL — SIGNIFICANT CHANGE UP (ref 3.8–10.5)

## 2017-09-21 PROCEDURE — 93010 ELECTROCARDIOGRAM REPORT: CPT

## 2017-09-21 RX ORDER — PANTOPRAZOLE SODIUM 20 MG/1
40 TABLET, DELAYED RELEASE ORAL DAILY
Qty: 0 | Refills: 0 | Status: DISCONTINUED | OUTPATIENT
Start: 2017-09-21 | End: 2017-09-28

## 2017-09-21 RX ORDER — POTASSIUM CHLORIDE 20 MEQ
40 PACKET (EA) ORAL ONCE
Qty: 0 | Refills: 0 | Status: COMPLETED | OUTPATIENT
Start: 2017-09-21 | End: 2017-09-21

## 2017-09-21 RX ORDER — ELECTROLYTE SOLUTION,INJ
1 VIAL (ML) INTRAVENOUS
Qty: 0 | Refills: 0 | Status: DISCONTINUED | OUTPATIENT
Start: 2017-09-21 | End: 2017-09-21

## 2017-09-21 RX ADMIN — Medication 40 MILLIEQUIVALENT(S): at 10:38

## 2017-09-21 RX ADMIN — HEPARIN SODIUM 5000 UNIT(S): 5000 INJECTION INTRAVENOUS; SUBCUTANEOUS at 17:56

## 2017-09-21 RX ADMIN — CARVEDILOL PHOSPHATE 12.5 MILLIGRAM(S): 80 CAPSULE, EXTENDED RELEASE ORAL at 17:56

## 2017-09-21 RX ADMIN — Medication 1 EACH: at 22:01

## 2017-09-21 RX ADMIN — PANTOPRAZOLE SODIUM 40 MILLIGRAM(S): 20 TABLET, DELAYED RELEASE ORAL at 23:06

## 2017-09-21 RX ADMIN — HYDROMORPHONE HYDROCHLORIDE 1 MILLIGRAM(S): 2 INJECTION INTRAMUSCULAR; INTRAVENOUS; SUBCUTANEOUS at 10:32

## 2017-09-21 RX ADMIN — HYDROMORPHONE HYDROCHLORIDE 1 MILLIGRAM(S): 2 INJECTION INTRAMUSCULAR; INTRAVENOUS; SUBCUTANEOUS at 18:09

## 2017-09-21 RX ADMIN — HYDROMORPHONE HYDROCHLORIDE 1 MILLIGRAM(S): 2 INJECTION INTRAMUSCULAR; INTRAVENOUS; SUBCUTANEOUS at 17:54

## 2017-09-21 RX ADMIN — HYDROMORPHONE HYDROCHLORIDE 1 MILLIGRAM(S): 2 INJECTION INTRAMUSCULAR; INTRAVENOUS; SUBCUTANEOUS at 04:23

## 2017-09-21 RX ADMIN — CARVEDILOL PHOSPHATE 12.5 MILLIGRAM(S): 80 CAPSULE, EXTENDED RELEASE ORAL at 06:12

## 2017-09-21 RX ADMIN — HYDROMORPHONE HYDROCHLORIDE 1 MILLIGRAM(S): 2 INJECTION INTRAMUSCULAR; INTRAVENOUS; SUBCUTANEOUS at 04:08

## 2017-09-21 RX ADMIN — HYDROMORPHONE HYDROCHLORIDE 1 MILLIGRAM(S): 2 INJECTION INTRAMUSCULAR; INTRAVENOUS; SUBCUTANEOUS at 10:18

## 2017-09-21 RX ADMIN — HEPARIN SODIUM 5000 UNIT(S): 5000 INJECTION INTRAVENOUS; SUBCUTANEOUS at 06:12

## 2017-09-21 NOTE — PROGRESS NOTE ADULT - SUBJECTIVE AND OBJECTIVE BOX
Assessment:    H/O CAD  Last cath 2015 at that time medical therapy was recommended  Echo 2015 EF 25-30% is chronic and well managed with medical therapy  Troponin negative  Chronic systolic CHF is noted from chart with EF 25%, AICD already in place  Acute Ruthy, stable post cholecystectomy  Beta blockers effective

## 2017-09-21 NOTE — CHART NOTE - NSCHARTNOTEFT_GEN_A_CORE
AM lab review:                          10.7   10.0  )-----------( 158      ( 21 Sep 2017 07:32 )             33.0     09-21    138  |  103  |  43<H>  ----------------------------<  108<H>  3.4<L>   |  26  |  1.37<H>    Ca    8.1<L>      21 Sep 2017 07:32  Phos  2.5     09-21  Mg     2.5     09-21    TPro  6.5  /  Alb  2.6<L>  /  TBili  1.0  /  DBili  .30<H>  /  AST  32  /  ALT  40  /  AlkPhos  75  09-21    Impression:  - hypokalemia  - elevated CK    Plan:  - PPN to correct electrolytes  - continue cardiology & medical f/u

## 2017-09-21 NOTE — PROGRESS NOTE ADULT - SUBJECTIVE AND OBJECTIVE BOX
Gastroenterology  Patient seen and examined bedside resting comfortably.  No complaints offered. OOB + incisional abdominal pain  Denies nausea and vomiting. NPO with very little drainage   minimal flatus/ no BM.     T(F): 98.2 (09-21-17 @ 05:40), Max: 99.8 (09-21-17 @ 00:25)  HR: 76 (09-21-17 @ 05:40) (69 - 97)  BP: 120/69 (09-21-17 @ 05:40) (110/73 - 154/76)  RR: 17 (09-21-17 @ 05:40) (15 - 18)  SpO2: 95% (09-21-17 @ 05:40) (91% - 98%)  Wt(kg): --  CAPILLARY BLOOD GLUCOSE  109 (21 Sep 2017 06:14)  126 (20 Sep 2017 23:11)  115 (20 Sep 2017 19:53)  116 (20 Sep 2017 18:16)  122 (20 Sep 2017 12:19)          PHYSICAL EXAM:  General: NAD, WDWN.   Neuro:  Alert & oriented x 3  HEENT: NCAT, EOMI, conjunctiva clear  CV: +S1+S2 regular rate and rhythm  Lung: clear to ausculation bilaterally, respirations nonlabored, good inspiratory effort  Abdomen: soft,+ TONY drain  incisional Tenderness, No distention Normal active BS  Extremities: no cyanosis, clubbing or edema    LABS:                        10.7   10.0  )-----------( 158      ( 21 Sep 2017 07:32 )             33.0     09-21    138  |  103  |  43<H>  ----------------------------<  108<H>  3.4<L>   |  26  |  1.37<H>    Ca    8.1<L>      21 Sep 2017 07:32  Phos  2.5     09-21  Mg     2.5     09-21    TPro  6.5  /  Alb  2.6<L>  /  TBili  1.0  /  DBili  .30<H>  /  AST  32  /  ALT  40  /  AlkPhos  75  09-21    LIVER FUNCTIONS - ( 21 Sep 2017 07:32 )  Alb: 2.6 g/dL / Pro: 6.5 gm/dL / ALK PHOS: 75 U/L / ALT: 40 U/L / AST: 32 U/L / GGT: x             I&O's Detail    20 Sep 2017 07:01  -  21 Sep 2017 07:00  --------------------------------------------------------  IN:    Fat Emulsion 20%: 166.4 mL    PPN (Peripheral Parenteral Nutrition): 1752 mL  Total IN: 1918.4 mL    OUT:    Bulb: 80 mL    Nasoenteral Tube: 190 mL    Voided: 850 mL  Total OUT: 1120 mL    Total NET: 798.4 mL        09-21 @ 07:32    138 | 103 | 43  /8.1 | 2.5 | 2.5  _______________________/  3.4 | 26 | 1.37                           \par

## 2017-09-21 NOTE — PROGRESS NOTE ADULT - SUBJECTIVE AND OBJECTIVE BOX
SURGERY PROGRESS HPI:  Pt seen and examined at bedside. Denies pain and complaints. Pt tolerating NGT. Pt denies nausea and vomiting. No BM/flatus. Voiding well. Pt denies chest pain, SOB, dizziness, fever, chills, palpitations. States he was able to sleep in bed all night. Ambulated down the hallways.    Vital Signs Last 24 Hrs  T(C): 36.8 (21 Sep 2017 05:40), Max: 37.7 (21 Sep 2017 00:25)  T(F): 98.2 (21 Sep 2017 05:40), Max: 99.8 (21 Sep 2017 00:25)  HR: 76 (21 Sep 2017 05:40) (69 - 97)  BP: 120/69 (21 Sep 2017 05:40) (110/73 - 154/76)  BP(mean): --  RR: 17 (21 Sep 2017 05:40) (15 - 18)  SpO2: 95% (21 Sep 2017 05:40) (91% - 98%)      PHYSICAL EXAM:    GENERAL: NAD  HEAD:  NGT bilious output 140cc/24hrs  CHEST/LUNG: Clear to ausculation, bilaterally   HEART: RRR S1S2  ABDOMEN: Dressing clean/dry/intact. Drain with serosang output 60cc/24hrs. non distended, +BS, soft, non tender, no guarding  EXTREMITIES:  calf soft, non tender b/l    I&O's Detail    19 Sep 2017 07:01  -  20 Sep 2017 07:00  --------------------------------------------------------  IN:    lactated ringers.: 1500 mL    Oral Fluid: 480 mL    PPN (Peripheral Parenteral Nutrition): 441 mL    Solution: 300 mL  Total IN: 2721 mL    OUT:    Bulb: 145 mL    Nasoenteral Tube: 200 mL    Voided: 1150 mL  Total OUT: 1495 mL    Total NET: 1226 mL      20 Sep 2017 07:01  -  21 Sep 2017 05:58  --------------------------------------------------------  IN:    Fat Emulsion 20%: 41.6 mL    PPN (Peripheral Parenteral Nutrition): 756 mL  Total IN: 797.6 mL    OUT:    Bulb: 60 mL    Nasoenteral Tube: 140 mL    Voided: 550 mL  Total OUT: 750 mL    Total NET: 47.6 mL      LABS:                        12.9   11.8  )-----------( 159      ( 20 Sep 2017 20:18 )             38.6     09-20    135  |  103  |  40<H>  ----------------------------<  126<H>  3.5   |  22  |  1.43<H>    Ca    8.3<L>      20 Sep 2017 20:18  Phos  1.6     09-20  Mg     2.4     09-20    TPro  7.1  /  Alb  3.0<L>  /  TBili  1.5<H>  /  DBili  .41<H>  /  AST  46<H>  /  ALT  59  /  AlkPhos  86  09-20    Xray Chest 1 View AP/PA. (09.20.17 @ 10:57)   IMPRESSION: NG tube in place. Poor inspiratory film shows no gross   infiltrate. Pacer device.    Assessment: 83 y/o male PMHx AICD/pacemaker, HTN, HLD, CAD/MI, acid reflux, BPH s/p lap converted to open joellen POD#3. Pathology is benign. V-tach last night, resolved.    Plan:  -NGT to LWS, NPO, IVF  -continue PPN per GI   -continue TONY drain care and monitor output  -pain management prn  -continue DVT prophylaxis, OOB, Ambulating  -continue incentive spirometer  -continue local wound care  -continue antibiotics,  -Cardiology Follow up.   -f/u labs  -will discuss pt with Dr. Sheffield

## 2017-09-21 NOTE — PROGRESS NOTE ADULT - SUBJECTIVE AND OBJECTIVE BOX
Patient is a 82y old  Male who presents with a chief complaint of intermittent epigastric discomfort with food intake since Monday (14 Sep 2017 03:20)  patient had non sustaine dtachycardia lastnight, .patient was asymptomatic. . No complaints now. AICD did not fire.     INTERVAL HPI/OVERNIGHT EVENTS: as above. Vitls stable. potassium 3.4    MEDICATIONS  (STANDING):  heparin  Injectable 5000 Unit(s) SubCutaneous every 12 hours  insulin lispro (HumaLOG) corrective regimen sliding scale   SubCutaneous three times a day before meals  insulin lispro (HumaLOG) corrective regimen sliding scale   SubCutaneous at bedtime  dextrose 50% Injectable 12.5 Gram(s) IV Push once  dextrose 50% Injectable 25 Gram(s) IV Push once  dextrose 50% Injectable 25 Gram(s) IV Push once  carvedilol 12.5 milliGRAM(s) Oral every 12 hours  Parenteral Nutrition - Adult 1 Each (83 mL/Hr) TPN Continuous <Continuous>  Parenteral Nutrition - Adult 1 Each (83 mL/Hr) TPN Continuous <Continuous>  potassium chloride    Tablet ER 40 milliEquivalent(s) Oral once    MEDICATIONS  (PRN):  ondansetron Injectable 4 milliGRAM(s) IV Push every 6 hours PRN Nausea  dextrose Gel 1 Dose(s) Oral once PRN Blood Glucose LESS THAN 70 milliGRAM(s)/deciliter  glucagon  Injectable 1 milliGRAM(s) IntraMuscular once PRN Glucose LESS THAN 70 milligrams/deciliter  HYDROmorphone  Injectable 1 milliGRAM(s) IV Push every 6 hours PRN Severe Pain (7 - 10)  morphine  - Injectable 4 milliGRAM(s) IV Push every 6 hours PRN Moderate Pain (4 - 6)      Allergies    No Known Allergies    Intolerances        REVIEW OF SYSTEMS:  CONSTITUTIONAL: No fever, weight loss, or fatigue  EYES: No eye pain, visual disturbances, or discharge  ENMT:  No difficulty hearing, tinnitus, vertigo; No sinus or throat pain  NECK: No pain or stiffness  BREASTS: No pain, masses, or nipple discharge  RESPIRATORY: No cough, wheezing, chills or hemoptysis; No shortness of breath  CARDIOVASCULAR: No chest pain, palpitations, dizziness, or leg swelling  GASTROINTESTINAL: No abdominal or epigastric pain. No nausea, vomiting, or hematemesis; No diarrhea or constipation. No melena or hematochezia.  GENITOURINARY: No dysuria, frequency, hematuria, or incontinence  NEUROLOGICAL: No headaches, memory loss, loss of strength, numbness, or tremors  SKIN: No itching, burning, rashes, or lesions   LYMPH NODES: No enlarged glands  ENDOCRINE: No heat or cold intolerance; No hair loss  MUSCULOSKELETAL: No joint pain or swelling; No muscle, back, or extremity pain  PSYCHIATRIC: No depression, anxiety, mood swings, or difficulty sleeping  HEME/LYMPH: No easy bruising, or bleeding gums  ALLERY AND IMMUNOLOGIC: No hives or eczema    Vital Signs Last 24 Hrs  T(C): 36.8 (21 Sep 2017 05:40), Max: 37.7 (21 Sep 2017 00:25)  T(F): 98.2 (21 Sep 2017 05:40), Max: 99.8 (21 Sep 2017 00:25)  HR: 76 (21 Sep 2017 05:40) (69 - 97)  BP: 120/69 (21 Sep 2017 05:40) (110/73 - 154/76)  BP(mean): --  RR: 17 (21 Sep 2017 05:40) (15 - 18)  SpO2: 95% (21 Sep 2017 05:40) (91% - 98%)    PHYSICAL EXAM:  GENERAL: NAD, well-groomed, well-developed  HEAD:  Atraumatic, Normocephalic  EYES: EOMI, PERRLA, conjunctiva and sclera clear  ENMT: No tonsillar erythema, exudates, or enlargement; Moist mucous membranes, Good dentition, No lesions  NECK: Supple, No JVD, Normal thyroid  NERVOUS SYSTEM:  Alert & Oriented X3, Good concentration; Motor Strength 5/5 B/L upper and lower extremities; DTRs 2+ intact and symmetric  CHEST/LUNG: Clear to percussion bilaterally; No rales, rhonchi, wheezing, or rubs  HEART: Regular rate and rhythm; No murmurs, rubs, or gallops  ABDOMEN: Soft, Nontender, Nondistended; Bowel sounds present  EXTREMITIES:  2+ Peripheral Pulses, No clubbing, cyanosis, or edema  LYMPH: No lymphadenopathy noted  SKIN: No rashes or lesions    LABS:                        10.7   10.0  )-----------( 158      ( 21 Sep 2017 07:32 )             33.0     09-21    138  |  103  |  43<H>  ----------------------------<  108<H>  3.4<L>   |  26  |  1.37<H>    Ca    8.1<L>      21 Sep 2017 07:32  Phos  2.5     09-21  Mg     2.5     09-21    TPro  6.5  /  Alb  2.6<L>  /  TBili  1.0  /  DBili  .30<H>  /  AST  32  /  ALT  40  /  AlkPhos  75  09-21          CAPILLARY BLOOD GLUCOSE  109 (21 Sep 2017 06:14)  126 (20 Sep 2017 23:11)  115 (20 Sep 2017 19:53)  116 (20 Sep 2017 18:16)  122 (20 Sep 2017 12:19)          CARDIAC MARKERS ( 21 Sep 2017 07:33 )  .025 ng/mL / x     / 386 U/L / x     / 1.7 ng/mL      Hemoglobin A1C, Whole Blood: 5.6 % (09-15 @ 09:35)        RADIOLOGY & ADDITIONAL TESTS:    Imaging Personally Reviewed:  [ ] YES  [ ] NO    Consultant(s) Notes Reviewed:  [ ] YES  [ ] NO    Care Discussed with Consultants/Other Providers [x ] YES  [ ] NO    Care discussed with family,         [  ]   yes  [  ]  No    imp:    stable[ ]    unstable[  ]     improving [ x  ]       unchanged  [  ]                Plans:  Continue present plans  [x  ] potassium supplementation               New consult [  ]   specialty  ....... cardiology               order test[  ]    test name.                  Discharge Planning  [  ]

## 2017-09-22 LAB
ALBUMIN SERPL ELPH-MCNC: 2.6 G/DL — LOW (ref 3.3–5)
ALP SERPL-CCNC: 72 U/L — SIGNIFICANT CHANGE UP (ref 40–120)
ALT FLD-CCNC: 35 U/L — SIGNIFICANT CHANGE UP (ref 12–78)
ANION GAP SERPL CALC-SCNC: 9 MMOL/L — SIGNIFICANT CHANGE UP (ref 5–17)
AST SERPL-CCNC: 28 U/L — SIGNIFICANT CHANGE UP (ref 15–37)
BILIRUB SERPL-MCNC: 1 MG/DL — SIGNIFICANT CHANGE UP (ref 0.2–1.2)
BUN SERPL-MCNC: 44 MG/DL — HIGH (ref 7–23)
CALCIUM SERPL-MCNC: 8.1 MG/DL — LOW (ref 8.5–10.1)
CHLORIDE SERPL-SCNC: 103 MMOL/L — SIGNIFICANT CHANGE UP (ref 96–108)
CO2 SERPL-SCNC: 25 MMOL/L — SIGNIFICANT CHANGE UP (ref 22–31)
CREAT SERPL-MCNC: 1.19 MG/DL — SIGNIFICANT CHANGE UP (ref 0.5–1.3)
GLUCOSE SERPL-MCNC: 111 MG/DL — HIGH (ref 70–99)
HCT VFR BLD CALC: 35.4 % — LOW (ref 39–50)
HGB BLD-MCNC: 11.8 G/DL — LOW (ref 13–17)
MAGNESIUM SERPL-MCNC: 2.6 MG/DL — SIGNIFICANT CHANGE UP (ref 1.6–2.6)
MCHC RBC-ENTMCNC: 31.7 PG — SIGNIFICANT CHANGE UP (ref 27–34)
MCHC RBC-ENTMCNC: 33.1 GM/DL — SIGNIFICANT CHANGE UP (ref 32–36)
MCV RBC AUTO: 95.8 FL — SIGNIFICANT CHANGE UP (ref 80–100)
PHOSPHATE SERPL-MCNC: 3 MG/DL — SIGNIFICANT CHANGE UP (ref 2.5–4.5)
PLATELET # BLD AUTO: 174 K/UL — SIGNIFICANT CHANGE UP (ref 150–400)
POTASSIUM SERPL-MCNC: 3.8 MMOL/L — SIGNIFICANT CHANGE UP (ref 3.5–5.3)
POTASSIUM SERPL-SCNC: 3.8 MMOL/L — SIGNIFICANT CHANGE UP (ref 3.5–5.3)
PROT SERPL-MCNC: 6.5 GM/DL — SIGNIFICANT CHANGE UP (ref 6–8.3)
RBC # BLD: 3.7 M/UL — LOW (ref 4.2–5.8)
RBC # FLD: 12.6 % — SIGNIFICANT CHANGE UP (ref 11–15)
SODIUM SERPL-SCNC: 137 MMOL/L — SIGNIFICANT CHANGE UP (ref 135–145)
WBC # BLD: 8.7 K/UL — SIGNIFICANT CHANGE UP (ref 3.8–10.5)
WBC # FLD AUTO: 8.7 K/UL — SIGNIFICANT CHANGE UP (ref 3.8–10.5)

## 2017-09-22 RX ORDER — ELECTROLYTE SOLUTION,INJ
1 VIAL (ML) INTRAVENOUS
Qty: 0 | Refills: 0 | Status: DISCONTINUED | OUTPATIENT
Start: 2017-09-22 | End: 2017-09-22

## 2017-09-22 RX ORDER — I.V. FAT EMULSION 20 G/100ML
250 EMULSION INTRAVENOUS ONCE
Qty: 0 | Refills: 0 | Status: COMPLETED | OUTPATIENT
Start: 2017-09-22 | End: 2017-09-22

## 2017-09-22 RX ORDER — FAMOTIDINE 10 MG/ML
20 INJECTION INTRAVENOUS ONCE
Qty: 0 | Refills: 0 | Status: COMPLETED | OUTPATIENT
Start: 2017-09-22 | End: 2017-09-22

## 2017-09-22 RX ADMIN — ONDANSETRON 4 MILLIGRAM(S): 8 TABLET, FILM COATED ORAL at 22:10

## 2017-09-22 RX ADMIN — PANTOPRAZOLE SODIUM 40 MILLIGRAM(S): 20 TABLET, DELAYED RELEASE ORAL at 12:30

## 2017-09-22 RX ADMIN — CARVEDILOL PHOSPHATE 12.5 MILLIGRAM(S): 80 CAPSULE, EXTENDED RELEASE ORAL at 17:46

## 2017-09-22 RX ADMIN — HYDROMORPHONE HYDROCHLORIDE 1 MILLIGRAM(S): 2 INJECTION INTRAMUSCULAR; INTRAVENOUS; SUBCUTANEOUS at 00:21

## 2017-09-22 RX ADMIN — MORPHINE SULFATE 4 MILLIGRAM(S): 50 CAPSULE, EXTENDED RELEASE ORAL at 20:27

## 2017-09-22 RX ADMIN — HEPARIN SODIUM 5000 UNIT(S): 5000 INJECTION INTRAVENOUS; SUBCUTANEOUS at 17:46

## 2017-09-22 RX ADMIN — Medication 1 EACH: at 22:30

## 2017-09-22 RX ADMIN — FAMOTIDINE 20 MILLIGRAM(S): 10 INJECTION INTRAVENOUS at 22:15

## 2017-09-22 RX ADMIN — HYDROMORPHONE HYDROCHLORIDE 1 MILLIGRAM(S): 2 INJECTION INTRAMUSCULAR; INTRAVENOUS; SUBCUTANEOUS at 08:25

## 2017-09-22 RX ADMIN — HEPARIN SODIUM 5000 UNIT(S): 5000 INJECTION INTRAVENOUS; SUBCUTANEOUS at 07:27

## 2017-09-22 RX ADMIN — MORPHINE SULFATE 4 MILLIGRAM(S): 50 CAPSULE, EXTENDED RELEASE ORAL at 21:26

## 2017-09-22 RX ADMIN — HYDROMORPHONE HYDROCHLORIDE 1 MILLIGRAM(S): 2 INJECTION INTRAMUSCULAR; INTRAVENOUS; SUBCUTANEOUS at 08:11

## 2017-09-22 RX ADMIN — HYDROMORPHONE HYDROCHLORIDE 1 MILLIGRAM(S): 2 INJECTION INTRAMUSCULAR; INTRAVENOUS; SUBCUTANEOUS at 00:35

## 2017-09-22 RX ADMIN — CARVEDILOL PHOSPHATE 12.5 MILLIGRAM(S): 80 CAPSULE, EXTENDED RELEASE ORAL at 07:27

## 2017-09-22 RX ADMIN — I.V. FAT EMULSION 10.42 MILLILITER(S): 20 EMULSION INTRAVENOUS at 12:30

## 2017-09-22 NOTE — CHART NOTE - NSCHARTNOTEFT_GEN_A_CORE
Called by RN to evaluate patient with swelling of the left foot. Patient seen at bedside and states that yesterday afternoon his foot began to swell. He states that he had this issue once before, but with a different toe.     Vital Signs Last 24 Hrs  T(F): 97 (09-22-17 @ 16:08), Max: 98.6 (09-21-17 @ 17:53)  HR: 84 (09-22-17 @ 16:08)  BP: 152/77 (09-22-17 @ 16:08)  RR: 16 (09-22-17 @ 16:08)  SpO2: 97% (09-22-17 @ 16:08)  CAPILLARY BLOOD GLUCOSE: 110 (22 Sep 2017 16:08)    GENERAL: Alert, NAD  EXTREMITIES:  dorsal portion of left foot noted to have erythema, edema, and warmth extending to great toe. Nontender to palpation. DP pulses 2+    I&O's Detail    21 Sep 2017 07:01  -  22 Sep 2017 07:00  --------------------------------------------------------  IN:    Fat Emulsion 20%: 84 mL    PPN (Peripheral Parenteral Nutrition): 1992 mL  Total IN: 2076 mL    OUT:    Bulb: 75 mL    Nasoenteral Tube: 350 mL  Total OUT: 425 mL    Total NET: 1651 mL    22 Sep 2017 07:01  -  22 Sep 2017 16:12  --------------------------------------------------------  IN:  Total IN: 0 mL    OUT:    Bulb: 15 mL    Nasoenteral Tube: 100 mL  Total OUT: 115 mL    Total NET: -115 mL    LABS:                        11.8   8.7   )-----------( 174      ( 22 Sep 2017 06:52 )             35.4     09-22    137  |  103  |  44<H>  ----------------------------<  111<H>  3.8   |  25  |  1.19    Ca    8.1<L>      22 Sep 2017 06:52  Phos  3.0     09-22  Mg     2.6     09-22    TPro  6.5  /  Alb  2.6<L>  /  TBili  1.0  /  DBili  x   /  AST  28  /  ALT  35  /  AlkPhos  72  09-22    82y old Male s/p open cholecystectomy POD#4 with PMH Pneumonia, CAD, Myocardial infarct, BPH, Diabetes mellitus type 2, diet-controlled, Dyslipidemia, and Hypertension. Now with erythema, edema and warmth of left foot: r/o DVT vs. cellulitis vs. Gout    - venous doppler ordered, will follow up results  - monitor WBC and vital signs  - monitor site for extension

## 2017-09-22 NOTE — PROGRESS NOTE ADULT - SUBJECTIVE AND OBJECTIVE BOX
Assessment:    H/O CAD  Last cath 2015 at that time medical therapy was recommended  Echo 2015 EF 25-30% is chronic and well managed with medical therapy  Troponin negative  Chronic systolic CHF is noted from chart with EF 25%, AICD already in place  Acute Ruthy, stable post cholecystectomy, surgical f/u noted  Beta blockers effective

## 2017-09-22 NOTE — PROGRESS NOTE ADULT - SUBJECTIVE AND OBJECTIVE BOX
Gastroenterolgy  Patient seen and examined bedside resting comfortably.  No complaints offered.   + incisional abdominal pain  Denies nausea and vomiting. NPO  + flatus/ no bm    T(F): 98.2 (09-22-17 @ 05:00), Max: 98.6 (09-21-17 @ 17:53)  HR: 83 (09-22-17 @ 05:00) (77 - 84)  BP: 134/82 (09-22-17 @ 05:00) (123/87 - 140/80)  RR: 16 (09-22-17 @ 05:00) (16 - 16)  SpO2: 97% (09-22-17 @ 05:00) (94% - 97%)  Wt(kg): --  CAPILLARY BLOOD GLUCOSE  123 (22 Sep 2017 07:30)  105 (21 Sep 2017 18:04)  125 (21 Sep 2017 12:20)    PHYSICAL EXAM:  General: NAD, WDWN.   Neuro:  Alert & oriented x 3  HEENT: NCAT, EOMI, conjunctiva clear  CV: +S1+S2 regular rate and rhythm  Lung: clear to ausculation bilaterally, respirations nonlabored, good inspiratory effort  Abdomen: soft,+ TONY drain  incisional Tenderness, No distention Normal active BS  Extremities: no cyanosis, clubbing or edema    LABS:                        11.8   8.7   )-----------( 174      ( 22 Sep 2017 06:52 )             35.4     09-22    137  |  103  |  44<H>  ----------------------------<  111<H>  3.8   |  25  |  1.19    Ca    8.1<L>      22 Sep 2017 06:52  Phos  3.0     09-22  Mg     2.6     09-22    TPro  6.5  /  Alb  2.6<L>  /  TBili  1.0  /  DBili  x   /  AST  28  /  ALT  35  /  AlkPhos  72  09-22    LIVER FUNCTIONS - ( 22 Sep 2017 06:52 )  Alb: 2.6 g/dL / Pro: 6.5 gm/dL / ALK PHOS: 72 U/L / ALT: 35 U/L / AST: 28 U/L / GGT: x             I&O's Detail    21 Sep 2017 07:01  -  22 Sep 2017 07:00  --------------------------------------------------------  IN:    Fat Emulsion 20%: 84 mL    PPN (Peripheral Parenteral Nutrition): 1992 mL  Total IN: 2076 mL    OUT:    Bulb: 75 mL    Nasoenteral Tube: 350 mL  Total OUT: 425 mL    Total NET: 1651 mL        09-22 @ 06:52    137 | 103 | 44  /8.1 | 2.6 | 3.0  _______________________/  3.8 | 25 | 1.19                           \par

## 2017-09-22 NOTE — PROGRESS NOTE ADULT - SUBJECTIVE AND OBJECTIVE BOX
SURGERY PROGRESS HPI:  Pt seen and examined at bedside. Denies pain and complaints. Pt tolerating NGT. Pt denies nausea and vomiting. States he passed flatus 6 times. No BM. Voiding well. Pt denies chest pain, SOB, dizziness, fever, chills. Ambulated down the hallways again yesterday.    Vital Signs Last 24 Hrs  T(C): 36.8 (22 Sep 2017 05:00), Max: 37 (21 Sep 2017 11:30)  T(F): 98.2 (22 Sep 2017 05:00), Max: 98.6 (21 Sep 2017 11:30)  HR: 83 (22 Sep 2017 05:00) (73 - 84)  BP: 134/82 (22 Sep 2017 05:00) (103/64 - 140/80)  BP(mean): --  RR: 16 (22 Sep 2017 05:00) (15 - 16)  SpO2: 97% (22 Sep 2017 05:00) (94% - 98%)      PHYSICAL EXAM:    GENERAL: NAD  HEAD:  NGT bilious output 800cc/24hrs  CHEST/LUNG: Clear to ausculation, bilaterally   HEART: RRR S1S2  ABDOMEN: Dressing clean/dry/intact. Drain with serosang output 70cc/24hrs. non distended, +BS, soft, non tender, no guarding  EXTREMITIES:  calf soft, non tender b/l    I&O's Detail    20 Sep 2017 07:01  -  21 Sep 2017 07:00  --------------------------------------------------------  IN:    Fat Emulsion 20%: 166.4 mL    PPN (Peripheral Parenteral Nutrition): 1752 mL  Total IN: 1918.4 mL    OUT:    Bulb: 80 mL    Nasoenteral Tube: 190 mL    Voided: 850 mL  Total OUT: 1120 mL    Total NET: 798.4 mL      21 Sep 2017 07:01  -  22 Sep 2017 06:12  --------------------------------------------------------  IN:    Fat Emulsion 20%: 84 mL    PPN (Peripheral Parenteral Nutrition): 996 mL  Total IN: 1080 mL    OUT:    Bulb: 70 mL    Nasoenteral Tube: 200 mL  Total OUT: 270 mL    Total NET: 810 mL          LABS:                        10.7   10.0  )-----------( 158      ( 21 Sep 2017 07:32 )             33.0     09-21    138  |  103  |  43<H>  ----------------------------<  108<H>  3.4<L>   |  26  |  1.37<H>    Ca    8.1<L>      21 Sep 2017 07:32  Phos  2.5     09-21  Mg     2.5     09-21    TPro  6.5  /  Alb  2.6<L>  /  TBili  1.0  /  DBili  .30<H>  /  AST  32  /  ALT  40  /  AlkPhos  75  09-21      Assessment: 81 y/o male PMHx AICD/pacemaker, HTN, HLD, CAD/MI, acid reflux, BPH s/p lap converted to open joellen POD#4. Pathology is benign. V-tach 9/20, resolved.    Plan:  -NGT to LWS, NPO, IVF. Will start clear liquids later today  -continue PPN per GI   -continue TONY drain care and monitor output  -pain management prn  -continue DVT prophylaxis, OOB, Ambulating  -continue incentive spirometer  -continue local wound care  -Cardiology Follow up. Medicine Follow up   -f/u labs  -will discuss pt with Dr. Sheffield SURGERY PROGRESS HPI:  Pt seen and examined at bedside. Denies pain and complaints. Pt tolerating NGT. Pt denies nausea and vomiting. States he passed flatus 6 times. No BM. Voiding well. Pt denies chest pain, SOB, dizziness, fever, chills. Ambulated down the hallways again yesterday.    Vital Signs Last 24 Hrs  T(C): 36.8 (22 Sep 2017 05:00), Max: 37 (21 Sep 2017 11:30)  T(F): 98.2 (22 Sep 2017 05:00), Max: 98.6 (21 Sep 2017 11:30)  HR: 83 (22 Sep 2017 05:00) (73 - 84)  BP: 134/82 (22 Sep 2017 05:00) (103/64 - 140/80)  BP(mean): --  RR: 16 (22 Sep 2017 05:00) (15 - 16)  SpO2: 97% (22 Sep 2017 05:00) (94% - 98%)      PHYSICAL EXAM:    GENERAL: NAD  HEAD:  NGT bilious output 800cc/24hrs  CHEST/LUNG: Clear to ausculation, bilaterally   HEART: RRR S1S2  ABDOMEN: Dressing clean/dry/intact.  TONY Drain with serosang output 70cc/24hrs. non distended, +BS, soft, non tender, no guarding  EXTREMITIES:  calf soft, non tender b/l    I&O's Detail    20 Sep 2017 07:01  -  21 Sep 2017 07:00  --------------------------------------------------------  IN:    Fat Emulsion 20%: 166.4 mL    PPN (Peripheral Parenteral Nutrition): 1752 mL  Total IN: 1918.4 mL    OUT:    Bulb: 80 mL    Nasoenteral Tube: 190 mL    Voided: 850 mL  Total OUT: 1120 mL    Total NET: 798.4 mL      21 Sep 2017 07:01  -  22 Sep 2017 06:12  --------------------------------------------------------  IN:    Fat Emulsion 20%: 84 mL    PPN (Peripheral Parenteral Nutrition): 996 mL  Total IN: 1080 mL    OUT:    Bulb: 70 mL    Nasoenteral Tube: 200 mL  Total OUT: 270 mL    Total NET: 810 mL          LABS:                        10.7   10.0  )-----------( 158      ( 21 Sep 2017 07:32 )             33.0     09-21    138  |  103  |  43<H>  ----------------------------<  108<H>  3.4<L>   |  26  |  1.37<H>    Ca    8.1<L>      21 Sep 2017 07:32  Phos  2.5     09-21  Mg     2.5     09-21    TPro  6.5  /  Alb  2.6<L>  /  TBili  1.0  /  DBili  .30<H>  /  AST  32  /  ALT  40  /  AlkPhos  75  09-21      Assessment: 83 y/o male PMHx AICD/pacemaker, HTN, HLD, CAD/MI, acid reflux, BPH s/p lap converted to open joellen POD#4. Pathology is benign. V-tach 9/20, resolved.    Plan:  -NGT to LWS, IVF. Will start clear liquids later today  -continue PPN per GI   -continue TONY drain care and monitor output  -pain management prn  -continue DVT prophylaxis, OOB, Ambulating  -continue incentive spirometer  -continue local wound care  -Cardiology Follow up. Medicine Follow up   -f/u labs  -will discuss pt with Dr. Sheffield

## 2017-09-22 NOTE — PROGRESS NOTE ADULT - SUBJECTIVE AND OBJECTIVE BOX
Patient is a 82y old  Male who presents with a chief complaint of intermittent epigastric discomfort with food intake since Monday (14 Sep 2017 03:20)  s/p open joellen. AICD with low LVEF     INTERVAL HPI/OVERNIGHT EVENTS: uneventful. Vitals stable. No cough . No fever.  passing flatus    MEDICATIONS  (STANDING):  heparin  Injectable 5000 Unit(s) SubCutaneous every 12 hours  insulin lispro (HumaLOG) corrective regimen sliding scale   SubCutaneous three times a day before meals  insulin lispro (HumaLOG) corrective regimen sliding scale   SubCutaneous at bedtime  dextrose 50% Injectable 12.5 Gram(s) IV Push once  dextrose 50% Injectable 25 Gram(s) IV Push once  dextrose 50% Injectable 25 Gram(s) IV Push once  carvedilol 12.5 milliGRAM(s) Oral every 12 hours  Parenteral Nutrition - Adult 1 Each (83 mL/Hr) TPN Continuous <Continuous>  pantoprazole  Injectable 40 milliGRAM(s) IV Push daily    MEDICATIONS  (PRN):  ondansetron Injectable 4 milliGRAM(s) IV Push every 6 hours PRN Nausea  dextrose Gel 1 Dose(s) Oral once PRN Blood Glucose LESS THAN 70 milliGRAM(s)/deciliter  glucagon  Injectable 1 milliGRAM(s) IntraMuscular once PRN Glucose LESS THAN 70 milligrams/deciliter  HYDROmorphone  Injectable 1 milliGRAM(s) IV Push every 6 hours PRN Severe Pain (7 - 10)  morphine  - Injectable 4 milliGRAM(s) IV Push every 6 hours PRN Moderate Pain (4 - 6)      Allergies    No Known Allergies    Intolerances        REVIEW OF SYSTEMS:  CONSTITUTIONAL: No fever, weight loss, or fatigue  EYES: No eye pain, visual disturbances, or discharge  ENMT:  No difficulty hearing, tinnitus, vertigo; No sinus or throat pain  NECK: No pain or stiffness  BREASTS: No pain, masses, or nipple discharge  RESPIRATORY: No cough, wheezing, chills or hemoptysis; No shortness of breath  CARDIOVASCULAR: No chest pain, palpitations, dizziness, or leg swelling  GASTROINTESTINAL: No abdominal or epigastric pain. No nausea, vomiting, or hematemesis; No diarrhea or constipation. No melena or hematochezia.  GENITOURINARY: No dysuria, frequency, hematuria, or incontinence  NEUROLOGICAL: No headaches, memory loss, loss of strength, numbness, or tremors  SKIN: No itching, burning, rashes, or lesions   LYMPH NODES: No enlarged glands  ENDOCRINE: No heat or cold intolerance; No hair loss  MUSCULOSKELETAL: No joint pain or swelling; No muscle, back, or extremity pain  PSYCHIATRIC: No depression, anxiety, mood swings, or difficulty sleeping  HEME/LYMPH: No easy bruising, or bleeding gums  ALLERY AND IMMUNOLOGIC: No hives or eczema    Vital Signs Last 24 Hrs  T(C): 36.8 (22 Sep 2017 05:00), Max: 37 (21 Sep 2017 11:30)  T(F): 98.2 (22 Sep 2017 05:00), Max: 98.6 (21 Sep 2017 11:30)  HR: 83 (22 Sep 2017 05:00) (73 - 84)  BP: 134/82 (22 Sep 2017 05:00) (103/64 - 140/80)  BP(mean): --  RR: 16 (22 Sep 2017 05:00) (15 - 16)  SpO2: 97% (22 Sep 2017 05:00) (94% - 98%)    PHYSICAL EXAM:  GENERAL: NAD, well-groomed, well-developed  HEAD:  Atraumatic, Normocephalic  EYES: EOMI, PERRLA, conjunctiva and sclera clear  ENMT: No tonsillar erythema, exudates, or enlargement; Moist mucous membranes, Good dentition, No lesions  NECK: Supple, No JVD, Normal thyroid  NERVOUS SYSTEM:  Alert & Oriented X3, Good concentration; Motor Strength 5/5 B/L upper and lower extremities; DTRs 2+ intact and symmetric  CHEST/LUNG: Clear to percussion bilaterally; No rales, rhonchi, wheezing, or rubs  HEART: Regular rate and rhythm; No murmurs, rubs, or gallops  ABDOMEN: Soft, Nontender, Nondistended; Bowel sounds present. NG Tube in place  EXTREMITIES:  2+ Peripheral Pulses, No clubbing, cyanosis, or edema  LYMPH: No lymphadenopathy noted  SKIN: No rashes or lesions    LABS:                        11.8   8.7   )-----------( 174      ( 22 Sep 2017 06:52 )             35.4     09-22    137  |  103  |  44<H>  ----------------------------<  111<H>  3.8   |  25  |  1.19    Ca    8.1<L>      22 Sep 2017 06:52  Phos  3.0     09-22  Mg     2.6     09-22    TPro  6.5  /  Alb  2.6<L>  /  TBili  1.0  /  DBili  x   /  AST  28  /  ALT  35  /  AlkPhos  72  09-22          CAPILLARY BLOOD GLUCOSE  123 (22 Sep 2017 07:30)  105 (21 Sep 2017 18:04)  125 (21 Sep 2017 12:20)          CARDIAC MARKERS ( 21 Sep 2017 07:33 )  .025 ng/mL / x     / 386 U/L / x     / 1.7 ng/mL            RADIOLOGY & ADDITIONAL TESTS:    Imaging Personally Reviewed:  [ ] YES  [ ] NO    Consultant(s) Notes Reviewed:  [ ] YES  [ ] NO    Care Discussed with Consultants/Other Providers [ ] YES  [ ] NO    Care discussed with family,         [  ]   yes  [  ]  No    imp:    stable[ ]    unstable[  ]     improving [  x]       unchanged  [  ]                Plans:  Continue present plans  [x  ]               New consult [  ]   specialty  .......               order test[  ]    test name.                  Discharge Planning  [  ]

## 2017-09-23 LAB
ALBUMIN SERPL ELPH-MCNC: 2.5 G/DL — LOW (ref 3.3–5)
ALP SERPL-CCNC: 390 U/L — HIGH (ref 40–120)
ALT FLD-CCNC: 92 U/L — HIGH (ref 12–78)
ANION GAP SERPL CALC-SCNC: 12 MMOL/L — SIGNIFICANT CHANGE UP (ref 5–17)
ANION GAP SERPL CALC-SCNC: 14 MMOL/L — SIGNIFICANT CHANGE UP (ref 5–17)
APPEARANCE UR: CLEAR — SIGNIFICANT CHANGE UP
AST SERPL-CCNC: 95 U/L — HIGH (ref 15–37)
BACTERIA # UR AUTO: ABNORMAL
BILIRUB SERPL-MCNC: 1.7 MG/DL — HIGH (ref 0.2–1.2)
BILIRUB UR-MCNC: ABNORMAL
BUN SERPL-MCNC: 42 MG/DL — HIGH (ref 7–23)
BUN SERPL-MCNC: 54 MG/DL — HIGH (ref 7–23)
CALCIUM SERPL-MCNC: 7.9 MG/DL — LOW (ref 8.5–10.1)
CALCIUM SERPL-MCNC: 8.3 MG/DL — LOW (ref 8.5–10.1)
CHLORIDE SERPL-SCNC: 101 MMOL/L — SIGNIFICANT CHANGE UP (ref 96–108)
CHLORIDE SERPL-SCNC: 101 MMOL/L — SIGNIFICANT CHANGE UP (ref 96–108)
CO2 SERPL-SCNC: 20 MMOL/L — LOW (ref 22–31)
CO2 SERPL-SCNC: 24 MMOL/L — SIGNIFICANT CHANGE UP (ref 22–31)
COLOR SPEC: YELLOW — SIGNIFICANT CHANGE UP
CREAT SERPL-MCNC: 1.34 MG/DL — HIGH (ref 0.5–1.3)
CREAT SERPL-MCNC: 2.2 MG/DL — HIGH (ref 0.5–1.3)
DIFF PNL FLD: NEGATIVE — SIGNIFICANT CHANGE UP
GLUCOSE SERPL-MCNC: 131 MG/DL — HIGH (ref 70–99)
GLUCOSE SERPL-MCNC: 160 MG/DL — HIGH (ref 70–99)
GLUCOSE UR QL: NEGATIVE MG/DL — SIGNIFICANT CHANGE UP
HCT VFR BLD CALC: 31.3 % — LOW (ref 39–50)
HCT VFR BLD CALC: 33.7 % — LOW (ref 39–50)
HGB BLD-MCNC: 10.6 G/DL — LOW (ref 13–17)
HGB BLD-MCNC: 11.7 G/DL — LOW (ref 13–17)
KETONES UR-MCNC: NEGATIVE — SIGNIFICANT CHANGE UP
LEUKOCYTE ESTERASE UR-ACNC: ABNORMAL
MAGNESIUM SERPL-MCNC: 2.4 MG/DL — SIGNIFICANT CHANGE UP (ref 1.6–2.6)
MCHC RBC-ENTMCNC: 31.6 PG — SIGNIFICANT CHANGE UP (ref 27–34)
MCHC RBC-ENTMCNC: 32.3 PG — SIGNIFICANT CHANGE UP (ref 27–34)
MCHC RBC-ENTMCNC: 34 GM/DL — SIGNIFICANT CHANGE UP (ref 32–36)
MCHC RBC-ENTMCNC: 34.7 GM/DL — SIGNIFICANT CHANGE UP (ref 32–36)
MCV RBC AUTO: 93 FL — SIGNIFICANT CHANGE UP (ref 80–100)
MCV RBC AUTO: 93.3 FL — SIGNIFICANT CHANGE UP (ref 80–100)
NITRITE UR-MCNC: NEGATIVE — SIGNIFICANT CHANGE UP
PH UR: 5 — SIGNIFICANT CHANGE UP (ref 5–8)
PHOSPHATE SERPL-MCNC: 4 MG/DL — SIGNIFICANT CHANGE UP (ref 2.5–4.5)
PLATELET # BLD AUTO: 171 K/UL — SIGNIFICANT CHANGE UP (ref 150–400)
PLATELET # BLD AUTO: 188 K/UL — SIGNIFICANT CHANGE UP (ref 150–400)
POTASSIUM SERPL-MCNC: 3.6 MMOL/L — SIGNIFICANT CHANGE UP (ref 3.5–5.3)
POTASSIUM SERPL-MCNC: 4.1 MMOL/L — SIGNIFICANT CHANGE UP (ref 3.5–5.3)
POTASSIUM SERPL-SCNC: 3.6 MMOL/L — SIGNIFICANT CHANGE UP (ref 3.5–5.3)
POTASSIUM SERPL-SCNC: 4.1 MMOL/L — SIGNIFICANT CHANGE UP (ref 3.5–5.3)
PROT SERPL-MCNC: 6.6 GM/DL — SIGNIFICANT CHANGE UP (ref 6–8.3)
PROT UR-MCNC: 30 MG/DL
RBC # BLD: 3.36 M/UL — LOW (ref 4.2–5.8)
RBC # BLD: 3.61 M/UL — LOW (ref 4.2–5.8)
RBC # FLD: 12.8 % — SIGNIFICANT CHANGE UP (ref 11–15)
RBC # FLD: 12.9 % — SIGNIFICANT CHANGE UP (ref 11–15)
RBC CASTS # UR COMP ASSIST: ABNORMAL /HPF (ref 0–4)
SODIUM SERPL-SCNC: 135 MMOL/L — SIGNIFICANT CHANGE UP (ref 135–145)
SODIUM SERPL-SCNC: 137 MMOL/L — SIGNIFICANT CHANGE UP (ref 135–145)
SP GR SPEC: 1.01 — SIGNIFICANT CHANGE UP (ref 1.01–1.02)
URATE SERPL-MCNC: 7.8 MG/DL — SIGNIFICANT CHANGE UP (ref 3.4–8.8)
UROBILINOGEN FLD QL: 4 MG/DL
WBC # BLD: 10.5 K/UL — SIGNIFICANT CHANGE UP (ref 3.8–10.5)
WBC # BLD: 16.4 K/UL — HIGH (ref 3.8–10.5)
WBC # FLD AUTO: 10.5 K/UL — SIGNIFICANT CHANGE UP (ref 3.8–10.5)
WBC # FLD AUTO: 16.4 K/UL — HIGH (ref 3.8–10.5)
WBC UR QL: SIGNIFICANT CHANGE UP

## 2017-09-23 PROCEDURE — 93971 EXTREMITY STUDY: CPT | Mod: 26

## 2017-09-23 PROCEDURE — 74000: CPT | Mod: 26

## 2017-09-23 PROCEDURE — 71010: CPT | Mod: 26

## 2017-09-23 RX ORDER — PIPERACILLIN AND TAZOBACTAM 4; .5 G/20ML; G/20ML
3.38 INJECTION, POWDER, LYOPHILIZED, FOR SOLUTION INTRAVENOUS EVERY 8 HOURS
Qty: 0 | Refills: 0 | Status: DISCONTINUED | OUTPATIENT
Start: 2017-09-23 | End: 2017-09-28

## 2017-09-23 RX ORDER — ACETAMINOPHEN 500 MG
650 TABLET ORAL EVERY 6 HOURS
Qty: 0 | Refills: 0 | Status: DISCONTINUED | OUTPATIENT
Start: 2017-09-23 | End: 2017-09-23

## 2017-09-23 RX ORDER — VALSARTAN 80 MG/1
80 TABLET ORAL DAILY
Qty: 0 | Refills: 0 | Status: DISCONTINUED | OUTPATIENT
Start: 2017-09-23 | End: 2017-09-23

## 2017-09-23 RX ORDER — SODIUM CHLORIDE 9 MG/ML
1000 INJECTION INTRAMUSCULAR; INTRAVENOUS; SUBCUTANEOUS
Qty: 0 | Refills: 0 | Status: DISCONTINUED | OUTPATIENT
Start: 2017-09-23 | End: 2017-09-23

## 2017-09-23 RX ORDER — ACETAMINOPHEN 500 MG
650 TABLET ORAL EVERY 8 HOURS
Qty: 0 | Refills: 0 | Status: DISCONTINUED | OUTPATIENT
Start: 2017-09-23 | End: 2017-09-23

## 2017-09-23 RX ORDER — ELECTROLYTE SOLUTION,INJ
1 VIAL (ML) INTRAVENOUS
Qty: 0 | Refills: 0 | Status: DISCONTINUED | OUTPATIENT
Start: 2017-09-23 | End: 2017-09-23

## 2017-09-23 RX ORDER — SODIUM CHLORIDE 9 MG/ML
500 INJECTION INTRAMUSCULAR; INTRAVENOUS; SUBCUTANEOUS ONCE
Qty: 0 | Refills: 0 | Status: COMPLETED | OUTPATIENT
Start: 2017-09-23 | End: 2017-09-23

## 2017-09-23 RX ORDER — VANCOMYCIN HCL 1 G
1000 VIAL (EA) INTRAVENOUS ONCE
Qty: 0 | Refills: 0 | Status: COMPLETED | OUTPATIENT
Start: 2017-09-23 | End: 2017-09-23

## 2017-09-23 RX ORDER — ACETAMINOPHEN 500 MG
650 TABLET ORAL EVERY 6 HOURS
Qty: 0 | Refills: 0 | Status: DISCONTINUED | OUTPATIENT
Start: 2017-09-23 | End: 2017-09-28

## 2017-09-23 RX ORDER — HYDROCHLOROTHIAZIDE 25 MG
12.5 TABLET ORAL DAILY
Qty: 0 | Refills: 0 | Status: DISCONTINUED | OUTPATIENT
Start: 2017-09-23 | End: 2017-09-23

## 2017-09-23 RX ORDER — HYDRALAZINE HCL 50 MG
10 TABLET ORAL EVERY 6 HOURS
Qty: 0 | Refills: 0 | Status: DISCONTINUED | OUTPATIENT
Start: 2017-09-23 | End: 2017-09-28

## 2017-09-23 RX ORDER — CARVEDILOL PHOSPHATE 80 MG/1
12.5 CAPSULE, EXTENDED RELEASE ORAL EVERY 12 HOURS
Qty: 0 | Refills: 0 | Status: DISCONTINUED | OUTPATIENT
Start: 2017-09-23 | End: 2017-09-23

## 2017-09-23 RX ADMIN — HEPARIN SODIUM 5000 UNIT(S): 5000 INJECTION INTRAVENOUS; SUBCUTANEOUS at 17:10

## 2017-09-23 RX ADMIN — Medication 1 EACH: at 22:36

## 2017-09-23 RX ADMIN — SODIUM CHLORIDE 2000 MILLILITER(S): 9 INJECTION INTRAMUSCULAR; INTRAVENOUS; SUBCUTANEOUS at 18:05

## 2017-09-23 RX ADMIN — PANTOPRAZOLE SODIUM 40 MILLIGRAM(S): 20 TABLET, DELAYED RELEASE ORAL at 12:01

## 2017-09-23 RX ADMIN — Medication 250 MILLIGRAM(S): at 13:57

## 2017-09-23 RX ADMIN — Medication 1: at 17:09

## 2017-09-23 RX ADMIN — HEPARIN SODIUM 5000 UNIT(S): 5000 INJECTION INTRAVENOUS; SUBCUTANEOUS at 06:43

## 2017-09-23 RX ADMIN — Medication 650 MILLIGRAM(S): at 13:18

## 2017-09-23 RX ADMIN — HYDROMORPHONE HYDROCHLORIDE 1 MILLIGRAM(S): 2 INJECTION INTRAMUSCULAR; INTRAVENOUS; SUBCUTANEOUS at 10:36

## 2017-09-23 RX ADMIN — PIPERACILLIN AND TAZOBACTAM 25 GRAM(S): 4; .5 INJECTION, POWDER, LYOPHILIZED, FOR SOLUTION INTRAVENOUS at 15:41

## 2017-09-23 RX ADMIN — PIPERACILLIN AND TAZOBACTAM 25 GRAM(S): 4; .5 INJECTION, POWDER, LYOPHILIZED, FOR SOLUTION INTRAVENOUS at 22:36

## 2017-09-23 RX ADMIN — HYDROMORPHONE HYDROCHLORIDE 1 MILLIGRAM(S): 2 INJECTION INTRAMUSCULAR; INTRAVENOUS; SUBCUTANEOUS at 11:04

## 2017-09-23 RX ADMIN — SODIUM CHLORIDE 1000 MILLILITER(S): 9 INJECTION INTRAMUSCULAR; INTRAVENOUS; SUBCUTANEOUS at 21:19

## 2017-09-23 RX ADMIN — Medication 650 MILLIGRAM(S): at 01:19

## 2017-09-23 RX ADMIN — CARVEDILOL PHOSPHATE 12.5 MILLIGRAM(S): 80 CAPSULE, EXTENDED RELEASE ORAL at 06:45

## 2017-09-23 NOTE — PROGRESS NOTE ADULT - SUBJECTIVE AND OBJECTIVE BOX
Assessment:    H/O CAD  Last cath 2015 at that time medical therapy was recommended  Echo 2015 EF 25-30% is chronic and well managed with medical therapy  Troponin negative  Chronic systolic CHF is noted from chart with EF 25%, AICD already in place  Acute Ruthy, stable post cholecystectomy, surgical f/u noted  Hypotension today, fever to 101, WBC to 16  Bolus 500cc, another 500-1000 warranted  Likely infection  requires ICU team evaluation for possible ICU transfer

## 2017-09-23 NOTE — PROGRESS NOTE ADULT - SUBJECTIVE AND OBJECTIVE BOX
Patient is a 82y old  Male who presents with a chief complaint of intermittent epigastric discomfort with food intake since Monday (14 Sep 2017 03:20)  intermittent fever with chills/ rigors.  Hepatic profile  shows elevation in Bilirubin, Alk phos, AST and ALT.     INTERVAL HPI/OVERNIGHT EVENTS: had fever last night with rigors. He was given liquids by mouth and he strted to retch, Now NPO status. Now he is having rigors again. c/o mild cough with some phegm    MEDICATIONS  (STANDING):  heparin  Injectable 5000 Unit(s) SubCutaneous every 12 hours  insulin lispro (HumaLOG) corrective regimen sliding scale   SubCutaneous three times a day before meals  insulin lispro (HumaLOG) corrective regimen sliding scale   SubCutaneous at bedtime  dextrose 50% Injectable 12.5 Gram(s) IV Push once  dextrose 50% Injectable 25 Gram(s) IV Push once  dextrose 50% Injectable 25 Gram(s) IV Push once  carvedilol 12.5 milliGRAM(s) Oral every 12 hours  pantoprazole  Injectable 40 milliGRAM(s) IV Push daily  Parenteral Nutrition - Adult 1 Each (83 mL/Hr) TPN Continuous <Continuous>  piperacillin/tazobactam IVPB. 3.375 Gram(s) IV Intermittent every 8 hours    MEDICATIONS  (PRN):  ondansetron Injectable 4 milliGRAM(s) IV Push every 6 hours PRN Nausea  dextrose Gel 1 Dose(s) Oral once PRN Blood Glucose LESS THAN 70 milliGRAM(s)/deciliter  glucagon  Injectable 1 milliGRAM(s) IntraMuscular once PRN Glucose LESS THAN 70 milligrams/deciliter  HYDROmorphone  Injectable 1 milliGRAM(s) IV Push every 6 hours PRN Severe Pain (7 - 10)  morphine  - Injectable 4 milliGRAM(s) IV Push every 6 hours PRN Moderate Pain (4 - 6)  acetaminophen  Suppository 650 milliGRAM(s) Rectal every 8 hours PRN For Temp greater than 38 C (100.4 F)      Allergies    No Known Allergies    Intolerances        REVIEW OF SYSTEMS:  CONSTITUTIONAL: No fever, weight loss, or fatigue  EYES: No eye pain, visual disturbances, or discharge  ENMT:  No difficulty hearing, tinnitus, vertigo; No sinus or throat pain  NECK: No pain or stiffness  BREASTS: No pain, masses, or nipple discharge  RESPIRATORY: No cough, wheezing, chills or hemoptysis; No shortness of breath  CARDIOVASCULAR: No chest pain, palpitations, dizziness, or leg swelling  GASTROINTESTINAL: No abdominal or epigastric pain. No nausea, vomiting, or hematemesis; No diarrhea or constipation. No melena or hematochezia.  GENITOURINARY: No dysuria, frequency, hematuria, or incontinence  NEUROLOGICAL: No headaches, memory loss, loss of strength, numbness, or tremors  SKIN: No itching, burning, rashes, or lesions   LYMPH NODES: No enlarged glands  ENDOCRINE: No heat or cold intolerance; No hair loss  MUSCULOSKELETAL: No joint pain or swelling; No muscle, back, or extremity pain  PSYCHIATRIC: No depression, anxiety, mood swings, or difficulty sleeping  HEME/LYMPH: No easy bruising, or bleeding gums  ALLERY AND IMMUNOLOGIC: No hives or eczema    Vital Signs Last 24 Hrs  T(C): 38.1 (23 Sep 2017 05:04), Max: 39.2 (23 Sep 2017 00:45)  T(F): 100.6 (23 Sep 2017 05:04), Max: 102.6 (23 Sep 2017 00:45)  HR: 79 (23 Sep 2017 05:04) (79 - 96)  BP: 107/58 (23 Sep 2017 05:04) (105/54 - 152/77)  BP(mean): --  RR: 18 (23 Sep 2017 05:04) (16 - 18)  SpO2: 92% (23 Sep 2017 05:04) (92% - 97%)    PHYSICAL EXAM:  GENERAL: NAD, well-groomed, well-developed  HEAD:  Atraumatic, Normocephalic  EYES: EOMI, PERRLA, conjunctiva and sclera clear  ENMT: No tonsillar erythema, exudates, or enlargement; Moist mucous membranes, Good dentition, No lesions  NECK: Supple, No JVD, Normal thyroid  NERVOUS SYSTEM:  Alert & Oriented X3, Good concentration; Motor Strength 5/5 B/L upper and lower extremities; DTRs 2+ intact and symmetric  CHEST/LUNG: Clear to percussion bilaterally; No rales, rhonchi, wheezing, or rubs  HEART: Regular rate and rhythm; No murmurs, rubs, or gallops  ABDOMEN: Soft, Nontender, Nondistended; Bowel sounds present  EXTREMITIES:  2+ Peripheral Pulses, No clubbing, cyanosis, or edema  LYMPH: No lymphadenopathy noted  SKIN: No rashes or lesions. Minimal erythma around  left 1st Mp joint, but there is no tenderness.     LABS:                        11.7   10.5  )-----------( 188      ( 23 Sep 2017 06:35 )             33.7     09-23    137  |  101  |  42<H>  ----------------------------<  131<H>  3.6   |  24  |  1.34<H>    Ca    8.3<L>      23 Sep 2017 06:35  Phos  4.0     09-23  Mg     2.4     09-23    TPro  6.6  /  Alb  2.5<L>  /  TBili  1.7<H>  /  DBili  x   /  AST  95<H>  /  ALT  92<H>  /  AlkPhos  390<H>  09-23          CAPILLARY BLOOD GLUCOSE  144 (23 Sep 2017 07:46)  147 (22 Sep 2017 22:36)  110 (22 Sep 2017 16:08)  103 (22 Sep 2017 12:25)                    RADIOLOGY & ADDITIONAL TESTS:    Imaging Personally Reviewed:  [ ] YES  [ ] NO    Consultant(s) Notes Reviewed:  [ ] YES  [ ] NO    Care Discussed with Consultants/Other Providers [ ] YES  [ ] NO    Care discussed with family,         [  ]   yes  [  ]  No    imp:    stable[ ]    unstable[  ]     improving [   ]       unchanged  [x  ]                Plans:  Continue present plans  [ x ] as per surgery. discussed with dr Sheffield, will restart Zosyn               New consult [  ]   specialty  .......               order test[  ]    test name.  bllod cultures/ chest xray. urinalysis stat ordered. uric acid level,                  Discharge Planning  [  ]

## 2017-09-23 NOTE — CHART NOTE - NSCHARTNOTEFT_GEN_A_CORE
Informed by RN of hypotension. 500ml bolus was given at 6pm. Patient seen and evaluated at bedside. Patient remains asymptomatic. Denies complaints. States he passed flatus after his BM this morning. Denies abdominal pain, dizziness, SOB, chest pain, palpitations, nausea/vomiting, fever/chills.     Vitals: Temp:98, HR: 72, BP: 85/42, RR:18, spO2:96 on room air    GENERAL: Alert, NAD  CHEST/LUNG: Clear to auscultation bilaterally, respirations nonlabored  HEART: S1S2, Regular rate and rhythm;   ABDOMEN: Nondistended. +Bowel sounds, soft, Nontender. RUQ staple line c/d/i with no drainage. RUQ TONY drain with serosanguineous drainage. Umbilical staples c/d/i with small ecchymosis noted to infraumbilical area.   EXTREMITIES:  no calf tenderness, No edema. Left foot erythema, warmth, and swelling resolved.                         10.6   16.4  )-----------( 171      ( 23 Sep 2017 19:27 )             31.3   09-23    135  |  101  |  54<H>  ----------------------------<  160<H>  4.1   |  20<L>  |  2.20<H>    Ca    7.9<L>      23 Sep 2017 19:27  Phos  4.0     09-23  Mg     2.4     09-23    TPro  6.6  /  Alb  2.5<L>  /  TBili  1.7<H>  /  DBili  x   /  AST  95<H>  /  ALT  92<H>  /  AlkPhos  390<H>  09-23    Urinalysis + Microscopic Examination (09.23.17 @ 11:13)    Urine Appearance: Clear    Urobilinogen: 4 mg/dL    Specific Gravity: 1.015    Protein, Urine: 30 mg/dL    pH Urine: 5.0    Leukocyte Esterase Concentration: Trace    Nitrite: Negative    Ketone - Urine: Negative    Bilirubin: Small    Color: Yellow    Glucose Qualitative, Urine: Negative mg/dL    Blood, Urine: Negative    Red Blood Cell - Urine: 3-5 /HPF    White Blood Cell - Urine: 3-5    Bacteria: Few    Xray Chest 1 View AP/PA. (09.23.17 @ 11:11): There is no focal consolidation or pleural effusion, although the studies limited by suboptimal inspiration.  Heart size is within normal limits. There is a left-sided cardiac pacing device in place. The osseous structures are intact.    Xray Kidney Ureter Bladder (09.23.17 @ 11:11): There is a surgical drain in the right upper quadrant and surgical staples are seen in the abdominal midline and right upper quadrant. There is a nonspecific bowel gas pattern. There are degenerative changes of the lumbar spine.    Assessment: 82y old Male PMHx Pneumonia, CAD, Myocardial infarct, BPH, Diabetes mellitus type 2, diet-controlled, Dyslipidemia, Hypertension.  s/p laparoscopic converted to open cholecystectomy secondary to acute on chronic cholecystitis POD#5, febrile overnight with elevation of LFT's. Now with asymptomatic hypotension. WBC:16.4. Afebrile.     Plan:  - Another Fluid bolus of 500cc NS ordered. (500cc bolus previously given at 6pm)  - Strict I&O's   - continue PPN  - hold BP meds  - continue to monitor vital signs closely  - Continue abx. Awaiting ID consult tonight  - Discussed with Dr. Sheffield

## 2017-09-23 NOTE — CHART NOTE - NSCHARTNOTEFT_GEN_A_CORE
Called by RN to evaluate patient with hypotension which worsens when he is resting/ sleeping. Patient seen at bedside and denies any pain, dizziness, lightheadedness, chest pain, SOB, abdominal pain, fever or chills.     Vital Signs Last 24 Hrs  T(C): 37.7 (23 Sep 2017 17:17), Max: 39.2 (23 Sep 2017 00:45)  T(F): 99.8 (23 Sep 2017 17:17), Max: 102.6 (23 Sep 2017 00:45)  HR: 83 (23 Sep 2017 17:17) (79 - 96)  BP: 90/51 (23 Sep 2017 17:17) (90/51 - 132/61)  RR: 18 (23 Sep 2017 17:17) (18 - 18)  SpO2: 95% (23 Sep 2017 17:17) (90% - 95%)                          11.7   10.5  )-----------( 188      ( 23 Sep 2017 06:35 )             33.7   09-23    137  |  101  |  42<H>  ----------------------------<  131<H>  3.6   |  24  |  1.34<H>    Ca    8.3<L>      23 Sep 2017 06:35  Phos  4.0     09-23  Mg     2.4     09-23    TPro  6.6  /  Alb  2.5<L>  /  TBili  1.7<H>  /  DBili  x   /  AST  95<H>  /  ALT  92<H>  /  AlkPhos  390<H>  09-23    Urinalysis + Microscopic Examination (09.23.17 @ 11:13)    Urine Appearance: Clear    Urobilinogen: 4 mg/dL    Specific Gravity: 1.015    Protein, Urine: 30 mg/dL    pH Urine: 5.0    Leukocyte Esterase Concentration: Trace    Nitrite: Negative    Ketone - Urine: Negative    Bilirubin: Small    Color: Yellow    Glucose Qualitative, Urine: Negative mg/dL    Blood, Urine: Negative    Red Blood Cell - Urine: 3-5 /HPF    White Blood Cell - Urine: 3-5    Bacteria: Few    from: Xray Chest 1 View AP/PA. (09.23.17 @ 11:11): There is no focal consolidation or pleural effusion, although the studies limited by suboptimal inspiration.  Heart size is within normal limits. There is a left-sided cardiac pacing device in place. The osseous structures are intact.    from: Xray Kidney Ureter Bladder (09.23.17 @ 11:11): There is a surgical drain in the right upper quadrant and surgical staples are seen in the abdominal midline and right upper quadrant. There is a nonspecific bowel gas pattern. There are degenerative changes of the lumbar spine.    82y old  Male s/p laparoscopic converted to open cholecystectomy secondary to acute on chronic cholecystitis POD#5, febrile overnight with elevation of LFT's. PMH Pneumonia, CAD, Myocardial infarct, BPH, Diabetes mellitus type 2, diet-controlled, Dyslipidemia, Hypertension. Now with hypotension secondary to infection vs. medication?    - Fluid bolus of 500cc NS ordered  - IVF not started as patient receiving PPN  - holding parameters on ALL Blood pressure medications.   - UA, CXR, KUB from today appreciated  - will repeat CBC and BMP  - continue to monitor vital signs closely  - Zosyn resumed today and vanco 1Gm given x1  - ID consult pending  - Discussed with cardiology  - continue telemetry monitoring

## 2017-09-23 NOTE — PROGRESS NOTE ADULT - SUBJECTIVE AND OBJECTIVE BOX
Patient seen and examined at bedside with no complaints, states that he feels a bit warm. Admits to "good BMthis morning." Denies pain, nausea/vomiting. States NGT "fell out" earlier today. Left foot pain improved.     Vital Signs Last 24 Hrs  T(F): 99.8 (09-23-17 @ 10:59), Max: 102.6 (09-23-17 @ 00:45)  HR: 95 (09-23-17 @ 10:59)  BP: 132/61 (09-23-17 @ 10:59)  RR: 18 (09-23-17 @ 10:59)  SpO2: 95% (09-23-17 @ 10:59)  CAPILLARY BLOOD GLUCOSE:  143 (23 Sep 2017 11:25)    GENERAL: Alert, NAD  CHEST/LUNG: Clear to auscultation bilaterally, respirations nonlabored  HEART: S1S2, Regular rate and rhythm;   ABDOMEN: + Bowel sounds, soft, Nontender, mild distention. Dressing removed. RUQ staple line c/d/i with no drainage. RUQ TONY drain with serosanguineous drainage: 15ml/24 hours. Drain milked with no increase in output. Small amount of clot expressed from tubing. Umbilical staples c/d/i with ecchymosis noted to infraumbilical area. DSD applied over drain site, other incision sites left open to air.   EXTREMITIES:  no calf tenderness, No edema. Left foot erythema, warmth, and swelling resolved.     I&O's Detail    22 Sep 2017 07:01  -  23 Sep 2017 07:00  --------------------------------------------------------  IN:    Fat Emulsion 20%: 31.2 mL    PPN (Peripheral Parenteral Nutrition): 249 mL  Total IN: 280.2 mL    OUT:    Bulb: 15 mL    Nasoenteral Tube: 100 mL    Voided: 300 mL  Total OUT: 415 mL    Total NET: -134.8 mL    LABS:                        11.7   10.5  )-----------( 188      ( 23 Sep 2017 06:35 )             33.7     09-23    137  |  101  |  42<H>  ----------------------------<  131<H>  3.6   |  24  |  1.34<H>    Ca    8.3<L>      23 Sep 2017 06:35  Phos  4.0     09-23  Mg     2.4     09-23    TPro  6.6  /  Alb  2.5<L>  /  TBili  1.7<H>  /  DBili  x   /  AST  95<H>  /  ALT  92<H>  /  AlkPhos  390<H>  09-23    RADIOLOGY & ADDITIONAL STUDIES:  from:  Duplex Venous Lower Ext Ltd, Left (09.23.17 @ 08:44):  No evidence of left lower extremity deep venous thrombosis. Subcutaneous swelling and edema over the left great toe.    82y old  Male s/p laparoscopic converted to open cholecystectomy secondary to acute on chronic cholecystitis POD#5, febrile overnight with elevation of LFT's. PMH Pneumonia, CAD, Myocardial infarct, BPH, Diabetes mellitus type 2, diet-controlled, Dyslipidemia, Hypertension    - continue local wound care, monitor TONY for output  - monitor abdomen, may need to reinsert NGT if nausea/ vomiting occur  - continue NPO except medications, continue PPN until tolerating oral diet.   - continue zosyn, will consult ID  - f/u labs, blood culture, urine culture, CXR and KUB  - home anti-hypertensive's resumed with holding parameters  - DVT prophylaxis, Incentive Spirometer, OOB, Ambulating, pain control  - continue current management per medicine  - will discuss with Dr. Sheffield Patient seen and examined at bedside with no complaints, states that he feels a bit warm. Admits to "good BM this morning." Denies pain, nausea/vomiting. States NGT "fell out" earlier today. Left foot pain improved. Actually 101. female.  fever     Vital Signs Last 24 Hrs  T(F): 99.8 (09-23-17 @ 10:59), Max: 102.6 (09-23-17 @ 00:45)  HR: 95 (09-23-17 @ 10:59)  BP: 132/61 (09-23-17 @ 10:59)  RR: 18 (09-23-17 @ 10:59)  SpO2: 95% (09-23-17 @ 10:59)  CAPILLARY BLOOD GLUCOSE:  143 (23 Sep 2017 11:25)    GENERAL: Alert, NAD  CHEST/LUNG: Clear to auscultation bilaterally, respirations nonlabored  HEART: S1S2, Regular rate and rhythm;   ABDOMEN: + Bowel sounds, soft, Nontender, non distention. Dressing removed. RUQ staple line c/d/i with no drainage. RUQ TONY drain with serosanguineous drainage: 15ml/24 hours. Drain milked with no increase in output. Small amount of clot expressed from tubing. Umbilical staples c/d/i with small ecchymosis noted to infraumbilical area. DSD applied over drain site, other incision sites left open to air.   EXTREMITIES:  no calf tenderness, No edema. Left foot erythema, warmth, and swelling resolved.     I&O's Detail    22 Sep 2017 07:01  -  23 Sep 2017 07:00  --------------------------------------------------------  IN:    Fat Emulsion 20%: 31.2 mL    PPN (Peripheral Parenteral Nutrition): 249 mL  Total IN: 280.2 mL    OUT:    Bulb: 15 mL    Nasoenteral Tube: 100 mL    Voided: 300 mL  Total OUT: 415 mL    Total NET: -134.8 mL    LABS:                        11.7   10.5  )-----------( 188      ( 23 Sep 2017 06:35 )             33.7     09-23    137  |  101  |  42<H>  ----------------------------<  131<H>  3.6   |  24  |  1.34<H>    Ca    8.3<L>      23 Sep 2017 06:35  Phos  4.0     09-23  Mg     2.4     09-23    TPro  6.6  /  Alb  2.5<L>  /  TBili  1.7<H>  /  DBili  x   /  AST  95<H>  /  ALT  92<H>  /  AlkPhos  390<H>  09-23    RADIOLOGY & ADDITIONAL STUDIES:  from: US Duplex Venous Lower Ext Ltd, Left (09.23.17 @ 08:44):  No evidence of left lower extremity deep venous thrombosis. Subcutaneous swelling and edema over the left great toe.    82y old  Male s/p laparoscopic converted to open cholecystectomy secondary to acute on chronic cholecystitis POD#5, febrile overnight with  elevation of LFT's. PMH Pneumonia, CAD, Myocardial infarct, BPH, Diabetes mellitus type 2, diet-controlled, Dyslipidemia, Hypertension    - continue local wound care, monitor TONY for output  - monitor abdomen, may need to reinsert NGT if nausea/ vomiting occur  - NPO except medications, continue PPN until tolerating oral diet.   - Re-start zosyn, will consult ID  - f/u labs, blood culture, urine culture, CXR and KUB  - home anti-hypertensive's resumed with holding parameters  - DVT prophylaxis, Incentive Spirometer, OOB, Ambulating, pain control  - continue current management per medicine and Cardiology  - will discuss with Dr. Sheffield Patient seen and examined at bedside with no complaints, states that he feels a bit warm. Admits to "good BM this morning." Denies pain, nausea/vomiting. States NGT "fell out" earlier today. Left foot pain improved.     Vital Signs Last 24 Hrs  T(F): 99.8 (09-23-17 @ 10:59), Max: 102.6 (09-23-17 @ 00:45)  HR: 95 (09-23-17 @ 10:59)  BP: 132/61 (09-23-17 @ 10:59)  RR: 18 (09-23-17 @ 10:59)  SpO2: 95% (09-23-17 @ 10:59)  CAPILLARY BLOOD GLUCOSE:  143 (23 Sep 2017 11:25)    GENERAL: Alert, NAD  CHEST/LUNG: Clear to auscultation bilaterally, respirations nonlabored  HEART: S1S2, Regular rate and rhythm;   ABDOMEN: + Bowel sounds, soft, Nontender, non distention. Dressing removed. RUQ staple line c/d/i with no drainage. RUQ TONY drain with serosanguineous drainage: 15ml/24 hours. Drain milked with no increase in output. Small amount of clot expressed from tubing. Umbilical staples c/d/i with small ecchymosis noted to infraumbilical area. DSD applied over drain site, other incision sites left open to air.   EXTREMITIES:  no calf tenderness, No edema. Left foot erythema, warmth, and swelling resolved.     I&O's Detail    22 Sep 2017 07:01  -  23 Sep 2017 07:00  --------------------------------------------------------  IN:    Fat Emulsion 20%: 31.2 mL    PPN (Peripheral Parenteral Nutrition): 249 mL  Total IN: 280.2 mL    OUT:    Bulb: 15 mL    Nasoenteral Tube: 100 mL    Voided: 300 mL  Total OUT: 415 mL    Total NET: -134.8 mL    LABS:                        11.7   10.5  )-----------( 188      ( 23 Sep 2017 06:35 )             33.7     09-23    137  |  101  |  42<H>  ----------------------------<  131<H>  3.6   |  24  |  1.34<H>    Ca    8.3<L>      23 Sep 2017 06:35  Phos  4.0     09-23  Mg     2.4     09-23    TPro  6.6  /  Alb  2.5<L>  /  TBili  1.7<H>  /  DBili  x   /  AST  95<H>  /  ALT  92<H>  /  AlkPhos  390<H>  09-23    RADIOLOGY & ADDITIONAL STUDIES:  from: US Duplex Venous Lower Ext Ltd, Left (09.23.17 @ 08:44):  No evidence of left lower extremity deep venous thrombosis. Subcutaneous swelling and edema over the left great toe.    82y old  Male s/p laparoscopic converted to open cholecystectomy secondary to acute on chronic cholecystitis POD#5, febrile overnight with  elevation of LFT's. PMH Pneumonia, CAD, Myocardial infarct, BPH, Diabetes mellitus type 2, diet-controlled, Dyslipidemia, Hypertension    - continue local wound care, monitor TONY for output  - monitor abdomen, may need to reinsert NGT if nausea/ vomiting occur  - NPO except medications, continue PPN until tolerating oral diet.   - Re-start zosyn, will consult ID  - f/u labs, blood culture, urine culture, CXR and KUB  - home anti-hypertensive's resumed with holding parameters  - DVT prophylaxis, Incentive Spirometer, OOB, Ambulating, pain control  - continue current management per medicine and Cardiology  - will discuss with Dr. Sheffield

## 2017-09-23 NOTE — PROGRESS NOTE ADULT - SUBJECTIVE AND OBJECTIVE BOX
COVERING FOR DR SALINAS    Patient is a 82y old  Male who presents with a chief complaint of intermittent epigastric discomfort with food intake since Monday (14 Sep 2017 03:20)      HPI:  83 y/o male PMHx AICD/pacemaker, HTN, HLD, CAD/MI, DM, EF of 25%, acid reflux, BPH with intermittent epigastric discomfort with food intake lasting for 1-2 hours per day over past three days. Slight nausea earlier yesterday. Denies abdominal pain or discomfort now. Last BM was yesterday and normal. Patient last ate at 4pm. No hx colonoscopy/endoscopy. Patient denies fever, chills, nausea, vomiting, constipation, diarrhea, hematuria, melena, hematochezia, chest pain, shortness of breath, dizziness, palpitations. Patient denies prior incident. (14 Sep 2017 03:20)      INTERVAL HPI/OVERNIGHT EVENTS:  Patient experienced N/V after taking clear liquids. States that he is passing flatus.    MEDICATIONS  (STANDING):  heparin  Injectable 5000 Unit(s) SubCutaneous every 12 hours  insulin lispro (HumaLOG) corrective regimen sliding scale   SubCutaneous three times a day before meals  insulin lispro (HumaLOG) corrective regimen sliding scale   SubCutaneous at bedtime  dextrose 50% Injectable 12.5 Gram(s) IV Push once  dextrose 50% Injectable 25 Gram(s) IV Push once  dextrose 50% Injectable 25 Gram(s) IV Push once  pantoprazole  Injectable 40 milliGRAM(s) IV Push daily  Parenteral Nutrition - Adult 1 Each (83 mL/Hr) TPN Continuous <Continuous>  piperacillin/tazobactam IVPB. 3.375 Gram(s) IV Intermittent every 8 hours  Parenteral Nutrition - Adult 1 Each (83 mL/Hr) TPN Continuous <Continuous>    MEDICATIONS  (PRN):  ondansetron Injectable 4 milliGRAM(s) IV Push every 6 hours PRN Nausea  dextrose Gel 1 Dose(s) Oral once PRN Blood Glucose LESS THAN 70 milliGRAM(s)/deciliter  glucagon  Injectable 1 milliGRAM(s) IntraMuscular once PRN Glucose LESS THAN 70 milligrams/deciliter  acetaminophen   Tablet 650 milliGRAM(s) Oral every 6 hours PRN For Temp greater than 38 C (100.4 F)  hydrALAZINE Injectable 10 milliGRAM(s) IV Push every 6 hours PRN onl;y for SBP greater than 160      FAMILY HISTORY:  No pertinent family history in first degree relatives      Allergies    No Known Allergies    Intolerances        PMH/PSH:  Pneumonia  CAD (coronary artery disease)  Myocardial infarct  BPH (Benign Prostatic Hyperplasia)  Diabetes mellitus type 2, diet-controlled  Dyslipidemia  Hypertension  H/O prostate biopsy  Skin lesion of right leg        REVIEW OF SYSTEMS:  CONSTITUTIONAL: No fever, weight loss, or fatigue  EYES: No eye pain, visual disturbances, or discharge  ENMT:  No difficulty hearing, tinnitus, vertigo; No sinus or throat pain  NECK: No pain or stiffness  BREASTS: No pain, masses, or nipple discharge  RESPIRATORY: No cough, wheezing, chills or hemoptysis; No shortness of breath  CARDIOVASCULAR: No chest pain, palpitations, dizziness, or leg swelling  GASTROINTESTINAL: SEE ABOVE	  GENITOURINARY: No dysuria, frequency, hematuria, or incontinence  NEUROLOGICAL: No headaches, memory loss, loss of strength, numbness, or tremors  SKIN: No itching, burning, rashes, or lesions   LYMPH NODES: No enlarged glands  ENDOCRINE: No heat or cold intolerance; No hair loss  MUSCULOSKELETAL: No joint pain or swelling; No muscle, back, or extremity pain  PSYCHIATRIC: No depression, anxiety, mood swings, or difficulty sleeping  HEME/LYMPH: No easy bruising, or bleeding gums  ALLERGY AND IMMUNOLOGIC: No hives or eczema    Vital Signs Last 24 Hrs  T(C): 36.7 (23 Sep 2017 20:50), Max: 39.2 (23 Sep 2017 00:45)  T(F): 98 (23 Sep 2017 20:50), Max: 102.6 (23 Sep 2017 00:45)  HR: 72 (23 Sep 2017 20:50) (72 - 96)  BP: 85/42 (23 Sep 2017 20:50) (85/42 - 132/61)  BP(mean): --  RR: 18 (23 Sep 2017 20:50) (18 - 18)  SpO2: 96% (23 Sep 2017 20:50) (90% - 96%)    PHYSICAL EXAM:  GENERAL: NAD, well-groomed, well-developed  HEAD:  Atraumatic, Normocephalic  EYES: EOMI, PERRLA, conjunctiva and sclera clear  NECK: Supple, No JVD, Normal thyroid  NERVOUS SYSTEM:  Alert & Oriented X3, Good concentration;   CHEST/LUNG: Clear to percussion bilaterally; No rales, rhonchi, wheezing, or rubs  HEART: Regular rate and rhythm; No murmurs, rubs, or gallops  ABDOMEN: Soft, Nontender, Nondistended; Bowel sounds diminished  EXTREMITIES:  2+ Peripheral Pulses, No clubbing, cyanosis, or edema  LYMPH: No lymphadenopathy noted  SKIN: No rashes or lesions    LAB                          10.6   16.4  )-----------( 171      ( 23 Sep 2017 19:27 )             31.3       CBC:   @ 19:27  WBC 16.4   Hgb 10.6   Hct 31.3   Plts 171  MCV 93.0   @ 06:35  WBC 10.5   Hgb 11.7   Hct 33.7   Plts 188  MCV 93.3   @ 06:52  WBC 8.7   Hgb 11.8   Hct 35.4   Plts 174  MCV 95.8   @ 07:32  WBC 10.0   Hgb 10.7   Hct 33.0   Plts 158  MCV 93.4   @ 20:18  WBC 11.8   Hgb 12.9   Hct 38.6   Plts 159  MCV 95.1   @ 06:58  WBC 12.5   Hgb 13.4   Hct 41.6   Plts 187  MCV 98.1   @ 06:27  WBC 10.9   Hgb 14.4   Hct 43.0   Plts 187  MCV 93.6   @ 15:56  WBC 9.0   Hgb 14.9   Hct 45.5   Plts 175  MCV 97.2   @ 07:20  WBC 6.8   Hgb 15.0   Hct 45.0   Plts 188  MCV 96.6   @ 06:50  WBC 6.1   Hgb 13.8   Hct 41.4   Plts 178  MCV 93.3      Chemistry:   @ 19:27  Na+ 135  K+ 4.1  Cl- 101  CO2 20  BUN 54  Cr 2.20      @ 06:35  Na+ 137  K+ 3.6  Cl- 101  CO2 24  BUN 42  Cr 1.34      @ 06:52  Na+ 137  K+ 3.8  Cl- 103  CO2 25  BUN 44  Cr 1.19      @ 07:32  Na+ 138  K+ 3.4  Cl- 103  CO2 26  BUN 43  Cr 1.37      @ 20:18  Na+ 135  K+ 3.5  Cl- 103  CO2 22  BUN 40  Cr 1.43      @ 06:58  Na+ 138  K+ 4.2  Cl- 103  CO2 24  BUN 31  Cr 1.72      @ 06:27  Na+ 140  K+ 4.3  Cl- 105  CO2 25  BUN 24  Cr 1.46      @ 15:56  Na+ 140  K+ 4.7  Cl- 107  CO2 24  BUN 19  Cr 1.48      @ 07:20  Na+ 140  K+ 4.0  Cl- 105  CO2 24  BUN 19  Cr 1.43      @ 06:50  Na+ 142  K+ 3.7  Cl- 107  CO2 26  BUN 15  Cr 1.37         Glucose, Serum: 160 mg/dL ( @ 19:27)  Glucose, Serum: 131 mg/dL ( @ 06:35)  Glucose, Serum: 111 mg/dL ( @ 06:52)  Glucose, Serum: 108 mg/dL ( @ 07:32)  Glucose, Serum: 126 mg/dL ( @ 20:18)  Glucose, Serum: 136 mg/dL ( @ 06:58)  Glucose, Serum: 144 mg/dL ( @ 06:27)  Glucose, Serum: 160 mg/dL ( @ 15:56)  Glucose, Serum: 111 mg/dL ( @ 07:20)  Glucose, Serum: 114 mg/dL ( @ 06:50)      23 Sep 2017 19:27    135    |  101    |  54     ----------------------------<  160    4.1     |  20     |  2.20   23 Sep 2017 06:35    137    |  101    |  42     ----------------------------<  131    3.6     |  24     |  1.34   22 Sep 2017 06:52    137    |  103    |  44     ----------------------------<  111    3.8     |  25     |  1.19   21 Sep 2017 07:32    138    |  103    |  43     ----------------------------<  108    3.4     |  26     |  1.37   20 Sep 2017 20:18    135    |  103    |  40     ----------------------------<  126    3.5     |  22     |  1.43   20 Sep 2017 06:58    138    |  103    |  31     ----------------------------<  136    4.2     |  24     |  1.72   19 Sep 2017 06:27    140    |  105    |  24     ----------------------------<  144    4.3     |  25     |  1.46     Ca    7.9        23 Sep 2017 19:27  Ca    8.3        23 Sep 2017 06:35  Ca    8.1        22 Sep 2017 06:52  Ca    8.1        21 Sep 2017 07:32  Ca    8.3        20 Sep 2017 20:18  Ca    8.5        20 Sep 2017 06:58  Ca    8.3        19 Sep 2017 06:27  Phos  4.0       23 Sep 2017 06:35  Phos  3.0       22 Sep 2017 06:52  Phos  2.5       21 Sep 2017 07:32  Phos  1.6       20 Sep 2017 20:18  Phos  2.4       20 Sep 2017 06:58  Phos  3.1       18 Sep 2017 07:20  Phos  3.3       17 Sep 2017 06:50  Mg     2.4       23 Sep 2017 06:35  Mg     2.6       22 Sep 2017 06:52  Mg     2.5       21 Sep 2017 07:32  Mg     2.4       20 Sep 2017 20:18  Mg     2.7       20 Sep 2017 06:58  Mg     2.4       18 Sep 2017 07:20  Mg     2.3       17 Sep 2017 06:50    TPro  6.6    /  Alb  2.5    /  TBili  1.7    /  DBili  x      /  AST  95     /  ALT  92     /  AlkPhos  390    23 Sep 2017 06:35  TPro  6.5    /  Alb  2.6    /  TBili  1.0    /  DBili  x      /  AST  28     /  ALT  35     /  AlkPhos  72     22 Sep 2017 06:52  TPro  6.5    /  Alb  2.6    /  TBili  1.0    /  DBili  .30    /  AST  32     /  ALT  40     /  AlkPhos  75     21 Sep 2017 07:32  TPro  7.1    /  Alb  3.0    /  TBili  1.5    /  DBili  .41    /  AST  46     /  ALT  59     /  AlkPhos  86     20 Sep 2017 06:58  TPro  6.8    /  Alb  3.0    /  TBili  1.7    /  DBili  x      /  AST  86     /  ALT  95     /  AlkPhos  98     19 Sep 2017 06:27  TPro  7.1    /  Alb  3.2    /  TBili  1.1    /  DBili  x      /  AST  27     /  ALT  99     /  AlkPhos  131    17 Sep 2017 06:50          Urinalysis Basic - ( 23 Sep 2017 11:13 )    Color: Yellow / Appearance: Clear / S.015 / pH: x  Gluc: x / Ketone: Negative  / Bili: Small / Urobili: 4 mg/dL   Blood: x / Protein: 30 mg/dL / Nitrite: Negative   Leuk Esterase: Trace / RBC: 3-5 /HPF / WBC 3-5   Sq Epi: x / Non Sq Epi: x / Bacteria: Few        CAPILLARY BLOOD GLUCOSE  160 (23 Sep 2017 16:46)  143 (23 Sep 2017 11:25)  144 (23 Sep 2017 07:46)  147 (22 Sep 2017 22:36)              RADIOLOGY & ADDITIONAL TESTS:    Imaging Personally Reviewed:  [ ] YES  [ ] NO    Consultant(s) Notes Reviewed:  [ ] YES  [ ] NO    Care Discussed with Consultants/Other Providers [ ] YES  [ ] NO

## 2017-09-24 LAB
-  K. PNEUMONIAE GROUP: SIGNIFICANT CHANGE UP
ALBUMIN SERPL ELPH-MCNC: 2.2 G/DL — LOW (ref 3.3–5)
ALP SERPL-CCNC: 253 U/L — HIGH (ref 40–120)
ALT FLD-CCNC: 86 U/L — HIGH (ref 12–78)
ANION GAP SERPL CALC-SCNC: 13 MMOL/L — SIGNIFICANT CHANGE UP (ref 5–17)
AST SERPL-CCNC: 67 U/L — HIGH (ref 15–37)
BASOPHILS # BLD AUTO: 0.1 K/UL — SIGNIFICANT CHANGE UP (ref 0–0.2)
BASOPHILS # BLD AUTO: 0.1 K/UL — SIGNIFICANT CHANGE UP (ref 0–0.2)
BASOPHILS NFR BLD AUTO: 0.8 % — SIGNIFICANT CHANGE UP (ref 0–2)
BASOPHILS NFR BLD AUTO: 0.9 % — SIGNIFICANT CHANGE UP (ref 0–2)
BILIRUB DIRECT SERPL-MCNC: 1.13 MG/DL — HIGH (ref 0.05–0.2)
BILIRUB INDIRECT FLD-MCNC: 1 MG/DL — SIGNIFICANT CHANGE UP (ref 0.2–1)
BILIRUB SERPL-MCNC: 2.1 MG/DL — HIGH (ref 0.2–1.2)
BUN SERPL-MCNC: 65 MG/DL — HIGH (ref 7–23)
CALCIUM SERPL-MCNC: 7.8 MG/DL — LOW (ref 8.5–10.1)
CHLORIDE SERPL-SCNC: 101 MMOL/L — SIGNIFICANT CHANGE UP (ref 96–108)
CO2 SERPL-SCNC: 21 MMOL/L — LOW (ref 22–31)
CREAT SERPL-MCNC: 2.58 MG/DL — HIGH (ref 0.5–1.3)
EOSINOPHIL # BLD AUTO: 0.1 K/UL — SIGNIFICANT CHANGE UP (ref 0–0.5)
EOSINOPHIL # BLD AUTO: 0.3 K/UL — SIGNIFICANT CHANGE UP (ref 0–0.5)
EOSINOPHIL NFR BLD AUTO: 0.9 % — SIGNIFICANT CHANGE UP (ref 0–6)
EOSINOPHIL NFR BLD AUTO: 2 % — SIGNIFICANT CHANGE UP (ref 0–6)
EOSINOPHIL NFR URNS MANUAL: NEGATIVE — SIGNIFICANT CHANGE UP
GLUCOSE SERPL-MCNC: 114 MG/DL — HIGH (ref 70–99)
HCT VFR BLD CALC: 31.5 % — LOW (ref 39–50)
HCT VFR BLD CALC: 31.8 % — LOW (ref 39–50)
HGB BLD-MCNC: 10.7 G/DL — LOW (ref 13–17)
HGB BLD-MCNC: 10.8 G/DL — LOW (ref 13–17)
LACTATE SERPL-SCNC: 1.4 MMOL/L — SIGNIFICANT CHANGE UP (ref 0.7–2)
LACTATE SERPL-SCNC: 1.5 MMOL/L — SIGNIFICANT CHANGE UP (ref 0.7–2)
LYMPHOCYTES # BLD AUTO: 0.3 K/UL — LOW (ref 1–3.3)
LYMPHOCYTES # BLD AUTO: 0.6 K/UL — LOW (ref 1–3.3)
LYMPHOCYTES # BLD AUTO: 2.5 % — LOW (ref 13–44)
LYMPHOCYTES # BLD AUTO: 4.2 % — LOW (ref 13–44)
MAGNESIUM SERPL-MCNC: 2.4 MG/DL — SIGNIFICANT CHANGE UP (ref 1.6–2.6)
MCHC RBC-ENTMCNC: 32.1 PG — SIGNIFICANT CHANGE UP (ref 27–34)
MCHC RBC-ENTMCNC: 32.6 PG — SIGNIFICANT CHANGE UP (ref 27–34)
MCHC RBC-ENTMCNC: 34 GM/DL — SIGNIFICANT CHANGE UP (ref 32–36)
MCHC RBC-ENTMCNC: 34 GM/DL — SIGNIFICANT CHANGE UP (ref 32–36)
MCV RBC AUTO: 94.5 FL — SIGNIFICANT CHANGE UP (ref 80–100)
MCV RBC AUTO: 95.8 FL — SIGNIFICANT CHANGE UP (ref 80–100)
METHOD TYPE: SIGNIFICANT CHANGE UP
MONOCYTES # BLD AUTO: 0.9 K/UL — SIGNIFICANT CHANGE UP (ref 0–0.9)
MONOCYTES # BLD AUTO: 0.9 K/UL — SIGNIFICANT CHANGE UP (ref 0–0.9)
MONOCYTES NFR BLD AUTO: 6.6 % — SIGNIFICANT CHANGE UP (ref 2–14)
MONOCYTES NFR BLD AUTO: 7 % — SIGNIFICANT CHANGE UP (ref 2–14)
NEUTROPHILS # BLD AUTO: 11.3 K/UL — HIGH (ref 1.8–7.4)
NEUTROPHILS # BLD AUTO: 12.1 K/UL — HIGH (ref 1.8–7.4)
NEUTROPHILS NFR BLD AUTO: 87.6 % — HIGH (ref 43–77)
NEUTROPHILS NFR BLD AUTO: 87.6 % — HIGH (ref 43–77)
PHOSPHATE SERPL-MCNC: 4 MG/DL — SIGNIFICANT CHANGE UP (ref 2.5–4.5)
PLATELET # BLD AUTO: 127 K/UL — LOW (ref 150–400)
PLATELET # BLD AUTO: 144 K/UL — LOW (ref 150–400)
POTASSIUM SERPL-MCNC: 3.8 MMOL/L — SIGNIFICANT CHANGE UP (ref 3.5–5.3)
POTASSIUM SERPL-SCNC: 3.8 MMOL/L — SIGNIFICANT CHANGE UP (ref 3.5–5.3)
PROT SERPL-MCNC: 6.1 GM/DL — SIGNIFICANT CHANGE UP (ref 6–8.3)
RBC # BLD: 3.29 M/UL — LOW (ref 4.2–5.8)
RBC # BLD: 3.36 M/UL — LOW (ref 4.2–5.8)
RBC # FLD: 12.6 % — SIGNIFICANT CHANGE UP (ref 11–15)
RBC # FLD: 13 % — SIGNIFICANT CHANGE UP (ref 11–15)
SODIUM SERPL-SCNC: 135 MMOL/L — SIGNIFICANT CHANGE UP (ref 135–145)
SODIUM UR-SCNC: 20 MMOL/L — SIGNIFICANT CHANGE UP
WBC # BLD: 12.9 K/UL — HIGH (ref 3.8–10.5)
WBC # BLD: 13.8 K/UL — HIGH (ref 3.8–10.5)
WBC # FLD AUTO: 12.9 K/UL — HIGH (ref 3.8–10.5)
WBC # FLD AUTO: 13.8 K/UL — HIGH (ref 3.8–10.5)

## 2017-09-24 RX ORDER — SODIUM CHLORIDE 9 MG/ML
2000 INJECTION INTRAMUSCULAR; INTRAVENOUS; SUBCUTANEOUS ONCE
Qty: 0 | Refills: 0 | Status: COMPLETED | OUTPATIENT
Start: 2017-09-24 | End: 2017-09-24

## 2017-09-24 RX ORDER — ELECTROLYTE SOLUTION,INJ
1 VIAL (ML) INTRAVENOUS
Qty: 0 | Refills: 0 | Status: DISCONTINUED | OUTPATIENT
Start: 2017-09-24 | End: 2017-09-24

## 2017-09-24 RX ORDER — SODIUM CHLORIDE 9 MG/ML
1000 INJECTION INTRAMUSCULAR; INTRAVENOUS; SUBCUTANEOUS
Qty: 0 | Refills: 0 | Status: DISCONTINUED | OUTPATIENT
Start: 2017-09-24 | End: 2017-09-26

## 2017-09-24 RX ADMIN — HEPARIN SODIUM 5000 UNIT(S): 5000 INJECTION INTRAVENOUS; SUBCUTANEOUS at 05:43

## 2017-09-24 RX ADMIN — HEPARIN SODIUM 5000 UNIT(S): 5000 INJECTION INTRAVENOUS; SUBCUTANEOUS at 18:42

## 2017-09-24 RX ADMIN — SODIUM CHLORIDE 80 MILLILITER(S): 9 INJECTION INTRAMUSCULAR; INTRAVENOUS; SUBCUTANEOUS at 16:48

## 2017-09-24 RX ADMIN — PIPERACILLIN AND TAZOBACTAM 25 GRAM(S): 4; .5 INJECTION, POWDER, LYOPHILIZED, FOR SOLUTION INTRAVENOUS at 22:20

## 2017-09-24 RX ADMIN — Medication 1 EACH: at 22:20

## 2017-09-24 RX ADMIN — PANTOPRAZOLE SODIUM 40 MILLIGRAM(S): 20 TABLET, DELAYED RELEASE ORAL at 13:13

## 2017-09-24 RX ADMIN — PIPERACILLIN AND TAZOBACTAM 25 GRAM(S): 4; .5 INJECTION, POWDER, LYOPHILIZED, FOR SOLUTION INTRAVENOUS at 13:13

## 2017-09-24 RX ADMIN — SODIUM CHLORIDE 333.33 MILLILITER(S): 9 INJECTION INTRAMUSCULAR; INTRAVENOUS; SUBCUTANEOUS at 10:11

## 2017-09-24 RX ADMIN — PIPERACILLIN AND TAZOBACTAM 25 GRAM(S): 4; .5 INJECTION, POWDER, LYOPHILIZED, FOR SOLUTION INTRAVENOUS at 05:43

## 2017-09-24 NOTE — PROGRESS NOTE ADULT - SUBJECTIVE AND OBJECTIVE BOX
Assessment:    H/O CAD  Last cath 2015 at that time medical therapy was recommended  Echo 2015 EF 25-30% is chronic and well managed with medical therapy  Troponin negative  Chronic systolic CHF is noted from chart with EF 25%, AICD already in place  Acute Ruthy, stable post cholecystectomy, surgical f/u noted  Hypotension today, fever to 101, WBC to 16  Bolus 500cc, another 500-1000 warranted, SBP improved  Likely infection

## 2017-09-24 NOTE — CONSULT NOTE ADULT - CONSULT REQUESTED DATE/TIME
14-Sep-2017 08:54
14-Sep-2017 18:54
14-Sep-2017 19:27
15-Sep-2017 14:46
19-Sep-2017 11:33
24-Sep-2017
20-Sep-2017

## 2017-09-24 NOTE — CONSULT NOTE ADULT - SUBJECTIVE AND OBJECTIVE BOX
HPI:  83 y/o male PMHx AICD/pacemaker, HTN, HLD, CAD/MI, DM, EF of 25%, acid reflux, BPH with intermittent epigastric discomfort with food intake lasting for 1-2 hours per day over past three days. Slight nausea earlier yesterday. Denies abdominal pain or discomfort now. Last BM was yesterday and normal. Patient last ate at 4pm. No hx colonoscopy/endoscopy. Patient denies fever, chills, nausea, vomiting, constipation, diarrhea, hematuria, melena, hematochezia, chest pain, shortness of breath, dizziness, palpitations. Patient denies prior incident. (14 Sep 2017 03:20)      PAST MEDICAL & SURGICAL HISTORY:  Pneumonia: hospitliazed two years ago on IV antibiotics for 10 days  CAD (coronary artery disease): last cath done in - (no intervention, locations of lesions unknown)  Myocardial infarct: in 1991  BPH (Benign Prostatic Hyperplasia)  Diabetes mellitus type 2, diet-controlled: diagnosed two years, diet controlled  Dyslipidemia  Hypertension  H/O prostate biopsy: During prostate biposy, a vessel was hit and wouldn&#x27;t stop bleeding so patient had emergency prostate surgery. Exact procedure unknown by patient.  Skin lesion of right leg: with biopsy - benign      SOCHX:   tobacco,  -  alcohol    FMHX: FA/MO  - contributory       Recent Travel:    Immunizations:    Allergies    No Known Allergies    Intolerances        MEDICATIONS  (STANDING):  heparin  Injectable 5000 Unit(s) SubCutaneous every 12 hours  insulin lispro (HumaLOG) corrective regimen sliding scale   SubCutaneous three times a day before meals  insulin lispro (HumaLOG) corrective regimen sliding scale   SubCutaneous at bedtime  dextrose 50% Injectable 12.5 Gram(s) IV Push once  dextrose 50% Injectable 25 Gram(s) IV Push once  dextrose 50% Injectable 25 Gram(s) IV Push once  pantoprazole  Injectable 40 milliGRAM(s) IV Push daily  piperacillin/tazobactam IVPB. 3.375 Gram(s) IV Intermittent every 8 hours  Parenteral Nutrition - Adult 1 Each (83 mL/Hr) TPN Continuous <Continuous>  Parenteral Nutrition - Adult 1 Each (83 mL/Hr) TPN Continuous <Continuous>  sodium chloride 0.9%. 1000 milliLiter(s) (80 mL/Hr) IV Continuous <Continuous>    MEDICATIONS  (PRN):  ondansetron Injectable 4 milliGRAM(s) IV Push every 6 hours PRN Nausea  dextrose Gel 1 Dose(s) Oral once PRN Blood Glucose LESS THAN 70 milliGRAM(s)/deciliter  glucagon  Injectable 1 milliGRAM(s) IntraMuscular once PRN Glucose LESS THAN 70 milligrams/deciliter  acetaminophen   Tablet 650 milliGRAM(s) Oral every 6 hours PRN For Temp greater than 38 C (100.4 F)  hydrALAZINE Injectable 10 milliGRAM(s) IV Push every 6 hours PRN onl;y for SBP greater than 160      REVIEW OF SYSTEMS:  CONSTITUTIONAL: No fever, weight loss, or fatigue  EYES: No eye pain, visual disturbances, or discharge  ENMT:  No difficulty hearing, tinnitus, vertigo; No sinus or throat pain  NECK: No pain or stiffness  BREASTS: No pain, masses, or nipple discharge  RESPIRATORY: No cough, wheezing, chills or hemoptysis; No shortness of breath  CARDIOVASCULAR: No chest pain, palpitations, dizziness, or leg swelling  GASTROINTESTINAL: No abdominal or epigastric pain. No nausea, vomiting, or hematemesis; No diarrhea or constipation. No melena or hematochezia.  GENITOURINARY: No dysuria, frequency, hematuria, or incontinence  NEUROLOGICAL: No headaches, memory loss, loss of strength, numbness, or tremors  SKIN: No itching, burning, rashes, or lesions   LYMPH NODES: No enlarged glands  ENDOCRINE: No heat or cold intolerance; No hair loss  MUSCULOSKELETAL: No joint pain or swelling; No muscle, back, or extremity pain  PSYCHIATRIC: No depression, anxiety, mood swings, or difficulty sleeping  HEME/LYMPH: No easy bruising, or bleeding gums  ALLERGY AND IMMUNOLOGIC: No hives or eczema    VITAL SIGNS:    T(C): 37 (17 @ 12:02), Max: 37.7 (17 @ 17:17)  T(F): 98.6 (17 @ 12:02), Max: 99.8 (17 @ 17:17)  HR: 72 (17 @ 12:02) (64 - 83)  BP: 110/64 (17 @ 12:02) (84/40 - 110/64)  RR: 18 (17 @ 12:02) (18 - 18)  SpO2: 96% (17 @ 12:02) (94% - 96%)    PHYSICAL EXAM:    GENERAL: NAD, well-groomed, well-developed  HEAD:  Atraumatic, Normocephalic  EYES: EOMI, PERRLA, conjunctiva and sclera clear  ENMT: No tonsillar erythema, exudates, or enlargement; Moist mucous membranes, Good dentition, No lesions  NECK: Supple, No JVD, Normal thyroid  NERVOUS SYSTEM:  Alert & Oriented X3, Good concentration; Motor Strength 5/5 B/L upper and lower extremities; DTRs 2+ intact and symmetric  CHEST/LUNG: Clear to percussion bilaterally; No rales, rhonchi, wheezing bilaterally  HEART: Regular rate and rhythm; No murmurs, rubs, or gallops  ABDOMEN: Soft, Nontender, Nondistended; Bowel sounds present  EXTREMITIES:  2+ Peripheral Pulses, No clubbing, cyanosis, or edema  LYMPH: No lymphadenopathy noted  SKIN: No rashes or lesions  BACK: no pressor sore     LABS:                         10.7   12.9  )-----------( 127      ( 24 Sep 2017 06:03 )             31.5         135  |  101  |  65<H>  ----------------------------<  114<H>  3.8   |  21<L>  |  2.58<H>    Ca    7.8<L>      24 Sep 2017 06:03  Phos  4.0       Mg     2.4         TPro  6.1  /  Alb  2.2<L>  /  TBili  2.1<H>  /  DBili  1.13<H>  /  AST  67<H>  /  ALT  86<H>  /  AlkPhos  253<H>      LIVER FUNCTIONS - ( 24 Sep 2017 06:03 )  Alb: 2.2 g/dL / Pro: 6.1 gm/dL / ALK PHOS: 253 U/L / ALT: 86 U/L / AST: 67 U/L / GGT: x             Urinalysis Basic - ( 23 Sep 2017 11:13 )    Color: Yellow / Appearance: Clear / S.015 / pH: x  Gluc: x / Ketone: Negative  / Bili: Small / Urobili: 4 mg/dL   Blood: x / Protein: 30 mg/dL / Nitrite: Negative   Leuk Esterase: Trace / RBC: 3-5 /HPF / WBC 3-5   Sq Epi: x / Non Sq Epi: x / Bacteria: Few                                        Radiology: HPI:  81 y/o male PMHx AICD/pacemaker, HTN, HLD, CAD/MI, DM, EF of 25%, acid reflux, BPH with intermittent epigastric discomfort with food intake lasting for 1-2 hours per day over past three days. Slight nausea earlier yesterday. Denies abdominal pain or discomfort now. Last BM was yesterday and normal. Patient last ate at 4pm. No hx colonoscopy/endoscopy. Patient denies fever, chills, nausea, vomiting, constipation, diarrhea, hematuria, melena, hematochezia, chest pain, shortness of breath, dizziness, palpitations. Patient denies prior incident. (14 Sep 2017 03:20)      PAST MEDICAL & SURGICAL HISTORY:  Pneumonia: hospitliazed two years ago on IV antibiotics for 10 days  CAD (coronary artery disease): last cath done in - (no intervention, locations of lesions unknown)  Myocardial infarct: in 1991  BPH (Benign Prostatic Hyperplasia)  Diabetes mellitus type 2, diet-controlled: diagnosed two years, diet controlled  Dyslipidemia  Hypertension  H/O prostate biopsy: During prostate biposy, a vessel was hit and wouldn&#x27;t stop bleeding so patient had emergency prostate surgery. Exact procedure unknown by patient.  Skin lesion of right leg: with biopsy - benign      SOCHX:   ex tobacco,  -  no alcohol    FMHX: FA/MO  -non contributory       Recent Travel:    Immunizations:    Allergies    No Known Allergies    Intolerances        MEDICATIONS  (STANDING):  heparin  Injectable 5000 Unit(s) SubCutaneous every 12 hours  insulin lispro (HumaLOG) corrective regimen sliding scale   SubCutaneous three times a day before meals  insulin lispro (HumaLOG) corrective regimen sliding scale   SubCutaneous at bedtime  dextrose 50% Injectable 12.5 Gram(s) IV Push once  dextrose 50% Injectable 25 Gram(s) IV Push once  dextrose 50% Injectable 25 Gram(s) IV Push once  pantoprazole  Injectable 40 milliGRAM(s) IV Push daily  piperacillin/tazobactam IVPB. 3.375 Gram(s) IV Intermittent every 8 hours  Parenteral Nutrition - Adult 1 Each (83 mL/Hr) TPN Continuous <Continuous>  Parenteral Nutrition - Adult 1 Each (83 mL/Hr) TPN Continuous <Continuous>  sodium chloride 0.9%. 1000 milliLiter(s) (80 mL/Hr) IV Continuous <Continuous>    MEDICATIONS  (PRN):  ondansetron Injectable 4 milliGRAM(s) IV Push every 6 hours PRN Nausea  dextrose Gel 1 Dose(s) Oral once PRN Blood Glucose LESS THAN 70 milliGRAM(s)/deciliter  glucagon  Injectable 1 milliGRAM(s) IntraMuscular once PRN Glucose LESS THAN 70 milligrams/deciliter  acetaminophen   Tablet 650 milliGRAM(s) Oral every 6 hours PRN For Temp greater than 38 C (100.4 F)  hydrALAZINE Injectable 10 milliGRAM(s) IV Push every 6 hours PRN onl;y for SBP greater than 160      REVIEW OF SYSTEMS:  CONSTITUTIONAL: No fever, weight loss, or fatigue  EYES: No eye pain, visual disturbances, or discharge  ENMT:  No difficulty hearing, tinnitus, vertigo; No sinus or throat pain  NECK: No pain or stiffness  BREASTS: No pain, masses, or nipple discharge  RESPIRATORY: No cough, wheezing, chills or hemoptysis; No shortness of breath  CARDIOVASCULAR: No chest pain, palpitations, dizziness, or leg swelling  GASTROINTESTINAL:  all resolved now   GENITOURINARY: No dysuria, frequency, hematuria, or incontinence  NEUROLOGICAL: No headaches, memory loss, loss of strength, numbness, or tremors  SKIN: No itching, burning, rashes, or lesions   LYMPH NODES: No enlarged glands  ENDOCRINE: No heat or cold intolerance; No hair loss  MUSCULOSKELETAL: No joint pain or swelling; No muscle, back, or extremity pain  PSYCHIATRIC: No depression, anxiety, mood swings, or difficulty sleeping  HEME/LYMPH: No easy bruising, or bleeding gums  ALLERGY AND IMMUNOLOGIC: No hives or eczema    VITAL SIGNS:    T(C): 37 (17 @ 12:02), Max: 37.7 (17 @ 17:17)  T(F): 98.6 (17 @ 12:02), Max: 99.8 (17 @ 17:17)  HR: 72 (17 @ 12:02) (64 - 83)  BP: 110/64 (17 @ 12:02) (84/40 - 110/64)  RR: 18 (17 @ 12:02) (18 - 18)  SpO2: 96% (17 @ 12:02) (94% - 96%)    PHYSICAL EXAM:    GENERAL: NAD, well-groomed, well-developed; obese  HEAD:  Atraumatic, Normocephalic  EYES: EOMI, PERRLA, conjunctiva and sclera clear  ENMT: ; Moist mucous membranes,   NECK: Supple, No JVD, Normal thyroid  NERVOUS SYSTEM:  Alert & Oriented X3, Good concentration;  CHEST/LUNG: Clear to percussion bilaterally; No rales, rhonchi, wheezing bilaterally  HEART: Regular rate and rhythm; No murmurs, rubs, or gallops  ABDOMEN: Soft, Nontender, Nondistended; Bowel sounds present  no pow infection  EXTREMITIES:  2+ Peripheral Pulses, No clubbing, cyanosis, or edema  LYMPH: No lymphadenopathy noted  SKIN: No rashes or lesions  BACK: no pressor sore     LABS:                         10.7   12.9  )-----------( 127      ( 24 Sep 2017 06:03 )             31.5     -    135  |  101  |  65<H>  ----------------------------<  114<H>  3.8   |  21<L>  |  2.58<H>    Ca    7.8<L>      24 Sep 2017 06:03  Phos  4.0       Mg     2.4         TPro  6.1  /  Alb  2.2<L>  /  TBili  2.1<H>  /  DBili  1.13<H>  /  AST  67<H>  /  ALT  86<H>  /  AlkPhos  253<H>      LIVER FUNCTIONS - ( 24 Sep 2017 06:03 )  Alb: 2.2 g/dL / Pro: 6.1 gm/dL / ALK PHOS: 253 U/L / ALT: 86 U/L / AST: 67 U/L / GGT: x             Urinalysis Basic - ( 23 Sep 2017 11:13 )    Color: Yellow / Appearance: Clear / S.015 / pH: x  Gluc: x / Ketone: Negative  / Bili: Small / Urobili: 4 mg/dL   Blood: x / Protein: 30 mg/dL / Nitrite: Negative   Leuk Esterase: Trace / RBC: 3-5 /HPF / WBC 3-5   Sq Epi: x / Non Sq Epi: x / Bacteria: Few                                        Radiology:

## 2017-09-24 NOTE — PROGRESS NOTE ADULT - ASSESSMENT
s/p open cholecystectomy , hx of chf, AICD. bacteremia, / cholangitis. responding to IV Zosyn, . now has ATN ( MALATHI).

## 2017-09-24 NOTE — PROGRESS NOTE ADULT - SUBJECTIVE AND OBJECTIVE BOX
SURGERY PROGRESS HPI:  Pt seen and examined at bedside. Denies pain and complaints. Pt tolerating ice chips. Pt denies nausea and vomiting. 1 BM overnight. States he has had 3 BMs total post-op. +BM/flatus. Voiding well. Pt denies chest pain, SOB, dizziness, palpitations, fever, chills. Ambulated to the bathroom yesterday, Says PT did not come yesterday.    Vitals at bedside: Temp: 97.8, HR: 74, BP: 97/53, RR:18, spO2:96 on room air    EXAM:  GENERAL: Alert, NAD  CHEST/LUNG: Clear to auscultation bilaterally, respirations nonlabored  HEART: S1S2, Regular rate and rhythm;   ABDOMEN: Nondistended. +Bowel sounds, soft, Nontender. RUQ staple line c/d/i with no drainage. RUQ TONY drain with serous output 30cc/24hrs. Umbilical staples c/d/i with small ecchymosis noted to infraumbilical area.   EXTREMITIES:  no calf tenderness, No edema. Left foot erythema, warmth, and swelling resolved.     I&O's Detail    22 Sep 2017 07:01  -  23 Sep 2017 07:00  --------------------------------------------------------  IN:    Fat Emulsion 20%: 151.2 mL    PPN (Peripheral Parenteral Nutrition): 1245 mL    Solution: 100 mL  Total IN: 1496.2 mL    OUT:    Bulb: 65 mL    Nasoenteral Tube: 400 mL    Voided: 300 mL  Total OUT: 765 mL    Total NET: 731.2 mL      23 Sep 2017 07:  -  24 Sep 2017 05:55  --------------------------------------------------------  IN:    0.9% NaCl: 1000 mL    PPN (Peripheral Parenteral Nutrition): 1826 mL    Solution: 300 mL    Solution: 250 mL  Total IN: 3376 mL    OUT:    Bulb: 30 mL    Nasoenteral Tube: 100 mL    Voided: 1150 mL  Total OUT: 1280 mL    Total NET: 2096 mL    LABS:                  10.8   13.8  )-----------( 144      ( 24 Sep 2017 02:23 )             31.8         135  |  101  |  54<H>  ----------------------------<  160<H>  4.1   |  20<L>  |  2.20<H>    Ca    7.9<L>      23 Sep 2017 19:27  Phos  4.0       Mg     2.4         TPro  6.6  /  Alb  2.5<L>  /  TBili  1.7<H>  /  DBili  x   /  AST  95<H>  /  ALT  92<H>  /  AlkPhos  390<H>      Urinalysis Basic - ( 23 Sep 2017 11:13 )    Color: Yellow / Appearance: Clear / S.015 / pH: x  Gluc: x / Ketone: Negative  / Bili: Small / Urobili: 4 mg/dL   Blood: x / Protein: 30 mg/dL / Nitrite: Negative   Leuk Esterase: Trace / RBC: 3-5 /HPF / WBC 3-5   Sq Epi: x / Non Sq Epi: x / Bacteria: Few    Xray Chest 1 View AP/PA. (17 @ 11:11): There is no focal consolidation or pleural effusion, although the studies limited by suboptimal inspiration.  Heart size is within normal limits. There is a left-sided cardiac pacing device in place. The osseous structures are intact.    Xray Kidney Ureter Bladder (17 @ 11:11): There is a surgical drain in the right upper quadrant and surgical staples are seen in the abdominal midline and right upper quadrant. There is a nonspecific bowel gas pattern. There are degenerative changes of the lumbar spine.    Assessment: 82y old Male PMHx Pneumonia, CAD, Myocardial infarct, BPH, Diabetes mellitus type 2, diet-controlled, Dyslipidemia, Hypertension.  s/p laparoscopic converted to open cholecystectomy secondary to acute on chronic cholecystitis POD#6.   +BM/flatus. NGT fell out . Fever . Zosyn resumed .  Two 500ml boluses given yesterday. Hypotension improving. WBC:13.8. Leukocytosis improving. Lactate: 1.4. Afebrile.     Plan:  - Continue abx. Awaiting ID consult   - Follow up blood cx, urine cx, trend WBC  - Strict I&O's   - continue PPN  - hold BP meds  - local wound care  - continue TONY drain care, monitor output  - pain control prn, incentive spirometer, OOB, ambulation  - continue to monitor vital signs closely  - continue medical, GI, cardio consults input  - Will discuss with Dr. Sheffield.

## 2017-09-24 NOTE — PROVIDER CONTACT NOTE (CRITICAL VALUE NOTIFICATION) - ACTION/TREATMENT ORDERED:
2000 IV bolus and will have continuous IVF at 80ml hr after. will continue with prescribed zosyn IV abx as per MD.

## 2017-09-24 NOTE — CONSULT NOTE ADULT - CONSULT REASON
ABDOMINAL PAIN ELEVATED LFTS
Gall Bladder Neoplasm
bacteremia
medical management of surgical patient with multiple comorbidities
medical management of surgical patient with multiple comorbidities
pre op
post op follow up of patient with HTn, HLD AICD, hx of ca prostrate. patient had open laparotomy for cholecytectomy yesterday, ca is suspected.

## 2017-09-24 NOTE — PROGRESS NOTE ADULT - SUBJECTIVE AND OBJECTIVE BOX
Patient is a 82y old  Male who presents with a chief complaint of intermittent epigastric discomfort with food intake since Monday (14 Sep 2017 03:20)      HPI:  83 y/o male PMHx AICD/pacemaker, HTN, HLD, CAD/MI, DM, EF of 25%, acid reflux, BPH with intermittent epigastric discomfort with food intake lasting for 1-2 hours per day over past three days. Slight nausea earlier yesterday. Denies abdominal pain or discomfort now. Last BM was yesterday and normal. Patient last ate at 4pm. No hx colonoscopy/endoscopy. Patient denies fever, chills, nausea, vomiting, constipation, diarrhea, hematuria, melena, hematochezia, chest pain, shortness of breath, dizziness, palpitations. Patient denies prior incident. (14 Sep 2017 03:20)      INTERVAL HPI/OVERNIGHT EVENTS:  The patient denies melena, hematochezia, hematemesis, nausea, vomiting, abdominal pain, constipation, diarrhea, or change in bowel movements     MEDICATIONS  (STANDING):  heparin  Injectable 5000 Unit(s) SubCutaneous every 12 hours  insulin lispro (HumaLOG) corrective regimen sliding scale   SubCutaneous three times a day before meals  insulin lispro (HumaLOG) corrective regimen sliding scale   SubCutaneous at bedtime  dextrose 50% Injectable 12.5 Gram(s) IV Push once  dextrose 50% Injectable 25 Gram(s) IV Push once  dextrose 50% Injectable 25 Gram(s) IV Push once  pantoprazole  Injectable 40 milliGRAM(s) IV Push daily  piperacillin/tazobactam IVPB. 3.375 Gram(s) IV Intermittent every 8 hours  Parenteral Nutrition - Adult 1 Each (83 mL/Hr) TPN Continuous <Continuous>  Parenteral Nutrition - Adult 1 Each (83 mL/Hr) TPN Continuous <Continuous>  sodium chloride 0.9%. 1000 milliLiter(s) (80 mL/Hr) IV Continuous <Continuous>    MEDICATIONS  (PRN):  ondansetron Injectable 4 milliGRAM(s) IV Push every 6 hours PRN Nausea  dextrose Gel 1 Dose(s) Oral once PRN Blood Glucose LESS THAN 70 milliGRAM(s)/deciliter  glucagon  Injectable 1 milliGRAM(s) IntraMuscular once PRN Glucose LESS THAN 70 milligrams/deciliter  acetaminophen   Tablet 650 milliGRAM(s) Oral every 6 hours PRN For Temp greater than 38 C (100.4 F)  hydrALAZINE Injectable 10 milliGRAM(s) IV Push every 6 hours PRN onl;y for SBP greater than 160      FAMILY HISTORY:  No pertinent family history in first degree relatives      Allergies    No Known Allergies    Intolerances        PMH/PSH:  Pneumonia  CAD (coronary artery disease)  Myocardial infarct  BPH (Benign Prostatic Hyperplasia)  Diabetes mellitus type 2, diet-controlled  Dyslipidemia  Hypertension  H/O prostate biopsy  Skin lesion of right leg        REVIEW OF SYSTEMS:  CONSTITUTIONAL: No fever, weight loss, or fatigue  EYES: No eye pain, visual disturbances, or discharge  ENMT:  No difficulty hearing, tinnitus, vertigo; No sinus or throat pain  NECK: No pain or stiffness  BREASTS: No pain, masses, or nipple discharge  RESPIRATORY: No cough, wheezing, chills or hemoptysis; No shortness of breath  CARDIOVASCULAR: No chest pain, palpitations, dizziness, or leg swelling  GASTROINTESTINAL: No abdominal or epigastric pain. No nausea, vomiting, or hematemesis; No diarrhea or constipation. No melena or hematochezia.  GENITOURINARY: No dysuria, frequency, hematuria, or incontinence  NEUROLOGICAL: No headaches, memory loss, loss of strength, numbness, or tremors  SKIN: No itching, burning, rashes, or lesions   LYMPH NODES: No enlarged glands  ENDOCRINE: No heat or cold intolerance; No hair loss  MUSCULOSKELETAL: No joint pain or swelling; No muscle, back, or extremity pain  PSYCHIATRIC: No depression, anxiety, mood swings, or difficulty sleeping  HEME/LYMPH: No easy bruising, or bleeding gums  ALLERGY AND IMMUNOLOGIC: No hives or eczema    Vital Signs Last 24 Hrs  T(C): 37 (24 Sep 2017 12:02), Max: 37.7 (23 Sep 2017 17:17)  T(F): 98.6 (24 Sep 2017 12:02), Max: 99.8 (23 Sep 2017 17:17)  HR: 72 (24 Sep 2017 12:02) (64 - 83)  BP: 110/64 (24 Sep 2017 12:02) (84/40 - 110/64)  BP(mean): --  RR: 18 (24 Sep 2017 12:02) (18 - 18)  SpO2: 96% (24 Sep 2017 12:02) (94% - 96%)    PHYSICAL EXAM:  GENERAL: NAD, well-groomed, well-developed  HEAD:  Atraumatic, Normocephalic  EYES: EOMI, PERRLA, conjunctiva and sclera clear  ENMT: No tonsillar erythema, exudates, or enlargement; Moist mucous membranes, Good dentition, No lesions  NECK: Supple, No JVD, Normal thyroid  NERVOUS SYSTEM:  Alert & Oriented X3, Good concentration; Motor Strength 5/5 B/L upper and lower extremities; DTRs 2+ intact and symmetric  CHEST/LUNG: Clear to percussion bilaterally; No rales, rhonchi, wheezing, or rubs  HEART: Regular rate and rhythm; No murmurs, rubs, or gallops  ABDOMEN: Soft, Nontender, Nondistended; Bowel sounds present  EXTREMITIES:  2+ Peripheral Pulses, No clubbing, cyanosis, or edema  LYMPH: No lymphadenopathy noted  SKIN: No rashes or lesions    LAB                          10.7   12.9  )-----------( 127      ( 24 Sep 2017 06:03 )             31.5       CBC:   @ 06:03  WBC 12.9   Hgb 10.7   Hct 31.5   Plts 127  MCV 95.8   @ 02:23  WBC 13.8   Hgb 10.8   Hct 31.8   Plts 144  MCV 94.5   @ 19:27  WBC 16.4   Hgb 10.6   Hct 31.3   Plts 171  MCV 93.0   @ 06:35  WBC 10.5   Hgb 11.7   Hct 33.7   Plts 188  MCV 93.3   @ 06:52  WBC 8.7   Hgb 11.8   Hct 35.4   Plts 174  MCV 95.8   @ 07:32  WBC 10.0   Hgb 10.7   Hct 33.0   Plts 158  MCV 93.4   @ 20:18  WBC 11.8   Hgb 12.9   Hct 38.6   Plts 159  MCV 95.1   @ 06:58  WBC 12.5   Hgb 13.4   Hct 41.6   Plts 187  MCV 98.1   @ 06:27  WBC 10.9   Hgb 14.4   Hct 43.0   Plts 187  MCV 93.6   @ 15:56  WBC 9.0   Hgb 14.9   Hct 45.5   Plts 175  MCV 97.2      Chemistry:   @ 06:03  Na+ 135  K+ 3.8  Cl- 101  CO2 21  BUN 65  Cr 2.58      @ 19:27  Na+ 135  K+ 4.1  Cl- 101  CO2 20  BUN 54  Cr 2.20      @ 06:35  Na+ 137  K+ 3.6  Cl- 101  CO2 24  BUN 42  Cr 1.34      @ 06:52  Na+ 137  K+ 3.8  Cl- 103  CO2 25  BUN 44  Cr 1.19      @ 07:32  Na+ 138  K+ 3.4  Cl- 103  CO2 26  BUN 43  Cr 1.37      @ 20:18  Na+ 135  K+ 3.5  Cl- 103  CO2 22  BUN 40  Cr 1.43      @ 06:58  Na+ 138  K+ 4.2  Cl- 103  CO2 24  BUN 31  Cr 1.72      @ 06:27  Na+ 140  K+ 4.3  Cl- 105  CO2 25  BUN 24  Cr 1.46      @ 15:56  Na+ 140  K+ 4.7  Cl- 107  CO2 24  BUN 19  Cr 1.48      @ 07:20  Na+ 140  K+ 4.0  Cl- 105  CO2 24  BUN 19  Cr 1.43         Glucose, Serum: 114 mg/dL ( @ 06:03)  Glucose, Serum: 160 mg/dL ( @ 19:27)  Glucose, Serum: 131 mg/dL ( @ 06:35)  Glucose, Serum: 111 mg/dL ( @ 06:52)  Glucose, Serum: 108 mg/dL ( @ 07:32)  Glucose, Serum: 126 mg/dL ( @ 20:18)  Glucose, Serum: 136 mg/dL ( @ 06:58)  Glucose, Serum: 144 mg/dL ( @ 06:27)  Glucose, Serum: 160 mg/dL ( @ 15:56)  Glucose, Serum: 111 mg/dL ( @ 07:20)      24 Sep 2017 06:03    135    |  101    |  65     ----------------------------<  114    3.8     |  21     |  2.58   23 Sep 2017 19:27    135    |  101    |  54     ----------------------------<  160    4.1     |  20     |  2.20   23 Sep 2017 06:35    137    |  101    |  42     ----------------------------<  131    3.6     |  24     |  1.34   22 Sep 2017 06:52    137    |  103    |  44     ----------------------------<  111    3.8     |  25     |  1.19   21 Sep 2017 07:32    138    |  103    |  43     ----------------------------<  108    3.4     |  26     |  1.37   20 Sep 2017 20:18    135    |  103    |  40     ----------------------------<  126    3.5     |  22     |  1.43   20 Sep 2017 06:58    138    |  103    |  31     ----------------------------<  136    4.2     |  24     |  1.72     Ca    7.8        24 Sep 2017 06:03  Ca    7.9        23 Sep 2017 19:27  Ca    8.3        23 Sep 2017 06:35  Ca    8.1        22 Sep 2017 06:52  Ca    8.1        21 Sep 2017 07:32  Ca    8.3        20 Sep 2017 20:18  Ca    8.5        20 Sep 2017 06:58  Phos  4.0       24 Sep 2017 06:03  Phos  4.0       23 Sep 2017 06:35  Phos  3.0       22 Sep 2017 06:52  Phos  2.5       21 Sep 2017 07:32  Phos  1.6       20 Sep 2017 20:18  Phos  2.4       20 Sep 2017 06:58  Phos  3.1       18 Sep 2017 07:20  Mg     2.4       24 Sep 2017 06:03  Mg     2.4       23 Sep 2017 06:35  Mg     2.6       22 Sep 2017 06:52  Mg     2.5       21 Sep 2017 07:32  Mg     2.4       20 Sep 2017 20:18  Mg     2.7       20 Sep 2017 06:58  Mg     2.4       18 Sep 2017 07:20    TPro  6.1    /  Alb  2.2    /  TBili  2.1    /  DBili  1.13   /  AST  67     /  ALT  86     /  AlkPhos  253    24 Sep 2017 06:03  TPro  6.6    /  Alb  2.5    /  TBili  1.7    /  DBili  x      /  AST  95     /  ALT  92     /  AlkPhos  390    23 Sep 2017 06:35  TPro  6.5    /  Alb  2.6    /  TBili  1.0    /  DBili  x      /  AST  28     /  ALT  35     /  AlkPhos  72     22 Sep 2017 06:52  TPro  6.5    /  Alb  2.6    /  TBili  1.0    /  DBili  .30    /  AST  32     /  ALT  40     /  AlkPhos  75     21 Sep 2017 07:32  TPro  7.1    /  Alb  3.0    /  TBili  1.5    /  DBili  .41    /  AST  46     /  ALT  59     /  AlkPhos  86     20 Sep 2017 06:58  TPro  6.8    /  Alb  3.0    /  TBili  1.7    /  DBili  x      /  AST  86     /  ALT  95     /  AlkPhos  98     19 Sep 2017 06:27          Urinalysis Basic - ( 23 Sep 2017 11:13 )    Color: Yellow / Appearance: Clear / S.015 / pH: x  Gluc: x / Ketone: Negative  / Bili: Small / Urobili: 4 mg/dL   Blood: x / Protein: 30 mg/dL / Nitrite: Negative   Leuk Esterase: Trace / RBC: 3-5 /HPF / WBC 3-5   Sq Epi: x / Non Sq Epi: x / Bacteria: Few        CAPILLARY BLOOD GLUCOSE  133 (24 Sep 2017 10:55)  114 (24 Sep 2017 05:48)  139 (23 Sep 2017 23:08)  160 (23 Sep 2017 16:46)              RADIOLOGY & ADDITIONAL TESTS:    Imaging Personally Reviewed:  [ ] YES  [ ] NO    Consultant(s) Notes Reviewed:  [ ] YES  [ ] NO    Care Discussed with Consultants/Other Providers [ ] YES  [ ] NO

## 2017-09-24 NOTE — CONSULT NOTE ADULT - ASSESSMENT
81 y/o male with multiple comorbidities that is admitted for acute cholecystitis r/o gallbladder neoplastic process    Admitted to surgical service  RCRI risk Class IV which carries an 11% cardiac risk for surgery
83 y/o male with multiple comorbidities that is admitted for acute cholecystitis r/o gallbladder neoplastic process    Admitted to surgical service  RCRI risk Class IV which carries an 11% cardiac risk for surgery
ELEVATED LFT'S WITH ABNORMAL CT SCAN ? GB MASS
kleb bacteremia from bad cholecystitis ;; sp sx   or findings discussed with dr frederick   await blood culture of gram negative rods in blood   stable on zosyn   agree   will follow with you thanks
s/p open cholecystectomy, , post op pain, CAD, HTN, AICD, s/p prostrate ca.   No evidence of acute infection now.

## 2017-09-24 NOTE — PROGRESS NOTE ADULT - SUBJECTIVE AND OBJECTIVE BOX
Patient is a 82y old  Male who presents with a chief complaint of intermittent epigastric discomfort with food intake since Monday (14 Sep 2017 03:20)  Blood cultures positive for klebsiella. Patient has  ATN and is getting fluids and is on IV Zosyn. No fever today, WBC count improving   Hepatic function abnormal.. chest x-ray neg for PNA    INTERVAL HPI/OVERNIGHT EVENTS: improved from yesterday. only minimal cough. [pain at surgical site , minimal, has BMs.     MEDICATIONS  (STANDING):  heparin  Injectable 5000 Unit(s) SubCutaneous every 12 hours  insulin lispro (HumaLOG) corrective regimen sliding scale   SubCutaneous three times a day before meals  insulin lispro (HumaLOG) corrective regimen sliding scale   SubCutaneous at bedtime  dextrose 50% Injectable 12.5 Gram(s) IV Push once  dextrose 50% Injectable 25 Gram(s) IV Push once  dextrose 50% Injectable 25 Gram(s) IV Push once  pantoprazole  Injectable 40 milliGRAM(s) IV Push daily  piperacillin/tazobactam IVPB. 3.375 Gram(s) IV Intermittent every 8 hours  Parenteral Nutrition - Adult 1 Each (83 mL/Hr) TPN Continuous <Continuous>  Parenteral Nutrition - Adult 1 Each (83 mL/Hr) TPN Continuous <Continuous>  sodium chloride 0.9% Bolus 2000 milliLiter(s) IV Bolus once  sodium chloride 0.9%. 1000 milliLiter(s) (80 mL/Hr) IV Continuous <Continuous>    MEDICATIONS  (PRN):  ondansetron Injectable 4 milliGRAM(s) IV Push every 6 hours PRN Nausea  dextrose Gel 1 Dose(s) Oral once PRN Blood Glucose LESS THAN 70 milliGRAM(s)/deciliter  glucagon  Injectable 1 milliGRAM(s) IntraMuscular once PRN Glucose LESS THAN 70 milligrams/deciliter  acetaminophen   Tablet 650 milliGRAM(s) Oral every 6 hours PRN For Temp greater than 38 C (100.4 F)  hydrALAZINE Injectable 10 milliGRAM(s) IV Push every 6 hours PRN onl;y for SBP greater than 160      Allergies    No Known Allergies    Intolerances        REVIEW OF SYSTEMS:  CONSTITUTIONAL: No fever, weight loss, or fatigue  EYES: No eye pain, visual disturbances, or discharge  ENMT:  No difficulty hearing, tinnitus, vertigo; No sinus or throat pain  NECK: No pain or stiffness  BREASTS: No pain, masses, or nipple discharge  RESPIRATORY: No cough, wheezing, chills or hemoptysis; No shortness of breath  CARDIOVASCULAR: No chest pain, palpitations, dizziness, or leg swelling  GASTROINTESTINAL: No abdominal or epigastric pain. No nausea, vomiting, or hematemesis; No diarrhea or constipation. No melena or hematochezia.  GENITOURINARY: No dysuria, frequency, hematuria, or incontinence  NEUROLOGICAL: No headaches, memory loss, loss of strength, numbness, or tremors  SKIN: No itching, burning, rashes, or lesions   LYMPH NODES: No enlarged glands  ENDOCRINE: No heat or cold intolerance; No hair loss  MUSCULOSKELETAL: No joint pain or swelling; No muscle, back, or extremity pain  PSYCHIATRIC: No depression, anxiety, mood swings, or difficulty sleeping  HEME/LYMPH: No easy bruising, or bleeding gums  ALLERY AND IMMUNOLOGIC: No hives or eczema    Vital Signs Last 24 Hrs  T(C): 36.6 (24 Sep 2017 06:25), Max: 38.3 (23 Sep 2017 11:53)  T(F): 97.8 (24 Sep 2017 06:25), Max: 101 (23 Sep 2017 11:53)  HR: 73 (24 Sep 2017 07:01) (64 - 95)  BP: 97/53 (24 Sep 2017 06:25) (84/40 - 132/61)  BP(mean): --  RR: 18 (24 Sep 2017 06:25) (18 - 18)  SpO2: 96% (24 Sep 2017 06:25) (90% - 96%)    PHYSICAL EXAM:  GENERAL: NAD, well-groomed, well-developed. jaundiced  HEAD:  Atraumatic, Normocephalic  EYES: EOMI, PERRLA, conjunctiva and sclera clear  ENMT: No tonsillar erythema, exudates, or enlargement; Moist mucous membranes, Good dentition, No lesions  NECK: Supple, No JVD, Normal thyroid  NERVOUS SYSTEM:  Alert & Oriented X3, Good concentration; Motor Strength 5/5 B/L upper and lower extremities; DTRs 2+ intact and symmetric  CHEST/LUNG: Clear to percussion bilaterally; No rales, rhonchi, wheezing, or rubs  HEART: Regular rate and rhythm; No murmurs, rubs, or gallops  ABDOMEN: Soft, Nontender, Nondistended; Bowel sounds present  EXTREMITIES:  2+ Peripheral Pulses, No clubbing, cyanosis, or edema  LYMPH: No lymphadenopathy noted  SKIN: No rashes or lesions    LABS:                        10.7   12.9  )-----------( 127      ( 24 Sep 2017 06:03 )             31.5         135  |  101  |  65<H>  ----------------------------<  114<H>  3.8   |  21<L>  |  2.58<H>    Ca    7.8<L>      24 Sep 2017 06:03  Phos  4.0       Mg     2.4         TPro  6.1  /  Alb  2.2<L>  /  TBili  2.1<H>  /  DBili  1.13<H>  /  AST  67<H>  /  ALT  86<H>  /  AlkPhos  253<H>          Urinalysis Basic - ( 23 Sep 2017 11:13 )    Color: Yellow / Appearance: Clear / S.015 / pH: x  Gluc: x / Ketone: Negative  / Bili: Small / Urobili: 4 mg/dL   Blood: x / Protein: 30 mg/dL / Nitrite: Negative   Leuk Esterase: Trace / RBC: 3-5 /HPF / WBC 3-5   Sq Epi: x / Non Sq Epi: x / Bacteria: Few      CAPILLARY BLOOD GLUCOSE  114 (24 Sep 2017 05:48)  139 (23 Sep 2017 23:08)  160 (23 Sep 2017 16:46)  143 (23 Sep 2017 11:25)                    RADIOLOGY & ADDITIONAL TESTS:    Imaging Personally Reviewed:  [ ] YES  [ ] NO    Consultant(s) Notes Reviewed:  [x ] YES  [ ] NO    Care Discussed with Consultants/Other Providers x] YES  [ ] NO    Care discussed with family,         [  ]   yes  [  ]  No    imp:    stable[ ]    unstable[  ]     improving [ x  ]       unchanged  [  ]                Plans:  Continue present plans  [x  ] continue Iv Zosyn. IV fluid Bolus and maintenance               New consult [  ]   specialty  .......               order test[  ]    test name.  labs in am                Discharge Planning  [  ]

## 2017-09-25 LAB
ALBUMIN SERPL ELPH-MCNC: 2.1 G/DL — LOW (ref 3.3–5)
ALP SERPL-CCNC: 199 U/L — HIGH (ref 40–120)
ALT FLD-CCNC: 61 U/L — SIGNIFICANT CHANGE UP (ref 12–78)
ANION GAP SERPL CALC-SCNC: 14 MMOL/L — SIGNIFICANT CHANGE UP (ref 5–17)
AST SERPL-CCNC: 32 U/L — SIGNIFICANT CHANGE UP (ref 15–37)
BILIRUB DIRECT SERPL-MCNC: 0.48 MG/DL — HIGH (ref 0.05–0.2)
BILIRUB INDIRECT FLD-MCNC: 0.4 MG/DL — SIGNIFICANT CHANGE UP (ref 0.2–1)
BILIRUB SERPL-MCNC: 0.9 MG/DL — SIGNIFICANT CHANGE UP (ref 0.2–1.2)
BUN SERPL-MCNC: 52 MG/DL — HIGH (ref 7–23)
CALCIUM SERPL-MCNC: 8.1 MG/DL — LOW (ref 8.5–10.1)
CHLORIDE SERPL-SCNC: 108 MMOL/L — SIGNIFICANT CHANGE UP (ref 96–108)
CO2 SERPL-SCNC: 19 MMOL/L — LOW (ref 22–31)
CREAT SERPL-MCNC: 1.67 MG/DL — HIGH (ref 0.5–1.3)
CULTURE RESULTS: NO GROWTH — SIGNIFICANT CHANGE UP
GLUCOSE SERPL-MCNC: 118 MG/DL — HIGH (ref 70–99)
HCT VFR BLD CALC: 29.8 % — LOW (ref 39–50)
HGB BLD-MCNC: 10.1 G/DL — LOW (ref 13–17)
LIDOCAIN IGE QN: 774 U/L — HIGH (ref 73–393)
MAGNESIUM SERPL-MCNC: 2.6 MG/DL — SIGNIFICANT CHANGE UP (ref 1.6–2.6)
MCHC RBC-ENTMCNC: 31.2 PG — SIGNIFICANT CHANGE UP (ref 27–34)
MCHC RBC-ENTMCNC: 33.9 GM/DL — SIGNIFICANT CHANGE UP (ref 32–36)
MCV RBC AUTO: 92.2 FL — SIGNIFICANT CHANGE UP (ref 80–100)
PHOSPHATE SERPL-MCNC: 2.6 MG/DL — SIGNIFICANT CHANGE UP (ref 2.5–4.5)
PLATELET # BLD AUTO: 178 K/UL — SIGNIFICANT CHANGE UP (ref 150–400)
POTASSIUM SERPL-MCNC: 3.5 MMOL/L — SIGNIFICANT CHANGE UP (ref 3.5–5.3)
POTASSIUM SERPL-SCNC: 3.5 MMOL/L — SIGNIFICANT CHANGE UP (ref 3.5–5.3)
PREALB SERPL-MCNC: 7 MG/DL — LOW (ref 18–45)
PROT SERPL-MCNC: 6.1 GM/DL — SIGNIFICANT CHANGE UP (ref 6–8.3)
RBC # BLD: 3.23 M/UL — LOW (ref 4.2–5.8)
RBC # FLD: 12.7 % — SIGNIFICANT CHANGE UP (ref 11–15)
SODIUM SERPL-SCNC: 141 MMOL/L — SIGNIFICANT CHANGE UP (ref 135–145)
SPECIMEN SOURCE: SIGNIFICANT CHANGE UP
WBC # BLD: 9 K/UL — SIGNIFICANT CHANGE UP (ref 3.8–10.5)
WBC # FLD AUTO: 9 K/UL — SIGNIFICANT CHANGE UP (ref 3.8–10.5)

## 2017-09-25 RX ORDER — POTASSIUM CHLORIDE 20 MEQ
40 PACKET (EA) ORAL DAILY
Qty: 0 | Refills: 0 | Status: COMPLETED | OUTPATIENT
Start: 2017-09-25 | End: 2017-09-27

## 2017-09-25 RX ORDER — FUROSEMIDE 40 MG
40 TABLET ORAL ONCE
Qty: 0 | Refills: 0 | Status: COMPLETED | OUTPATIENT
Start: 2017-09-25 | End: 2017-09-25

## 2017-09-25 RX ADMIN — PANTOPRAZOLE SODIUM 40 MILLIGRAM(S): 20 TABLET, DELAYED RELEASE ORAL at 11:59

## 2017-09-25 RX ADMIN — HEPARIN SODIUM 5000 UNIT(S): 5000 INJECTION INTRAVENOUS; SUBCUTANEOUS at 06:43

## 2017-09-25 RX ADMIN — Medication 40 MILLIGRAM(S): at 11:21

## 2017-09-25 RX ADMIN — Medication 40 MILLIEQUIVALENT(S): at 14:29

## 2017-09-25 RX ADMIN — PIPERACILLIN AND TAZOBACTAM 25 GRAM(S): 4; .5 INJECTION, POWDER, LYOPHILIZED, FOR SOLUTION INTRAVENOUS at 21:29

## 2017-09-25 RX ADMIN — SODIUM CHLORIDE 80 MILLILITER(S): 9 INJECTION INTRAMUSCULAR; INTRAVENOUS; SUBCUTANEOUS at 14:29

## 2017-09-25 RX ADMIN — PIPERACILLIN AND TAZOBACTAM 25 GRAM(S): 4; .5 INJECTION, POWDER, LYOPHILIZED, FOR SOLUTION INTRAVENOUS at 14:31

## 2017-09-25 RX ADMIN — HEPARIN SODIUM 5000 UNIT(S): 5000 INJECTION INTRAVENOUS; SUBCUTANEOUS at 18:14

## 2017-09-25 RX ADMIN — PIPERACILLIN AND TAZOBACTAM 25 GRAM(S): 4; .5 INJECTION, POWDER, LYOPHILIZED, FOR SOLUTION INTRAVENOUS at 05:43

## 2017-09-25 NOTE — PROGRESS NOTE ADULT - SUBJECTIVE AND OBJECTIVE BOX
Assessment:    H/O CAD  Last cath 2015 at that time medical therapy was recommended  Echo 2015 EF 25-30% is chronic and well managed with medical therapy  Troponin negative  Chronic systolic CHF is noted from chart with EF 25%, AICD already in place  Acute Ruthy, stable post cholecystectomy, surgical f/u noted  SBP now stable  NSVT acceptable, patient has AICD already, K given

## 2017-09-25 NOTE — PROGRESS NOTE ADULT - SUBJECTIVE AND OBJECTIVE BOX
Patient seen and examined in chair.  Patient requesting a diet.  Denies abdominal pain, nausea, vomiting.  Admits to flatus and normal BMx1 overnight.   Denies fever, chills, chest pain, sob, dizziness/lightheadedness.    Vital Signs Last 24 Hrs  T(F): 98.1 (09-25-17 @ 05:21), Max: 98.6 (09-24-17 @ 12:02)  HR: 69 (09-25-17 @ 05:21)  BP: 111/68 (09-25-17 @ 05:21)  RR: 18 (09-25-17 @ 05:21)  SpO2: 96% (09-25-17 @ 05:21)    GENERAL: Alert, oriented, NAD  CHEST/LUNG: Clear to auscultation bilaterally, respirations nonlabored  HEART: S1S2, Regular rate and rhythm  ABDOMEN: + Normoactive bowel sounds. RUQ incision with intermittent staples, no erythema/tenderness, umbilical incision with intermittent staples, no erythema/tenderness. RUQ TONY drain intact with serosanguinous output: 50cc/24hrs. Abdomen soft, nontender, nondistended.   EXTREMITIES: No calf tenderness b/l, no pedal edema b/l     LABS:                        10.1   9.0   )-----------( 178      ( 25 Sep 2017 06:26 )             29.8     09-25    141  |  108  |  52<H>  ----------------------------<  118<H>  3.5   |  19<L>  |  1.67<H>    Ca    8.1<L>      25 Sep 2017 06:26  Phos  2.6     09-25  Mg     2.6     09-25    TPro  6.1  /  Alb  2.1<L>  /  TBili  0.9  /  DBili  .48<H>  /  AST  32  /  ALT  61  /  AlkPhos  199<H>  09-25    Impression: 82 year old male PMH CAD, DM, HTN, HLD, MI POD #7 s/p laparoscopic converted to open cholecystectomy secondary to acute cholecystitis, started on PPN, with post-op klebsiella bacteremia, hypotension (resolved), MALATHI (improving), and elevated LFTs (improving)  Plan:  - Advanced diet to clear liquids this AM, and further if tolerated  - Decrease/discontinue PPN today per GI  - Continue TONY drain to self suction, monitor output  - Continue antibiotics, ID following  - Monitor LFTs, WBC  - DVT prophylaxis, incentive spirometer, pain management PRN  - OOB to ambulate as tolerated  - Continue medical/cardio f/u  - Discussed with Dr. Sheffield Patient seen and examined in chair.  Patient requesting a diet.  Denies abdominal pain, nausea, vomiting.  Admits to flatus and normal BMx1 overnight.   Denies fever, chills, chest pain, sob, dizziness/lightheadedness.    Vital Signs Last 24 Hrs  T(F): 98.1 (09-25-17 @ 05:21), Max: 98.6 (09-24-17 @ 12:02)  HR: 69 (09-25-17 @ 05:21)  BP: 111/68 (09-25-17 @ 05:21)  RR: 18 (09-25-17 @ 05:21)  SpO2: 96% (09-25-17 @ 05:21)    GENERAL: Alert, oriented, NAD  CHEST/LUNG: Clear to auscultation bilaterally, respirations nonlabored  HEART: S1S2, Regular rate and rhythm  ABDOMEN: + Normoactive bowel sounds. RUQ incision with intermittent staples, no erythema/tenderness, umbilical incision with intermittent staples, no erythema/tenderness. RUQ TONY drain intact with serous  output: 50cc/24hrs. Abdomen soft, nontender, nondistended.   EXTREMITIES: No calf tenderness b/l, no pedal edema b/l     LABS:                        10.1   9.0   )-----------( 178      ( 25 Sep 2017 06:26 )             29.8     09-25    141  |  108  |  52<H>  ----------------------------<  118<H>  3.5   |  19<L>  |  1.67<H>    Ca    8.1<L>      25 Sep 2017 06:26  Phos  2.6     09-25  Mg     2.6     09-25    TPro  6.1  /  Alb  2.1<L>  /  TBili  0.9  /  DBili  .48<H>  /  AST  32  /  ALT  61  /  AlkPhos  199<H>  09-25    Impression: 82 year old male PMH CAD, DM, HTN, HLD, MI POD #7 s/p laparoscopic converted to open cholecystectomy secondary to acute cholecystitis, started on PPN, with post-op klebsiella bacteremia, hypotension (resolved), MALATHI (improving), and elevated LFTs (improving)  Plan:  - Advanced diet to clear liquids this AM, and further if tolerated  - Decrease/discontinue PPN today per GI  - Continue TONY drain to self suction, monitor output  - Continue antibiotics, ID following  - Monitor LFTs, WBC  - DVT prophylaxis, incentive spirometer, pain management PRN  - OOB to ambulate as tolerated  - Continue medical/cardio f/u  - Discussed with Dr. Sheffield

## 2017-09-25 NOTE — PROGRESS NOTE ADULT - SUBJECTIVE AND OBJECTIVE BOX
Patient is a 82y old  Male who presents with a chief complaint of intermittent epigastric discomfort with food intake since Monday (14 Sep 2017 03:20)  feeling better. no fever. Labs show improvement. MALATHI resolving.     INTERVAL HPI/OVERNIGHT EVENTS: uneventful    MEDICATIONS  (STANDING):  heparin  Injectable 5000 Unit(s) SubCutaneous every 12 hours  insulin lispro (HumaLOG) corrective regimen sliding scale   SubCutaneous three times a day before meals  insulin lispro (HumaLOG) corrective regimen sliding scale   SubCutaneous at bedtime  dextrose 50% Injectable 12.5 Gram(s) IV Push once  dextrose 50% Injectable 25 Gram(s) IV Push once  dextrose 50% Injectable 25 Gram(s) IV Push once  pantoprazole  Injectable 40 milliGRAM(s) IV Push daily  piperacillin/tazobactam IVPB. 3.375 Gram(s) IV Intermittent every 8 hours  Parenteral Nutrition - Adult 1 Each (83 mL/Hr) TPN Continuous <Continuous>  sodium chloride 0.9%. 1000 milliLiter(s) (80 mL/Hr) IV Continuous <Continuous>    MEDICATIONS  (PRN):  ondansetron Injectable 4 milliGRAM(s) IV Push every 6 hours PRN Nausea  dextrose Gel 1 Dose(s) Oral once PRN Blood Glucose LESS THAN 70 milliGRAM(s)/deciliter  glucagon  Injectable 1 milliGRAM(s) IntraMuscular once PRN Glucose LESS THAN 70 milligrams/deciliter  acetaminophen   Tablet 650 milliGRAM(s) Oral every 6 hours PRN For Temp greater than 38 C (100.4 F)  hydrALAZINE Injectable 10 milliGRAM(s) IV Push every 6 hours PRN onl;y for SBP greater than 160      Allergies    No Known Allergies    Intolerances        REVIEW OF SYSTEMS:  CONSTITUTIONAL: No fever, weight loss, or fatigue  EYES: No eye pain, visual disturbances, or discharge  ENMT:  No difficulty hearing, tinnitus, vertigo; No sinus or throat pain  NECK: No pain or stiffness  BREASTS: No pain, masses, or nipple discharge  RESPIRATORY: No cough, wheezing, chills or hemoptysis; No shortness of breath  CARDIOVASCULAR: No chest pain, palpitations, dizziness, or leg swelling  GASTROINTESTINAL: No abdominal or epigastric pain. No nausea, vomiting, or hematemesis; No diarrhea or constipation. No melena or hematochezia.  GENITOURINARY: No dysuria, frequency, hematuria, or incontinence  NEUROLOGICAL: No headaches, memory loss, loss of strength, numbness, or tremors  SKIN: No itching, burning, rashes, or lesions   LYMPH NODES: No enlarged glands  ENDOCRINE: No heat or cold intolerance; No hair loss  MUSCULOSKELETAL: No joint pain or swelling; No muscle, back, or extremity pain  PSYCHIATRIC: No depression, anxiety, mood swings, or difficulty sleeping  HEME/LYMPH: No easy bruising, or bleeding gums  ALLERY AND IMMUNOLOGIC: No hives or eczema    Vital Signs Last 24 Hrs  T(C): 36.7 (25 Sep 2017 05:21), Max: 37 (24 Sep 2017 12:02)  T(F): 98.1 (25 Sep 2017 05:21), Max: 98.6 (24 Sep 2017 12:02)  HR: 69 (25 Sep 2017 05:21) (69 - 77)  BP: 111/68 (25 Sep 2017 05:21) (110/64 - 127/63)  BP(mean): --  RR: 18 (25 Sep 2017 05:21) (18 - 18)  SpO2: 96% (25 Sep 2017 05:21) (96% - 98%)    PHYSICAL EXAM:  GENERAL: NAD, well-groomed, well-developed  HEAD:  Atraumatic, Normocephalic  EYES: EOMI, PERRLA, conjunctiva and sclera clear  ENMT: No tonsillar erythema, exudates, or enlargement; Moist mucous membranes, Good dentition, No lesions  NECK: Supple, No JVD, Normal thyroid  NERVOUS SYSTEM:  Alert & Oriented X3, Good concentration; Motor Strength 5/5 B/L upper and lower extremities; DTRs 2+ intact and symmetric  CHEST/LUNG: Clear to percussion bilaterally; No rales, rhonchi, wheezing, or rubs  HEART: Regular rate and rhythm; No murmurs, rubs, or gallops  ABDOMEN: Soft, Nontender, Nondistended; Bowel sounds present  EXTREMITIES:  2+ Peripheral Pulses, No clubbing, cyanosis, or edema  LYMPH: No lymphadenopathy noted  SKIN: No rashes or lesions    LABS:                        10.1   9.0   )-----------( 178      ( 25 Sep 2017 06:26 )             29.8     09-    141  |  108  |  52<H>  ----------------------------<  118<H>  3.5   |  19<L>  |  1.67<H>    Ca    8.1<L>      25 Sep 2017 06:26  Phos  2.6       Mg     2.6         TPro  6.1  /  Alb  2.1<L>  /  TBili  0.9  /  DBili  .48<H>  /  AST  32  /  ALT  61  /  AlkPhos  199<H>          Urinalysis Basic - ( 23 Sep 2017 11:13 )    Color: Yellow / Appearance: Clear / S.015 / pH: x  Gluc: x / Ketone: Negative  / Bili: Small / Urobili: 4 mg/dL   Blood: x / Protein: 30 mg/dL / Nitrite: Negative   Leuk Esterase: Trace / RBC: 3-5 /HPF / WBC 3-5   Sq Epi: x / Non Sq Epi: x / Bacteria: Few      CAPILLARY BLOOD GLUCOSE  112 (25 Sep 2017 05:50)  111 (24 Sep 2017 23:13)  120 (24 Sep 2017 16:55)  133 (24 Sep 2017 10:55)                    RADIOLOGY & ADDITIONAL TESTS:    Imaging Personally Reviewed:  [ ] YES  [ ] NO    Consultant(s) Notes Reviewed:  [ ] YES  [ ] NO    Care Discussed with Consultants/Other Providers [ ] YES  [ ] NO    Care discussed with family,         [  ]   yes  [  ]  No    imp:    stable[ ]    unstable[  ]     improving [ x  ]       unchanged  [  ]                Plans:  Continue present plans  [x  ] continue Hydration. Will give lasix one dose  to prevent fluid overload.               New consult [  ]   specialty  .......               order test[  ]    test name.                  Discharge Planning  [  ]

## 2017-09-25 NOTE — PROGRESS NOTE ADULT - SUBJECTIVE AND OBJECTIVE BOX
Gastroenterology    Patient seen and examined bedside resting comfortably.  No complaints offered.   + incisional abdominal pain  Denies nausea and vomiting. NPO  + flatus/ no bm  T(F): 97.8 (09-25-17 @ 10:50), Max: 98.6 (09-24-17 @ 12:02)  HR: 72 (09-25-17 @ 10:50) (69 - 77)  BP: 142/92 (09-25-17 @ 10:50) (110/64 - 142/92)  RR: 18 (09-25-17 @ 10:50) (18 - 18)  SpO2: 97% (09-25-17 @ 10:50) (96% - 98%)  Wt(kg): --  CAPILLARY BLOOD GLUCOSE  112 (25 Sep 2017 05:50)  111 (24 Sep 2017 23:13)  120 (24 Sep 2017 16:55)          PHYSICAL EXAM:  General: NAD, WDWN.   Neuro:  Alert & oriented x 3  HEENT: NCAT, EOMI, conjunctiva clear  CV: +S1+S2 regular rate and rhythm  Lung: clear to ausculation bilaterally, respirations nonlabored, good inspiratory effort  Abdomen: soft, + TONY drain  incisional Tenderness, Normal active BS  Extremities: no cyanosis, clubbing or edema    LABS:                        10.1   9.0   )-----------( 178      ( 25 Sep 2017 06:26 )             29.8     09-25    141  |  108  |  52<H>  ----------------------------<  118<H>  3.5   |  19<L>  |  1.67<H>    Ca    8.1<L>      25 Sep 2017 06:26  Phos  2.6     09-25  Mg     2.6     09-25    TPro  6.1  /  Alb  2.1<L>  /  TBili  0.9  /  DBili  .48<H>  /  AST  32  /  ALT  61  /  AlkPhos  199<H>  09-25    LIVER FUNCTIONS - ( 25 Sep 2017 06:26 )  Alb: 2.1 g/dL / Pro: 6.1 gm/dL / ALK PHOS: 199 U/L / ALT: 61 U/L / AST: 32 U/L / GGT: x             I&O's Detail    24 Sep 2017 07:01  -  25 Sep 2017 07:00  --------------------------------------------------------  IN:    PPN (Peripheral Parenteral Nutrition): 1992 mL    sodium chloride 0.9%.: 1200 mL    Solution: 2000 mL    Solution: 400 mL  Total IN: 5592 mL    OUT:    Bulb: 50 mL    Voided: 2330 mL  Total OUT: 2380 mL    Total NET: 3212 mL        09-25 @ 06:26    141 | 108 | 52  /8.1 | 2.6 | 2.6  _______________________/  3.5 | 19 | 1.67                           \par   Lipase, Serum: 774 U/L (09-25 @ 06:26)

## 2017-09-25 NOTE — PROGRESS NOTE ADULT - ASSESSMENT
ac cholecystitis   sp sx   flatus and bm yesterday   post op fever bacteremia   still pending sensitivity of kleb   if tolerates diet and amenable to po antibiotics based on sensitivity data will use po antibiotics   will follow culture

## 2017-09-25 NOTE — PROGRESS NOTE ADULT - SUBJECTIVE AND OBJECTIVE BOX
82 year old male PMH CAD, DM, HTN, HLD, MI POD #7 s/p laparoscopic converted to open cholecystectomy secondary to acute cholecystitis, started on PPN, with post-op klebsiella bacteremia, hypotension (resolved), MALATHI (improving), and elevated LFTs  sp sx some peritoneal spillage per sx   post op fever   sepsis work up with kleb in blood sensitivity pending   clinically great   walking around with nurse  discussed with dr frederick again         Allergies    No Known Allergies    Intolerances        MEDICATIONS  (STANDING):  heparin  Injectable 5000 Unit(s) SubCutaneous every 12 hours  insulin lispro (HumaLOG) corrective regimen sliding scale   SubCutaneous three times a day before meals  insulin lispro (HumaLOG) corrective regimen sliding scale   SubCutaneous at bedtime  dextrose 50% Injectable 12.5 Gram(s) IV Push once  dextrose 50% Injectable 25 Gram(s) IV Push once  dextrose 50% Injectable 25 Gram(s) IV Push once  pantoprazole  Injectable 40 milliGRAM(s) IV Push daily  piperacillin/tazobactam IVPB. 3.375 Gram(s) IV Intermittent every 8 hours  Parenteral Nutrition - Adult 1 Each (83 mL/Hr) TPN Continuous <Continuous>  sodium chloride 0.9%. 1000 milliLiter(s) (80 mL/Hr) IV Continuous <Continuous>  potassium chloride    Tablet ER 40 milliEquivalent(s) Oral daily    MEDICATIONS  (PRN):  ondansetron Injectable 4 milliGRAM(s) IV Push every 6 hours PRN Nausea  dextrose Gel 1 Dose(s) Oral once PRN Blood Glucose LESS THAN 70 milliGRAM(s)/deciliter  glucagon  Injectable 1 milliGRAM(s) IntraMuscular once PRN Glucose LESS THAN 70 milligrams/deciliter  acetaminophen   Tablet 650 milliGRAM(s) Oral every 6 hours PRN For Temp greater than 38 C (100.4 F)  hydrALAZINE Injectable 10 milliGRAM(s) IV Push every 6 hours PRN onl;y for SBP greater than 160      REVIEW OF SYSTEMS:    CONSTITUTIONAL: No fever, chills, weight loss, or fatigue  HEENT: No sore throat, runny nose, ear ache  RESPIRATORY: No cough, wheezing, No shortness of breath  CARDIOVASCULAR: No chest pain, palpitations, dizziness  GASTROINTESTINAL: some residual abdominal pain  No nausea, vomiting, diarrhea  started liquids   GENITOURINARY: No dysuria, increase frequency, hematuria, or incontinence  NEUROLOGICAL: No headaches, memory loss, loss of strength, numbness, or tremors, no weakness  EXTREMITY: No pedal edema BLE  SKIN: No itching, burning, rashes, or lesions     VITAL SIGNS:  T(C): 36.6 (09-25-17 @ 10:50), Max: 36.7 (09-25-17 @ 05:21)  T(F): 97.8 (09-25-17 @ 10:50), Max: 98.1 (09-25-17 @ 05:21)  HR: 72 (09-25-17 @ 10:50) (69 - 77)  BP: 142/92 (09-25-17 @ 10:50) (111/68 - 142/92)  RR: 18 (09-25-17 @ 10:50) (18 - 18)  SpO2: 97% (09-25-17 @ 10:50) (96% - 98%)  Wt(kg): --    PHYSICAL EXAM:    GENERAL: not in any distress  HEENT: Neck is supple, normocephalic, atraumatic   CHEST/LUNG: Clear to percussion bilaterally; No rales, rhonchi, wheezing  HEART: Regular rate and rhythm; No murmurs, rubs, or gallops  ABDOMEN: Soft, tender, Nondistended; Bowel sounds present, no rebound   no pow infection  elmira plus   off PPN   EXTREMITIES:  2+ Peripheral Pulses, No clubbing, cyanosis, or edema  SKIN: No rashes or lesions  BACK: no pressor sore   NERVOUS SYSTEM:  Alert & Oriented X3, Good concentration  PSYCH: normal affect     LABS:                         10.1   9.0   )-----------( 178      ( 25 Sep 2017 06:26 )             29.8     09-25    141  |  108  |  52<H>  ----------------------------<  118<H>  3.5   |  19<L>  |  1.67<H>    Ca    8.1<L>      25 Sep 2017 06:26  Phos  2.6     09-25  Mg     2.6     09-25    TPro  6.1  /  Alb  2.1<L>  /  TBili  0.9  /  DBili  .48<H>  /  AST  32  /  ALT  61  /  AlkPhos  199<H>  09-25    LIVER FUNCTIONS - ( 25 Sep 2017 06:26 )  Alb: 2.1 g/dL / Pro: 6.1 gm/dL / ALK PHOS: 199 U/L / ALT: 61 U/L / AST: 32 U/L / GGT: x                                                 Radiology:

## 2017-09-26 ENCOUNTER — TRANSCRIPTION ENCOUNTER (OUTPATIENT)
Age: 82
End: 2017-09-26

## 2017-09-26 LAB
-  AMIKACIN: SIGNIFICANT CHANGE UP
-  AMPICILLIN/SULBACTAM: SIGNIFICANT CHANGE UP
-  AMPICILLIN: SIGNIFICANT CHANGE UP
-  AZTREONAM: SIGNIFICANT CHANGE UP
-  CEFAZOLIN: SIGNIFICANT CHANGE UP
-  CEFEPIME: SIGNIFICANT CHANGE UP
-  CEFOXITIN: SIGNIFICANT CHANGE UP
-  CEFTAZIDIME: SIGNIFICANT CHANGE UP
-  CEFTRIAXONE: SIGNIFICANT CHANGE UP
-  CIPROFLOXACIN: SIGNIFICANT CHANGE UP
-  ERTAPENEM: SIGNIFICANT CHANGE UP
-  GENTAMICIN: SIGNIFICANT CHANGE UP
-  IMIPENEM: SIGNIFICANT CHANGE UP
-  LEVOFLOXACIN: SIGNIFICANT CHANGE UP
-  MEROPENEM: SIGNIFICANT CHANGE UP
-  PIPERACILLIN/TAZOBACTAM: SIGNIFICANT CHANGE UP
-  TOBRAMYCIN: SIGNIFICANT CHANGE UP
-  TRIMETHOPRIM/SULFAMETHOXAZOLE: SIGNIFICANT CHANGE UP
ALBUMIN SERPL ELPH-MCNC: 2.3 G/DL — LOW (ref 3.3–5)
ALP SERPL-CCNC: 188 U/L — HIGH (ref 40–120)
ALT FLD-CCNC: 54 U/L — SIGNIFICANT CHANGE UP (ref 12–78)
ANION GAP SERPL CALC-SCNC: 11 MMOL/L — SIGNIFICANT CHANGE UP (ref 5–17)
AST SERPL-CCNC: 33 U/L — SIGNIFICANT CHANGE UP (ref 15–37)
BILIRUB DIRECT SERPL-MCNC: 0.31 MG/DL — HIGH (ref 0.05–0.2)
BILIRUB INDIRECT FLD-MCNC: 0.7 MG/DL — SIGNIFICANT CHANGE UP (ref 0.2–1)
BILIRUB SERPL-MCNC: 1 MG/DL — SIGNIFICANT CHANGE UP (ref 0.2–1.2)
BUN SERPL-MCNC: 36 MG/DL — HIGH (ref 7–23)
CALCIUM SERPL-MCNC: 7.9 MG/DL — LOW (ref 8.5–10.1)
CHLORIDE SERPL-SCNC: 111 MMOL/L — HIGH (ref 96–108)
CO2 SERPL-SCNC: 20 MMOL/L — LOW (ref 22–31)
CREAT SERPL-MCNC: 1.47 MG/DL — HIGH (ref 0.5–1.3)
CULTURE RESULTS: SIGNIFICANT CHANGE UP
CULTURE RESULTS: SIGNIFICANT CHANGE UP
GLUCOSE SERPL-MCNC: 100 MG/DL — HIGH (ref 70–99)
GRAM STN FLD: SIGNIFICANT CHANGE UP
GRAM STN FLD: SIGNIFICANT CHANGE UP
HCT VFR BLD CALC: 33.4 % — LOW (ref 39–50)
HGB BLD-MCNC: 11 G/DL — LOW (ref 13–17)
LIDOCAIN IGE QN: 1204 U/L — HIGH (ref 73–393)
MAGNESIUM SERPL-MCNC: 2.3 MG/DL — SIGNIFICANT CHANGE UP (ref 1.6–2.6)
MCHC RBC-ENTMCNC: 31.2 PG — SIGNIFICANT CHANGE UP (ref 27–34)
MCHC RBC-ENTMCNC: 32.7 GM/DL — SIGNIFICANT CHANGE UP (ref 32–36)
MCV RBC AUTO: 95.3 FL — SIGNIFICANT CHANGE UP (ref 80–100)
METHOD TYPE: SIGNIFICANT CHANGE UP
ORGANISM # SPEC MICROSCOPIC CNT: SIGNIFICANT CHANGE UP
PHOSPHATE SERPL-MCNC: 2.6 MG/DL — SIGNIFICANT CHANGE UP (ref 2.5–4.5)
PLATELET # BLD AUTO: 199 K/UL — SIGNIFICANT CHANGE UP (ref 150–400)
POTASSIUM SERPL-MCNC: 3.7 MMOL/L — SIGNIFICANT CHANGE UP (ref 3.5–5.3)
POTASSIUM SERPL-SCNC: 3.7 MMOL/L — SIGNIFICANT CHANGE UP (ref 3.5–5.3)
PROT SERPL-MCNC: 6.5 GM/DL — SIGNIFICANT CHANGE UP (ref 6–8.3)
RBC # BLD: 3.51 M/UL — LOW (ref 4.2–5.8)
RBC # FLD: 12.7 % — SIGNIFICANT CHANGE UP (ref 11–15)
SODIUM SERPL-SCNC: 142 MMOL/L — SIGNIFICANT CHANGE UP (ref 135–145)
SPECIMEN SOURCE: SIGNIFICANT CHANGE UP
SPECIMEN SOURCE: SIGNIFICANT CHANGE UP
WBC # BLD: 7.2 K/UL — SIGNIFICANT CHANGE UP (ref 3.8–10.5)
WBC # FLD AUTO: 7.2 K/UL — SIGNIFICANT CHANGE UP (ref 3.8–10.5)

## 2017-09-26 RX ADMIN — Medication 20 MILLIEQUIVALENT(S): at 12:50

## 2017-09-26 RX ADMIN — PIPERACILLIN AND TAZOBACTAM 25 GRAM(S): 4; .5 INJECTION, POWDER, LYOPHILIZED, FOR SOLUTION INTRAVENOUS at 13:11

## 2017-09-26 RX ADMIN — ONDANSETRON 4 MILLIGRAM(S): 8 TABLET, FILM COATED ORAL at 20:12

## 2017-09-26 RX ADMIN — PIPERACILLIN AND TAZOBACTAM 25 GRAM(S): 4; .5 INJECTION, POWDER, LYOPHILIZED, FOR SOLUTION INTRAVENOUS at 07:06

## 2017-09-26 RX ADMIN — PIPERACILLIN AND TAZOBACTAM 25 GRAM(S): 4; .5 INJECTION, POWDER, LYOPHILIZED, FOR SOLUTION INTRAVENOUS at 21:42

## 2017-09-26 RX ADMIN — PANTOPRAZOLE SODIUM 40 MILLIGRAM(S): 20 TABLET, DELAYED RELEASE ORAL at 11:05

## 2017-09-26 RX ADMIN — HEPARIN SODIUM 5000 UNIT(S): 5000 INJECTION INTRAVENOUS; SUBCUTANEOUS at 17:09

## 2017-09-26 RX ADMIN — HEPARIN SODIUM 5000 UNIT(S): 5000 INJECTION INTRAVENOUS; SUBCUTANEOUS at 05:33

## 2017-09-26 NOTE — PROGRESS NOTE ADULT - PROBLEM SELECTOR PROBLEM 5
Ileus following gastrointestinal surgery
Ileus following gastrointestinal surgery
Coronary artery disease involving native coronary artery of native heart without angina pectoris

## 2017-09-26 NOTE — DISCHARGE NOTE ADULT - INSTRUCTIONS
Fluid restrictions due to heart failure. Rest as needed. Do not lift anything heavier than 10 pounds. You may take motrin or advil for mild pain as needed, in addition to prescribed narcotic medication. Do not drive while taking narcotic pain medication. You may take over the counter stool softeners as needed. Call for any fever over 101, nausea, vomiting, severe pain, no passing of gas or bowel movement. You may shower and pat dry abdomen. Fluid restrictions due to heart failure. Rest as needed. Do not lift anything heavier than 10 pounds. You may take motrin or advil for mild pain as needed. You may take over the counter stool softeners as needed. Call for any fever over 101, nausea, vomiting, severe pain, no passing of gas or bowel movement. You may shower and pat dry abdomen.

## 2017-09-26 NOTE — PROGRESS NOTE ADULT - PROBLEM SELECTOR PROBLEM 3
Coronary artery disease involving native coronary artery of native heart without angina pectoris
Coronary artery disease involving native coronary artery of native heart without angina pectoris
Essential hypertension

## 2017-09-26 NOTE — DISCHARGE NOTE ADULT - CARE PROVIDERS DIRECT ADDRESSES
,DirectAddress_Unknown ,DirectAddress_Unknown,[email or web address]

## 2017-09-26 NOTE — PROGRESS NOTE ADULT - SUBJECTIVE AND OBJECTIVE BOX
82 year old male PMH CAD, DM, HTN, HLD, MI POD #7 s/p laparoscopic converted to open cholecystectomy secondary to acute cholecystitis, started on PPN, with post-op klebsiella bacteremia, hypotension (resolved), MALATHI (improving), and elevated LFTs  sp sx some peritoneal spillage per sx   post op fever   sepsis work up with kleb in blood sensitivity pending   clinically great   HPI:  81 y/o male PMHx AICD/pacemaker, HTN, HLD, CAD/MI, DM, EF of 25%, acid reflux, BPH with intermittent epigastric discomfort with food intake lasting for 1-2 hours per day over past three days. Slight nausea earlier yesterday. Denies abdominal pain or discomfort now. Last BM was yesterday and normal. Patient last ate at 4pm. No hx colonoscopy/endoscopy. Patient denies fever, chills, nausea, vomiting, constipation, diarrhea, hematuria, melena, hematochezia, chest pain, shortness of breath, dizziness, palpitations. Patient denies prior incident. (14 Sep 2017 03:20)      Allergies    No Known Allergies    Intolerances        MEDICATIONS  (STANDING):  heparin  Injectable 5000 Unit(s) SubCutaneous every 12 hours  insulin lispro (HumaLOG) corrective regimen sliding scale   SubCutaneous three times a day before meals  insulin lispro (HumaLOG) corrective regimen sliding scale   SubCutaneous at bedtime  dextrose 50% Injectable 12.5 Gram(s) IV Push once  dextrose 50% Injectable 25 Gram(s) IV Push once  dextrose 50% Injectable 25 Gram(s) IV Push once  pantoprazole  Injectable 40 milliGRAM(s) IV Push daily  piperacillin/tazobactam IVPB. 3.375 Gram(s) IV Intermittent every 8 hours  sodium chloride 0.9%. 1000 milliLiter(s) (75 mL/Hr) IV Continuous <Continuous>  potassium chloride    Tablet ER 40 milliEquivalent(s) Oral daily    MEDICATIONS  (PRN):  ondansetron Injectable 4 milliGRAM(s) IV Push every 6 hours PRN Nausea  dextrose Gel 1 Dose(s) Oral once PRN Blood Glucose LESS THAN 70 milliGRAM(s)/deciliter  glucagon  Injectable 1 milliGRAM(s) IntraMuscular once PRN Glucose LESS THAN 70 milligrams/deciliter  acetaminophen   Tablet 650 milliGRAM(s) Oral every 6 hours PRN For Temp greater than 38 C (100.4 F)  hydrALAZINE Injectable 10 milliGRAM(s) IV Push every 6 hours PRN onl;y for SBP greater than 160      REVIEW OF SYSTEMS:    CONSTITUTIONAL: No fever, chills, weight loss, or fatigue  HEENT: No sore throat, runny nose, ear ache  RESPIRATORY: No cough, wheezing, No shortness of breath  CARDIOVASCULAR: No chest pain, palpitations, dizziness  GASTROINTESTINAL: No abdominal pain. No nausea, vomiting, diarrhea  GENITOURINARY: No dysuria, increase frequency, hematuria, or incontinence  NEUROLOGICAL: No headaches, memory loss, loss of strength, numbness, or tremors, no weakness  EXTREMITY: No pedal edema BLE  SKIN: No itching, burning, rashes, or lesions     VITAL SIGNS:  T(C): 36.1 (09-26-17 @ 17:15), Max: 37.1 (09-25-17 @ 19:00)  T(F): 97 (09-26-17 @ 17:15), Max: 98.8 (09-25-17 @ 19:00)  HR: 68 (09-26-17 @ 17:15) (62 - 98)  BP: 130/65 (09-26-17 @ 17:15) (130/65 - 143/77)  RR: 17 (09-26-17 @ 17:15) (17 - 20)  SpO2: 97% (09-26-17 @ 17:15) (96% - 97%)  Wt(kg): --    PHYSICAL EXAM:    GENERAL: not in any distress  HEENT: Neck is supple, normocephalic, atraumatic   CHEST/LUNG: Clear to percussion bilaterally; No rales, rhonchi, wheezing  HEART: Regular rate and rhythm; No murmurs, rubs, or gallops  ABDOMEN: Soft, Nontender, Nondistended; Bowel sounds present, no rebound   EXTREMITIES:  2+ Peripheral Pulses, No clubbing, cyanosis, or edema  GENITOURINARY:   SKIN: No rashes or lesions  BACK: no pressor sore   NERVOUS SYSTEM:  Alert & Oriented X3, Good concentration  PSYCH: normal affect     LABS:                         11.0   7.2   )-----------( 199      ( 26 Sep 2017 07:26 )             33.4     09-26    142  |  111<H>  |  36<H>  ----------------------------<  100<H>  3.7   |  20<L>  |  1.47<H>    Ca    7.9<L>      26 Sep 2017 07:26  Phos  2.6     09-26  Mg     2.3     09-26    TPro  6.5  /  Alb  2.3<L>  /  TBili  1.0  /  DBili  .31<H>  /  AST  33  /  ALT  54  /  AlkPhos  188<H>  09-26    LIVER FUNCTIONS - ( 26 Sep 2017 07:26 )  Alb: 2.3 g/dL / Pro: 6.5 gm/dL / ALK PHOS: 188 U/L / ALT: 54 U/L / AST: 33 U/L / GGT: x                                                 Radiology: 82 year old male PMH CAD, DM, HTN, HLD, MI POD #7 s/p laparoscopic converted to open cholecystectomy secondary to acute cholecystitis, started on PPN, with post-op klebsiella bacteremia, hypotension (resolved), MALATHI (improving), and elevated LFTs  sp sx some peritoneal spillage per sx   post op fever   sepsis work up with kleb in blood sensitivity noted  clinically great   Allergies    No Known Allergies    Intolerances        MEDICATIONS  (STANDING):  heparin  Injectable 5000 Unit(s) SubCutaneous every 12 hours  insulin lispro (HumaLOG) corrective regimen sliding scale   SubCutaneous three times a day before meals  insulin lispro (HumaLOG) corrective regimen sliding scale   SubCutaneous at bedtime  dextrose 50% Injectable 12.5 Gram(s) IV Push once  dextrose 50% Injectable 25 Gram(s) IV Push once  dextrose 50% Injectable 25 Gram(s) IV Push once  pantoprazole  Injectable 40 milliGRAM(s) IV Push daily  piperacillin/tazobactam IVPB. 3.375 Gram(s) IV Intermittent every 8 hours  sodium chloride 0.9%. 1000 milliLiter(s) (75 mL/Hr) IV Continuous <Continuous>  potassium chloride    Tablet ER 40 milliEquivalent(s) Oral daily    MEDICATIONS  (PRN):  ondansetron Injectable 4 milliGRAM(s) IV Push every 6 hours PRN Nausea  dextrose Gel 1 Dose(s) Oral once PRN Blood Glucose LESS THAN 70 milliGRAM(s)/deciliter  glucagon  Injectable 1 milliGRAM(s) IntraMuscular once PRN Glucose LESS THAN 70 milligrams/deciliter  acetaminophen   Tablet 650 milliGRAM(s) Oral every 6 hours PRN For Temp greater than 38 C (100.4 F)  hydrALAZINE Injectable 10 milliGRAM(s) IV Push every 6 hours PRN onl;y for SBP greater than 160      REVIEW OF SYSTEMS:  he feels fine   CONSTITUTIONAL: No fever, chills, weight loss, or fatigue  HEENT: No sore throat, runny nose, ear ache  RESPIRATORY: No cough, wheezing, No shortness of breath  CARDIOVASCULAR: No chest pain, palpitations, dizziness  GASTROINTESTINAL: No abdominal pain. No nausea, vomiting, diarrhea  GENITOURINARY: No dysuria, increase frequency, hematuria, or incontinence  NEUROLOGICAL: No headaches, memory loss, loss of strength, numbness, or tremors, no weakness  EXTREMITY: No pedal edema BLE  SKIN: No itching, burning, rashes, or lesions     VITAL SIGNS:  T(C): 36.1 (09-26-17 @ 17:15), Max: 37.1 (09-25-17 @ 19:00)  T(F): 97 (09-26-17 @ 17:15), Max: 98.8 (09-25-17 @ 19:00)  HR: 68 (09-26-17 @ 17:15) (62 - 98)  BP: 130/65 (09-26-17 @ 17:15) (130/65 - 143/77)  RR: 17 (09-26-17 @ 17:15) (17 - 20)  SpO2: 97% (09-26-17 @ 17:15) (96% - 97%)  Wt(kg): --    PHYSICAL EXAM:    GENERAL: not in any distress  HEENT: Neck is supple, normocephalic, atraumatic   CHEST/LUNG: Clear  bilaterally; No rales, rhonchi, wheezing  HEART: Regular rate and rhythm; No murmurs, rubs, or gallops  ABDOMEN: Soft, Nontender, Nondistended; Bowel sounds present, no rebound   elmira plus   EXTREMITIES:  2+ Peripheral Pulses, No clubbing, cyanosis, or edema  SKIN: No rashes or lesions  BACK: no pressor sore   NERVOUS SYSTEM:  Alert & Oriented X3, Good concentration  PSYCH: normal affect     LABS:                         11.0   7.2   )-----------( 199      ( 26 Sep 2017 07:26 )             33.4     09-26    142  |  111<H>  |  36<H>  ----------------------------<  100<H>  3.7   |  20<L>  |  1.47<H>    Ca    7.9<L>      26 Sep 2017 07:26  Phos  2.6     09-26  Mg     2.3     09-26    TPro  6.5  /  Alb  2.3<L>  /  TBili  1.0  /  DBili  .31<H>  /  AST  33  /  ALT  54  /  AlkPhos  188<H>  09-26    LIVER FUNCTIONS - ( 26 Sep 2017 07:26 )  Alb: 2.3 g/dL / Pro: 6.5 gm/dL / ALK PHOS: 188 U/L / ALT: 54 U/L / AST: 33 U/L / GGT: x                                                 Radiology:

## 2017-09-26 NOTE — DISCHARGE NOTE ADULT - MEDICATION SUMMARY - MEDICATIONS TO TAKE
I will START or STAY ON the medications listed below when I get home from the hospital:    aspirin 81 mg oral tablet, chewable  -- 1 tab(s) by mouth once a day  -- Indication: For CAD (coronary artery disease)    tamsulosin 0.4 mg oral capsule  -- 1 cap(s) by mouth once a day  -- Indication: For BPH (Benign Prostatic Hyperplasia)    pravastatin 20 mg oral tablet  -- 1 tab(s) by mouth once a day (at bedtime)  -- Indication: For Hyperlipidemia    Coreg 3.125 mg oral tablet  -- 1 tab(s) by mouth 2 times a day  -- Indication: For Chronic systolic heart failure    furosemide 40 mg oral tablet  -- 1 tab(s) by mouth once a day MDD:1  -- Indication: For Chronic systolic heart failure    omeprazole  --  by mouth   -- Indication: For GERD    Levaquin 250 mg oral tablet  -- 1 tab(s) by mouth every 24 hours  -- Indication: For Postoperative bacteremia

## 2017-09-26 NOTE — DISCHARGE NOTE ADULT - PATIENT PORTAL LINK FT
“You can access the Follow[employer] Patient Portal, offered by [facility], by registering with the following website: [email or web address]”

## 2017-09-26 NOTE — DISCHARGE NOTE ADULT - MEDICATION SUMMARY - MEDICATIONS TO STOP TAKING
I will STOP taking the medications listed below when I get home from the hospital:    hydrochlorothiazide-valsartan 12.5 mg-80 mg oral tablet  -- 1 tab(s) by mouth once a day    carvedilol 12.5 mg oral tablet  -- 1 tab(s) by mouth 2 times a day

## 2017-09-26 NOTE — DISCHARGE NOTE ADULT - ADDITIONAL INSTRUCTIONS
Make an appointment to see Dr Sheffield for a follow up next week.  Make an appointment to see cardiologist next week.

## 2017-09-26 NOTE — PROGRESS NOTE ADULT - ASSESSMENT
ac cholecystitis   sp sx   flatus and bm yesterday   post op fever bacteremia   still pending sensitivity of kleb   if tolerates diet and amenable to po antibiotics based on sensitivity data will use po antibiotics   will follow culture ac cholecystitis   sp sx   flatus and bm yesterday   post op fever bacteremia   pan sensitive kleb   can discharge on po levaquin 250 daily ; end date is 10/6

## 2017-09-26 NOTE — PROGRESS NOTE ADULT - PROBLEM SELECTOR PROBLEM 2
Essential hypertension
Essential hypertension
Chronic systolic heart failure
Ileus following gastrointestinal surgery

## 2017-09-26 NOTE — DISCHARGE NOTE ADULT - CARE PROVIDER_API CALL
Jorge Sheffield (DO), Surgery  [address] [place] [address]  Phone: [phone]  Fax: [phone] Jorge Sheffield (), Surgery  [address] [place] [address]  Phone: [phone]  Fax: [phone]    Leo Ruiz), Cardiology  [address] [place] [address]  Phone: [phone]  Fax: [phone]

## 2017-09-26 NOTE — DISCHARGE NOTE ADULT - CARE PLAN
Principal Discharge DX:	Cholecystitis  Goal:	Post-op pain control  Instructions for follow-up, activity and diet:	Follow up in 7-10 days with Dr. Sheffield. Please call the office to make an appointment. Activity as tolerated. Rest as needed. You may eat a soft regular diet.  Secondary Diagnosis:	CAD (coronary artery disease)  Goal:	Follow up with Primary Care Physician and cardiology in 1 week. Continue current medications.  Secondary Diagnosis:	Benign prostatic hyperplasia, unspecified whether lower urinary tract symptoms present  Goal:	Follow up with PCP in 1 week.  Secondary Diagnosis:	Diabetes mellitus type 2, diet-controlled  Goal:	Follow up with PCP in 1 week. Principal Discharge DX:	Cholecystitis  Goal:	Post-op pain control  Instructions for follow-up, activity and diet:	Follow up in 7-10 days with Dr. Sheffield. Please call the office to make an appointment. Activity as tolerated. Rest as needed. You may eat a soft regular diet. Take antibiotics as directed.  Secondary Diagnosis:	CAD (coronary artery disease)  Goal:	Follow up with Primary Care Physician and cardiology in 1 week. Continue current medications.  Secondary Diagnosis:	Benign prostatic hyperplasia, unspecified whether lower urinary tract symptoms present  Goal:	Follow up with PCP in 1 week.  Secondary Diagnosis:	Diabetes mellitus type 2, diet-controlled  Goal:	Follow up with PCP in 1 week. Principal Discharge DX:	Cholecystitis  Goal:	Post-op pain control  Instructions for follow-up, activity and diet:	Follow up in 7-10 days with Dr. Sheffield. Please call the office to make an appointment. Activity as tolerated. Rest as needed. You may eat a soft regular diet. Take antibiotics as directed until 10/6/17.  Secondary Diagnosis:	CAD (coronary artery disease)  Goal:	Follow up with Primary Care Physician and cardiology in 1 week. Continue current medications.  Secondary Diagnosis:	Benign prostatic hyperplasia, unspecified whether lower urinary tract symptoms present  Goal:	Follow up with PCP in 1 week.  Secondary Diagnosis:	Diabetes mellitus type 2, diet-controlled  Goal:	Follow up with PCP in 1 week. Principal Discharge DX:	Cholecystitis  Goal:	Post-op pain control  Instructions for follow-up, activity and diet:	Follow up in 7-10 days with Dr. Sheffield. Please call the office to make an appointment. Activity as tolerated. Rest as needed. You may eat a soft regular diet. Take antibiotics as directed until 10/6/17.  Secondary Diagnosis:	CAD (coronary artery disease)  Goal:	Follow up with Primary Care Physician and cardiology in 1 week. Continue current medications.  Instructions for follow-up, activity and diet:	Please see your cardiologist or Dr Watson in 1 week for management of your cardiac medications, which have been modified.  Secondary Diagnosis:	Benign prostatic hyperplasia, unspecified whether lower urinary tract symptoms present  Goal:	Follow up with PCP in 1 week.  Secondary Diagnosis:	Diabetes mellitus type 2, diet-controlled  Goal:	Follow up with PCP in 1 week.

## 2017-09-26 NOTE — PROGRESS NOTE ADULT - SUBJECTIVE AND OBJECTIVE BOX
INTERVAL HPI/OVERNIGHT EVENTS:  Pt. seen and examined at bedside. Pt. offers no complaints at this time, no acute overnight event. Denies abdominal pain, has been tolerating clear liquids, denies N/V. States he had a BM yesterday. Ambulating often and voiding without difficulty.   Denies fever/chills, cp, sob, dizziness, dysuria.     Vital Signs Last 24 Hrs  T(C): 36.2 (26 Sep 2017 04:51), Max: 37.1 (25 Sep 2017 19:00)  T(F): 97.2 (26 Sep 2017 04:51), Max: 98.8 (25 Sep 2017 19:00)  HR: 62 (26 Sep 2017 04:51) (62 - 98)  BP: 134/62 (26 Sep 2017 04:51) (111/68 - 142/92)  BP(mean): --  RR: 20 (26 Sep 2017 04:51) (17 - 20)  SpO2: 97% (26 Sep 2017 04:51) (96% - 97%)    PHYSICAL EXAM:    GENERAL: Alert, oriented, NAD  CHEST/LUNG: Clear to auscultation bilaterally, respirations nonlabored  HEART: S1S2, RRR  ABDOMEN: + Normoactive bowel sounds. RUQ incision with intermittent staples, no erythema/tenderness, umbilical incision with intermittent staples, no erythema/tenderness. RUQ TONY drain intact with serous output: cc/24hrs. Abdomen soft, nontender, nondistended.   EXTREMITIES: No calf tenderness b/l, no pedal edema b/l     I&O's Detail    24 Sep 2017 07:01  -  25 Sep 2017 07:00  --------------------------------------------------------  IN:    PPN (Peripheral Parenteral Nutrition): 1992 mL    sodium chloride 0.9%.: 1200 mL    Solution: 2000 mL    Solution: 400 mL  Total IN: 5592 mL    OUT:    Bulb: 50 mL    Voided: 2330 mL  Total OUT: 2380 mL    Total NET: 3212 mL      25 Sep 2017 07:01  -  26 Sep 2017 05:01  --------------------------------------------------------  IN:    Oral Fluid: 340 mL  Total IN: 340 mL    OUT:    Voided: 2600 mL  Total OUT: 2600 mL    Total NET: -2260 mL      LABS:                        10.1   9.0   )-----------( 178      ( 25 Sep 2017 06:26 )             29.8     09-25    141  |  108  |  52<H>  ----------------------------<  118<H>  3.5   |  19<L>  |  1.67<H>    Ca    8.1<L>      25 Sep 2017 06:26  Phos  2.6     09-25  Mg     2.6     09-25    TPro  6.1  /  Alb  2.1<L>  /  TBili  0.9  /  DBili  .48<H>  /  AST  32  /  ALT  61  /  AlkPhos  199<H>  09-25    Impression: 83yo Male with PMH CAD, DM, HTN, HLD, MI POD #8 s/p laparoscopic converted to open cholecystectomy secondary to acute cholecystitis, started on PPN, with post-op klebsiella bacteremia, hypotension (resolved), MALATHI (improving), and elevated LFTs (improving). +BM/+flatus. Afebrile.     Plan:  - Advanced diet to full liquids this AM, and further today if tolerated  - PPN stopped per GI  - Continue TONY drain to self suction, monitor output  - Continue antibiotics, ID following  - Monitor LFTs, WBC  - DVT prophylaxis, incentive spirometer, pain management PRN  - OOB to ambulate as tolerated  - Continue medical/cardio f/u  - D/C planning--awaiting final blood culture sensitivity report for ID to decide on PO abx.  - Will discuss with Dr. Sheffield INTERVAL HPI/OVERNIGHT EVENTS:  Pt. seen and examined at bedside. Pt. offers no complaints at this time, no acute overnight event. States he is doing well. Denies abdominal pain, has been tolerating clear liquids, denies N/V. States he had a BM yesterday AM and last night. +flatus. Ambulating often and voiding without difficulty.   Denies fever/chills, cp, sob, dizziness, dysuria.     Vital Signs Last 24 Hrs  T(C): 36.2 (26 Sep 2017 04:51), Max: 37.1 (25 Sep 2017 19:00)  T(F): 97.2 (26 Sep 2017 04:51), Max: 98.8 (25 Sep 2017 19:00)  HR: 62 (26 Sep 2017 04:51) (62 - 98)  BP: 134/62 (26 Sep 2017 04:51) (111/68 - 142/92)  BP(mean): --  RR: 20 (26 Sep 2017 04:51) (17 - 20)  SpO2: 97% (26 Sep 2017 04:51) (96% - 97%)    PHYSICAL EXAM:    GENERAL: Alert, oriented, NAD  CHEST/LUNG: Clear to auscultation bilaterally, respirations nonlabored  HEART: S1S2, RRR  ABDOMEN: + Normoactive bowel sounds. RUQ incision with intermittent staples, no erythema/tenderness, umbilical incision with intermittent staples, no erythema/tenderness. RUQ TONY drain intact with serous output: cc/24hrs. Abdomen soft, nontender, nondistended.   EXTREMITIES: No calf tenderness b/l, no pedal edema b/l     I&O's Detail    24 Sep 2017 07:01  -  25 Sep 2017 07:00  --------------------------------------------------------  IN:    PPN (Peripheral Parenteral Nutrition): 1992 mL    sodium chloride 0.9%.: 1200 mL    Solution: 2000 mL    Solution: 400 mL  Total IN: 5592 mL    OUT:    Bulb: 50 mL    Voided: 2330 mL  Total OUT: 2380 mL    Total NET: 3212 mL      25 Sep 2017 07:01  -  26 Sep 2017 05:01  --------------------------------------------------------  IN:    Oral Fluid: 340 mL  Total IN: 340 mL    OUT:    Voided: 2600 mL  Total OUT: 2600 mL    Total NET: -2260 mL      LABS:                        10.1   9.0   )-----------( 178      ( 25 Sep 2017 06:26 )             29.8     09-25    141  |  108  |  52<H>  ----------------------------<  118<H>  3.5   |  19<L>  |  1.67<H>    Ca    8.1<L>      25 Sep 2017 06:26  Phos  2.6     09-25  Mg     2.6     09-25    TPro  6.1  /  Alb  2.1<L>  /  TBili  0.9  /  DBili  .48<H>  /  AST  32  /  ALT  61  /  AlkPhos  199<H>  09-25    Impression: 81yo Male with PMH CAD, DM, HTN, HLD, MI POD #8 s/p laparoscopic converted to open cholecystectomy secondary to acute cholecystitis, started on PPN, with post-op klebsiella bacteremia, hypotension (resolved), MALATHI (improving), and elevated LFTs (improving). +BM/+flatus. Afebrile.     Plan:  - Advanced diet to full liquids this AM, and further today if tolerated  - PPN stopped per GI  - Continue TONY drain to self suction, monitor output  - Continue antibiotics, ID following  - Monitor LFTs, WBC  - DVT prophylaxis, incentive spirometer, pain management PRN  - OOB to ambulate as tolerated  - Continue medical/cardio f/u  - D/C planning--awaiting final blood culture sensitivity report for ID to decide on PO abx.  - Will discuss with Dr. Sheffield INTERVAL HPI/OVERNIGHT EVENTS:  Pt. seen and examined at bedside. Pt. offers no complaints at this time, no acute overnight event. States he is doing well. Denies abdominal pain, has been tolerating clear liquids, denies N/V. States he had a BM yesterday AM and last night. +flatus. Ambulating often and voiding without difficulty.   Denies fever/chills, cp, sob, dizziness, dysuria.     Vital Signs Last 24 Hrs  T(C): 36.2 (26 Sep 2017 04:51), Max: 37.1 (25 Sep 2017 19:00)  T(F): 97.2 (26 Sep 2017 04:51), Max: 98.8 (25 Sep 2017 19:00)  HR: 62 (26 Sep 2017 04:51) (62 - 98)  BP: 134/62 (26 Sep 2017 04:51) (111/68 - 142/92)  BP(mean): --  RR: 20 (26 Sep 2017 04:51) (17 - 20)  SpO2: 97% (26 Sep 2017 04:51) (96% - 97%)    PHYSICAL EXAM:    GENERAL: Alert, oriented, NAD  CHEST/LUNG: Clear to auscultation bilaterally, respirations nonlabored  HEART: S1S2, RRR  ABDOMEN: + Normoactive bowel sounds. RUQ incision with intermittent staples, no erythema/tenderness, umbilical incision with intermittent staples, no erythema/tenderness. RUQ TONY drain intact with serous output: 15cc seen in bulb. Abdomen soft, nontender, nondistended.   EXTREMITIES: No calf tenderness b/l, no pedal edema b/l     I&O's Detail    24 Sep 2017 07:01  -  25 Sep 2017 07:00  --------------------------------------------------------  IN:    PPN (Peripheral Parenteral Nutrition): 1992 mL    sodium chloride 0.9%.: 1200 mL    Solution: 2000 mL    Solution: 400 mL  Total IN: 5592 mL    OUT:    Bulb: 50 mL    Voided: 2330 mL  Total OUT: 2380 mL    Total NET: 3212 mL      25 Sep 2017 07:01  -  26 Sep 2017 05:01  --------------------------------------------------------  IN:    Oral Fluid: 340 mL  Total IN: 340 mL    OUT:    Voided: 2600 mL  Total OUT: 2600 mL    Total NET: -2260 mL      LABS:                        10.1   9.0   )-----------( 178      ( 25 Sep 2017 06:26 )             29.8     09-25    141  |  108  |  52<H>  ----------------------------<  118<H>  3.5   |  19<L>  |  1.67<H>    Ca    8.1<L>      25 Sep 2017 06:26  Phos  2.6     09-25  Mg     2.6     09-25    TPro  6.1  /  Alb  2.1<L>  /  TBili  0.9  /  DBili  .48<H>  /  AST  32  /  ALT  61  /  AlkPhos  199<H>  09-25    Impression: 83yo Male with PMH CAD, DM, HTN, HLD, MI POD #8 s/p laparoscopic converted to open cholecystectomy secondary to acute cholecystitis, started on PPN, with post-op klebsiella bacteremia, hypotension (resolved), MALATHI (improving), and elevated LFTs (improving). +BM/+flatus. Afebrile.     Plan:  - Advanced diet to full liquids this AM, and further today if tolerated  - PPN stopped per GI  - Continue TONY drain to self suction, monitor output  - Continue antibiotics, ID following  - Monitor LFTs, WBC  - DVT prophylaxis, incentive spirometer, pain management PRN  - OOB to ambulate as tolerated  - Continue medical/cardio f/u  - D/C planning--awaiting final blood culture sensitivity report for ID to decide on PO abx.  - Will discuss with Dr. Sheffield

## 2017-09-26 NOTE — DISCHARGE NOTE ADULT - HOSPITAL COURSE
83 y/o male PMHx AICD/pacemaker, HTN, HLD, CAD/MI, acid reflux, BPH with intermittent epigastric discomfort with food intake lasting for 1-2 hours per day over past three days. CT showed Cholelithiasis with marked irregular noncircumferential thickening of the gallbladder walls. Patient went for HIDA. MRI was contraindicated due to pacemaker. IV antibiotics given. Cardiology, Medicine, GI, and ID consulted.     Patient went to the OR for a lap converted to open cholecystectomy. Operation went well. Patient tolerated operation well. Patient was hemodynamically stable post-operatively. Patient continued to do well from the PACU to the floors. Patient tolerated NGT and PPN post-operatively. TONY drain was placed intra-op. After the NGT came out, diet was advanced to soft without any nausea/vomiting. Patient tolerated well. PPN tapered off by GI. Pathology from OR came back benign.    At the time of discharge, the patient was hemodynamically stable, was tolerating PO diet, was voiding urine and passing stool, was ambulating, and was comfortable with adequate pain control. No nausea, vomiting, fever, chills. Patient instructed to follow up in 7-10 days and to call the office to make an appointment. Patient instructed to call for any fever over 101, nausea, vomiting, severe pain, no passing of gas or bowel movement. Patient understood. 81 y/o male PMHx AICD/pacemaker, HTN, HLD, CAD/MI, acid reflux, BPH with intermittent epigastric discomfort with food intake lasting for 1-2 hours per day over past three days. CT showed Cholelithiasis with marked irregular noncircumferential thickening of the gallbladder walls. Patient went for HIDA. MRI was contraindicated due to pacemaker. IV antibiotics given. Cardiology, Medicine, GI, and ID consulted.     Patient went to the OR for a lap converted to open cholecystectomy. Operation went well. Patient tolerated operation well. Patient was hemodynamically stable post-operatively. Patient continued to do well from the PACU to the floors. Patient tolerated NGT and PPN post-operatively. TONY drain was placed intra-op. After the NGT came out, diet was advanced to soft without any nausea/vomiting. Patient tolerated well. PPN tapered off by GI. Pathology from OR came back benign.    Postoperative course complicated by klebsiella bacteremia and NSVT. Pt was cleared from ID, cardio, GI, and surgical standpoints for discharge on 9/28.    At the time of discharge, the patient was hemodynamically stable, was tolerating PO diet, was voiding urine and passing stool, was ambulating, and was comfortable with adequate pain control. No nausea, vomiting, fever, chills. Patient instructed to follow up in 7-10 days and to call the office to make an appointment. Patient instructed to call for any fever over 101, nausea, vomiting, severe pain, no passing of gas or bowel movement. Patient understood. 81 y/o male PMHx AICD/pacemaker, HTN, HLD, CAD/MI, acid reflux, BPH with intermittent epigastric discomfort with food intake lasting for 1-2 hours per day over past three days. CT showed Cholelithiasis with marked irregular noncircumferential thickening of the gallbladder walls. Patient went for HIDA. MRI was contraindicated due to pacemaker. IV antibiotics given. Cardiology, Medicine, GI, and ID consulted.     Patient went to the OR for a lap converted to open cholecystectomy. Operation went well. Patient tolerated operation well. Patient was hemodynamically stable post-operatively. Patient continued to do well from the PACU to the floors. Patient tolerated NGT and PPN post-operatively. TONY drain was placed intra-op. After the NGT came out, diet was advanced to soft without any nausea/vomiting. Patient tolerated well. PPN tapered off by GI. Pathology from OR came back no Malignancy. + Cholecysytitis.    Postoperative course complicated by klebsiella bacteremia, Fever, Hypotension, and NSVT. LFT's elevation improved, Lipase continued elevated, but patient was asymptomatic, no abdominal pain, no N/V, tolerating diet. Pt was cleared from ID, cardio, GI, and surgical standpoints for discharge on 9/28.    At the time of discharge, the patient was hemodynamically stable, was tolerating PO diet, was voiding urine and passing stool, was ambulating, and was comfortable with adequate pain control. No nausea, vomiting, fever, chills. Patient instructed to follow up in 7-10 days and to call the office to make an appointment. Patient instructed to call for any fever over 101, nausea, vomiting, severe pain, no passing of gas or bowel movement. Patient understood.

## 2017-09-26 NOTE — DISCHARGE NOTE ADULT - SECONDARY DIAGNOSIS.
CAD (coronary artery disease) Benign prostatic hyperplasia, unspecified whether lower urinary tract symptoms present Diabetes mellitus type 2, diet-controlled

## 2017-09-26 NOTE — PROGRESS NOTE ADULT - PROBLEM SELECTOR PROBLEM 4
Calculus of gallbladder with acute cholecystitis and obstruction
Calculus of gallbladder with acute cholecystitis and obstruction
Diabetes mellitus type 2, diet-controlled

## 2017-09-26 NOTE — PROGRESS NOTE ADULT - SUBJECTIVE AND OBJECTIVE BOX
Patient is a 82y old  Male who presents with a chief complaint of intermittent epigastric discomfort with food intake since Monday (14 Sep 2017 03:20)  much improved.    INTERVAL HPI/OVERNIGHT EVENTS: uneventful. Patient has no complaints    MEDICATIONS  (STANDING):  heparin  Injectable 5000 Unit(s) SubCutaneous every 12 hours  insulin lispro (HumaLOG) corrective regimen sliding scale   SubCutaneous three times a day before meals  insulin lispro (HumaLOG) corrective regimen sliding scale   SubCutaneous at bedtime  dextrose 50% Injectable 12.5 Gram(s) IV Push once  dextrose 50% Injectable 25 Gram(s) IV Push once  dextrose 50% Injectable 25 Gram(s) IV Push once  pantoprazole  Injectable 40 milliGRAM(s) IV Push daily  piperacillin/tazobactam IVPB. 3.375 Gram(s) IV Intermittent every 8 hours  sodium chloride 0.9%. 1000 milliLiter(s) (80 mL/Hr) IV Continuous <Continuous>  potassium chloride    Tablet ER 40 milliEquivalent(s) Oral daily    MEDICATIONS  (PRN):  ondansetron Injectable 4 milliGRAM(s) IV Push every 6 hours PRN Nausea  dextrose Gel 1 Dose(s) Oral once PRN Blood Glucose LESS THAN 70 milliGRAM(s)/deciliter  glucagon  Injectable 1 milliGRAM(s) IntraMuscular once PRN Glucose LESS THAN 70 milligrams/deciliter  acetaminophen   Tablet 650 milliGRAM(s) Oral every 6 hours PRN For Temp greater than 38 C (100.4 F)  hydrALAZINE Injectable 10 milliGRAM(s) IV Push every 6 hours PRN onl;y for SBP greater than 160      Allergies    No Known Allergies    Intolerances        REVIEW OF SYSTEMS:  CONSTITUTIONAL: No fever, weight loss, or fatigue  EYES: No eye pain, visual disturbances, or discharge  ENMT:  No difficulty hearing, tinnitus, vertigo; No sinus or throat pain  NECK: No pain or stiffness  BREASTS: No pain, masses, or nipple discharge  RESPIRATORY: No cough, wheezing, chills or hemoptysis; No shortness of breath  CARDIOVASCULAR: No chest pain, palpitations, dizziness, or leg swelling  GASTROINTESTINAL: No abdominal or epigastric pain. No nausea, vomiting, or hematemesis; No diarrhea or constipation. No melena or hematochezia.  GENITOURINARY: No dysuria, frequency, hematuria, or incontinence  NEUROLOGICAL: No headaches, memory loss, loss of strength, numbness, or tremors  SKIN: No itching, burning, rashes, or lesions   LYMPH NODES: No enlarged glands  ENDOCRINE: No heat or cold intolerance; No hair loss  MUSCULOSKELETAL: No joint pain or swelling; No muscle, back, or extremity pain  PSYCHIATRIC: No depression, anxiety, mood swings, or difficulty sleeping  HEME/LYMPH: No easy bruising, or bleeding gums  ALLERY AND IMMUNOLOGIC: No hives or eczema    Vital Signs Last 24 Hrs  T(C): 36.2 (26 Sep 2017 04:51), Max: 37.1 (25 Sep 2017 19:00)  T(F): 97.2 (26 Sep 2017 04:51), Max: 98.8 (25 Sep 2017 19:00)  HR: 62 (26 Sep 2017 04:51) (62 - 98)  BP: 134/62 (26 Sep 2017 04:51) (134/62 - 142/92)  BP(mean): --  RR: 20 (26 Sep 2017 04:51) (17 - 20)  SpO2: 97% (26 Sep 2017 04:51) (96% - 97%)    PHYSICAL EXAM:  GENERAL: NAD, well-groomed, well-developed  HEAD:  Atraumatic, Normocephalic  EYES: EOMI, PERRLA, conjunctiva and sclera clear  ENMT: No tonsillar erythema, exudates, or enlargement; Moist mucous membranes, Good dentition, No lesions  NECK: Supple, No JVD, Normal thyroid  NERVOUS SYSTEM:  Alert & Oriented X3, Good concentration; Motor Strength 5/5 B/L upper and lower extremities; DTRs 2+ intact and symmetric  CHEST/LUNG: Clear to percussion bilaterally; No rales, rhonchi, wheezing, or rubs  HEART: Regular rate and rhythm; No murmurs, rubs, or gallops  ABDOMEN: Soft, Nontender, Nondistended; Bowel sounds present  EXTREMITIES:  2+ Peripheral Pulses, No clubbing, cyanosis, or edema  LYMPH: No lymphadenopathy noted  SKIN: No rashes or lesions    LABS:                        11.0   7.2   )-----------( 199      ( 26 Sep 2017 07:26 )             33.4     09-26    142  |  111<H>  |  36<H>  ----------------------------<  100<H>  3.7   |  20<L>  |  1.47<H>    Ca    7.9<L>      26 Sep 2017 07:26  Phos  2.6     09-26  Mg     2.3     09-26    TPro  6.5  /  Alb  2.3<L>  /  TBili  1.0  /  DBili  .31<H>  /  AST  33  /  ALT  54  /  AlkPhos  188<H>  09-26          CAPILLARY BLOOD GLUCOSE  104 (26 Sep 2017 08:08)  121 (25 Sep 2017 22:55)  114 (25 Sep 2017 17:21)  125 (25 Sep 2017 11:52)                    RADIOLOGY & ADDITIONAL TESTS:    Imaging Personally Reviewed:  [ ] YES  [ ] NO    Consultant(s) Notes Reviewed:  [ ] YES  [ ] NO    Care Discussed with Consultants/Other Providers [ ] YES  [ ] NO    Care discussed with family,         [  ]   yes  [  ]  No    imp:    stable[ ]    unstable[  ]     improving [  x ]       unchanged  [  ]                Plans:  Continue present plans  [x  ] continue Zosyn, ID follow up, surgical follow up.                New consult [  ]   specialty  .......               order test[  ]    test name.                  Discharge Planning  [  ]

## 2017-09-26 NOTE — DISCHARGE NOTE ADULT - PLAN OF CARE
Post-op pain control Follow up in 7-10 days with Dr. Sheffield. Please call the office to make an appointment. Activity as tolerated. Rest as needed. You may eat a soft regular diet. Follow up with Primary Care Physician and cardiology in 1 week. Continue current medications. Follow up with PCP in 1 week. Follow up in 7-10 days with Dr. Sheffield. Please call the office to make an appointment. Activity as tolerated. Rest as needed. You may eat a soft regular diet. Take antibiotics as directed. Follow up in 7-10 days with Dr. Sheffield. Please call the office to make an appointment. Activity as tolerated. Rest as needed. You may eat a soft regular diet. Take antibiotics as directed until 10/6/17. Please see your cardiologist or Dr Watson in 1 week for management of your cardiac medications, which have been modified.

## 2017-09-26 NOTE — CHART NOTE - NSCHARTNOTEFT_GEN_A_CORE
AM lab review:                          11.0   7.2   )-----------( 199      ( 26 Sep 2017 07:26 )             33.4     09-26    142  |  111<H>  |  36<H>  ----------------------------<  100<H>  3.7   |  20<L>  |  1.47<H>    Ca    7.9<L>      26 Sep 2017 07:26  Phos  2.6     09-26  Mg     2.3     09-26    TPro  6.5  /  Alb  2.3<L>  /  TBili  1.0  /  DBili  .31<H>  /  AST  33  /  ALT  54  /  AlkPhos  188<H>  09-26      Impression:  BUN/CR , alk phos trending down  lipase elevated   BR now 1.0    Plan:  PPN stopped last PM.  Advance diet as tolerated.

## 2017-09-26 NOTE — PROGRESS NOTE ADULT - SUBJECTIVE AND OBJECTIVE BOX
Gastroenterolgy  Patient seen and examined bedside resting comfortably.  No complaints offered. Abdominal pain is well controlled.  Denies nausea and vomiting. Tolerating full liquid diet.  + flatus/BM.     T(F): 97.5 (09-26-17 @ 10:20), Max: 98.8 (09-25-17 @ 19:00)  HR: 66 (09-26-17 @ 10:20) (62 - 98)  BP: 143/77 (09-26-17 @ 10:20) (134/62 - 143/77)  RR: 18 (09-26-17 @ 10:20) (17 - 20)  SpO2: 97% (09-26-17 @ 10:20) (96% - 97%)  Wt(kg): --  CAPILLARY BLOOD GLUCOSE  126 (26 Sep 2017 11:01)  104 (26 Sep 2017 08:08)  121 (25 Sep 2017 22:55)  114 (25 Sep 2017 17:21)          PHYSICAL EXAM:  General: NAD, WDWN.   Neuro:  Alert & oriented x 3  HEENT: NCAT, EOMI, conjunctiva clear  CV: +S1+S2 regular rate and rhythm  Lung: clear to ausculation bilaterally, respirations nonlabored, good inspiratory effort  Abdomen: soft,+ TONY drain  incisional Tenderness No Distension. Normal active BS  Extremities: no pedal edema or calf tenderness noted     LABS:                        11.0   7.2   )-----------( 199      ( 26 Sep 2017 07:26 )             33.4     09-26    142  |  111<H>  |  36<H>  ----------------------------<  100<H>  3.7   |  20<L>  |  1.47<H>    Ca    7.9<L>      26 Sep 2017 07:26  Phos  2.6     09-26  Mg     2.3     09-26    TPro  6.5  /  Alb  2.3<L>  /  TBili  1.0  /  DBili  .31<H>  /  AST  33  /  ALT  54  /  AlkPhos  188<H>  09-26    LIVER FUNCTIONS - ( 26 Sep 2017 07:26 )  Alb: 2.3 g/dL / Pro: 6.5 gm/dL / ALK PHOS: 188 U/L / ALT: 54 U/L / AST: 33 U/L / GGT: x             I&O's Detail    25 Sep 2017 07:01  -  26 Sep 2017 07:00  --------------------------------------------------------  IN:    Oral Fluid: 340 mL  Total IN: 340 mL    OUT:    Bulb: 15 mL    Voided: 2600 mL  Total OUT: 2615 mL    Total NET: -2275 mL        09-26 @ 07:26    142 | 111 | 36  /7.9 | 2.3 | 2.6  _______________________/  3.7 | 20 | 1.47                           \par

## 2017-09-27 LAB
ALBUMIN SERPL ELPH-MCNC: 2.3 G/DL — LOW (ref 3.3–5)
ALBUMIN SERPL ELPH-MCNC: 2.3 G/DL — LOW (ref 3.3–5)
ALP SERPL-CCNC: 190 U/L — HIGH (ref 40–120)
ALP SERPL-CCNC: 196 U/L — HIGH (ref 40–120)
ALT FLD-CCNC: 55 U/L — SIGNIFICANT CHANGE UP (ref 12–78)
ALT FLD-CCNC: 60 U/L — SIGNIFICANT CHANGE UP (ref 12–78)
ANION GAP SERPL CALC-SCNC: 9 MMOL/L — SIGNIFICANT CHANGE UP (ref 5–17)
AST SERPL-CCNC: 32 U/L — SIGNIFICANT CHANGE UP (ref 15–37)
AST SERPL-CCNC: 33 U/L — SIGNIFICANT CHANGE UP (ref 15–37)
BILIRUB DIRECT SERPL-MCNC: 0.23 MG/DL — HIGH (ref 0.05–0.2)
BILIRUB INDIRECT FLD-MCNC: 0.5 MG/DL — SIGNIFICANT CHANGE UP (ref 0.2–1)
BILIRUB SERPL-MCNC: 0.7 MG/DL — SIGNIFICANT CHANGE UP (ref 0.2–1.2)
BILIRUB SERPL-MCNC: 0.7 MG/DL — SIGNIFICANT CHANGE UP (ref 0.2–1.2)
BUN SERPL-MCNC: 27 MG/DL — HIGH (ref 7–23)
CALCIUM SERPL-MCNC: 8.2 MG/DL — LOW (ref 8.5–10.1)
CHLORIDE SERPL-SCNC: 113 MMOL/L — HIGH (ref 96–108)
CO2 SERPL-SCNC: 22 MMOL/L — SIGNIFICANT CHANGE UP (ref 22–31)
CREAT SERPL-MCNC: 1.37 MG/DL — HIGH (ref 0.5–1.3)
GLUCOSE SERPL-MCNC: 110 MG/DL — HIGH (ref 70–99)
HCT VFR BLD CALC: 30.8 % — LOW (ref 39–50)
HGB BLD-MCNC: 10.2 G/DL — LOW (ref 13–17)
LIDOCAIN IGE QN: 1166 U/L — HIGH (ref 73–393)
MAGNESIUM SERPL-MCNC: 2.6 MG/DL — SIGNIFICANT CHANGE UP (ref 1.6–2.6)
MCHC RBC-ENTMCNC: 30.8 PG — SIGNIFICANT CHANGE UP (ref 27–34)
MCHC RBC-ENTMCNC: 33 GM/DL — SIGNIFICANT CHANGE UP (ref 32–36)
MCV RBC AUTO: 93.4 FL — SIGNIFICANT CHANGE UP (ref 80–100)
PHOSPHATE SERPL-MCNC: 2.9 MG/DL — SIGNIFICANT CHANGE UP (ref 2.5–4.5)
PLATELET # BLD AUTO: 242 K/UL — SIGNIFICANT CHANGE UP (ref 150–400)
POTASSIUM SERPL-MCNC: 4 MMOL/L — SIGNIFICANT CHANGE UP (ref 3.5–5.3)
POTASSIUM SERPL-SCNC: 4 MMOL/L — SIGNIFICANT CHANGE UP (ref 3.5–5.3)
PROT SERPL-MCNC: 6.2 GM/DL — SIGNIFICANT CHANGE UP (ref 6–8.3)
PROT SERPL-MCNC: 6.3 GM/DL — SIGNIFICANT CHANGE UP (ref 6–8.3)
RBC # BLD: 3.3 M/UL — LOW (ref 4.2–5.8)
RBC # FLD: 13 % — SIGNIFICANT CHANGE UP (ref 11–15)
SODIUM SERPL-SCNC: 144 MMOL/L — SIGNIFICANT CHANGE UP (ref 135–145)
WBC # BLD: 8 K/UL — SIGNIFICANT CHANGE UP (ref 3.8–10.5)
WBC # FLD AUTO: 8 K/UL — SIGNIFICANT CHANGE UP (ref 3.8–10.5)

## 2017-09-27 RX ADMIN — PANTOPRAZOLE SODIUM 40 MILLIGRAM(S): 20 TABLET, DELAYED RELEASE ORAL at 11:59

## 2017-09-27 RX ADMIN — PIPERACILLIN AND TAZOBACTAM 25 GRAM(S): 4; .5 INJECTION, POWDER, LYOPHILIZED, FOR SOLUTION INTRAVENOUS at 05:23

## 2017-09-27 RX ADMIN — PIPERACILLIN AND TAZOBACTAM 25 GRAM(S): 4; .5 INJECTION, POWDER, LYOPHILIZED, FOR SOLUTION INTRAVENOUS at 13:25

## 2017-09-27 RX ADMIN — HEPARIN SODIUM 5000 UNIT(S): 5000 INJECTION INTRAVENOUS; SUBCUTANEOUS at 05:23

## 2017-09-27 RX ADMIN — Medication 20 MILLIEQUIVALENT(S): at 13:22

## 2017-09-27 RX ADMIN — HEPARIN SODIUM 5000 UNIT(S): 5000 INJECTION INTRAVENOUS; SUBCUTANEOUS at 17:08

## 2017-09-27 RX ADMIN — PIPERACILLIN AND TAZOBACTAM 25 GRAM(S): 4; .5 INJECTION, POWDER, LYOPHILIZED, FOR SOLUTION INTRAVENOUS at 21:04

## 2017-09-27 NOTE — PROGRESS NOTE ADULT - ASSESSMENT
mediaclly stable for discharge. CKD: diet controlled diabetes, HTN , AICD  controlled with present meds. . , s/p open joellen  with chiolangitis , now rerolved.  patient can be dischrged on Oral abx as advised by ID. for follow up as OP.

## 2017-09-27 NOTE — PROGRESS NOTE ADULT - SUBJECTIVE AND OBJECTIVE BOX
Patient is a 82y old  Male who presents with a chief complaint of RUQ pain (26 Sep 2017 23:27)    much improved. No fever. Hasd leg edema which has subsided after discontinuing  Iv fluids. MALATHI improved.   INTERVAL HPI/OVERNIGHT EVENTS: as above.    MEDICATIONS  (STANDING):  heparin  Injectable 5000 Unit(s) SubCutaneous every 12 hours  insulin lispro (HumaLOG) corrective regimen sliding scale   SubCutaneous three times a day before meals  insulin lispro (HumaLOG) corrective regimen sliding scale   SubCutaneous at bedtime  dextrose 50% Injectable 12.5 Gram(s) IV Push once  dextrose 50% Injectable 25 Gram(s) IV Push once  dextrose 50% Injectable 25 Gram(s) IV Push once  pantoprazole  Injectable 40 milliGRAM(s) IV Push daily  piperacillin/tazobactam IVPB. 3.375 Gram(s) IV Intermittent every 8 hours  potassium chloride    Tablet ER 40 milliEquivalent(s) Oral daily    MEDICATIONS  (PRN):  ondansetron Injectable 4 milliGRAM(s) IV Push every 6 hours PRN Nausea  dextrose Gel 1 Dose(s) Oral once PRN Blood Glucose LESS THAN 70 milliGRAM(s)/deciliter  glucagon  Injectable 1 milliGRAM(s) IntraMuscular once PRN Glucose LESS THAN 70 milligrams/deciliter  acetaminophen   Tablet 650 milliGRAM(s) Oral every 6 hours PRN For Temp greater than 38 C (100.4 F)  hydrALAZINE Injectable 10 milliGRAM(s) IV Push every 6 hours PRN onl;y for SBP greater than 160      Allergies    No Known Allergies    Intolerances        REVIEW OF SYSTEMS:  CONSTITUTIONAL: No fever, weight loss, or fatigue  EYES: No eye pain, visual disturbances, or discharge  ENMT:  No difficulty hearing, tinnitus, vertigo; No sinus or throat pain  NECK: No pain or stiffness  BREASTS: No pain, masses, or nipple discharge  RESPIRATORY: No cough, wheezing, chills or hemoptysis; No shortness of breath  CARDIOVASCULAR: No chest pain, palpitations, dizziness, or leg swelling  GASTROINTESTINAL: No abdominal or epigastric pain. No nausea, vomiting, or hematemesis; No diarrhea or constipation. No melena or hematochezia.  GENITOURINARY: No dysuria, frequency, hematuria, or incontinence  NEUROLOGICAL: No headaches, memory loss, loss of strength, numbness, or tremors  SKIN: No itching, burning, rashes, or lesions   LYMPH NODES: No enlarged glands  ENDOCRINE: No heat or cold intolerance; No hair loss  MUSCULOSKELETAL: No joint pain or swelling; No muscle, back, or extremity pain  PSYCHIATRIC: No depression, anxiety, mood swings, or difficulty sleeping  HEME/LYMPH: No easy bruising, or bleeding gums  ALLERY AND IMMUNOLOGIC: No hives or eczema    Vital Signs Last 24 Hrs  T(C): 36.4 (27 Sep 2017 04:13), Max: 36.4 (26 Sep 2017 10:20)  T(F): 97.6 (27 Sep 2017 04:13), Max: 97.6 (27 Sep 2017 04:13)  HR: 69 (27 Sep 2017 04:13) (66 - 89)  BP: 128/76 (27 Sep 2017 04:13) (128/76 - 145/85)  BP(mean): --  RR: 18 (27 Sep 2017 04:13) (17 - 18)  SpO2: 96% (27 Sep 2017 04:13) (96% - 98%)    PHYSICAL EXAM:  GENERAL: NAD, well-groomed, well-developed  HEAD:  Atraumatic, Normocephalic  EYES: EOMI, PERRLA, conjunctiva and sclera clear  ENMT: No tonsillar erythema, exudates, or enlargement; Moist mucous membranes, Good dentition, No lesions  NECK: Supple, No JVD, Normal thyroid  NERVOUS SYSTEM:  Alert & Oriented X3, Good concentration; Motor Strength 5/5 B/L upper and lower extremities; DTRs 2+ intact and symmetric  CHEST/LUNG: Clear to percussion bilaterally; No rales, rhonchi, wheezing, or rubs  HEART: Regular rate and rhythm; No murmurs, rubs, or gallops  ABDOMEN: Soft, Nontender, Nondistended; Bowel sounds present  EXTREMITIES:  2+ Peripheral Pulses, No clubbing, cyanosis, or edema  LYMPH: No lymphadenopathy noted  SKIN: No rashes or lesions    LABS:                        10.2   8.0   )-----------( 242      ( 27 Sep 2017 06:29 )             30.8     09-27    144  |  113<H>  |  27<H>  ----------------------------<  110<H>  4.0   |  22  |  1.37<H>    Ca    8.2<L>      27 Sep 2017 06:29  Phos  2.9     09-27  Mg     2.6     09-27    TPro  6.2  /  Alb  2.3<L>  /  TBili  0.7  /  DBili  x   /  AST  33  /  ALT  55  /  AlkPhos  196<H>  09-27          CAPILLARY BLOOD GLUCOSE  98 (27 Sep 2017 07:20)  119 (26 Sep 2017 22:40)  118 (26 Sep 2017 15:27)  126 (26 Sep 2017 11:01)                    RADIOLOGY & ADDITIONAL TESTS:    Imaging Personally Reviewed:  [ ] YES  [ ] NO    Consultant(s) Notes Reviewed:  [ ] YES  [ ] NO    Care Discussed with Consultants/Other Providers [x ] YES  [ ] NO    Care discussed with family,         [  ]   yes  [  ]  No    imp:    stable[x ]    unstable[  ]     improving [   ]       unchanged  [  ]                Plans:  Continue present plans  [ x ] Medically stable                New consult [  ]   specialty  .......               order test[  ]    test name.                  Discharge Planning  [  ]

## 2017-09-27 NOTE — PROGRESS NOTE ADULT - SUBJECTIVE AND OBJECTIVE BOX
Patient seen and examined in chair in no distress.  Patient has no complaints today.  Admits to tolerating regular diet; denies abdominal pain/nausea/vomiting.  Patient admits to BM this AM.  Denies fever, chills, chest pain, sob, dizziness/lightheadedness.    Vital Signs Last 24 Hrs  T(F): 98.3 (09-27-17 @ 10:00), Max: 98.3 (09-27-17 @ 10:00)  HR: 66 (09-27-17 @ 10:00)  BP: 150/87 (09-27-17 @ 10:00)  RR: 18 (09-27-17 @ 10:00)  SpO2: 97% (09-27-17 @ 10:00)    GENERAL: Alert, oriented, NAD  CHEST/LUNG: Clear to auscultation bilaterally, respirations nonlabored  HEART: S1S2, Regular rate and rhythm  ABDOMEN: + Bowel sounds. RUQ incision and umbilical incision with intermittent staples, no erythema. Soft, nondistended, nontender. Right TONY drain in place with minimal serosanguinous output, 25cc/24hrs.   EXTREMITIES: No calf tenderness, no pedal edema b/l     LABS:                        10.2   8.0   )-----------( 242      ( 27 Sep 2017 06:29 )             30.8     09-27    144  |  113<H>  |  27<H>  ----------------------------<  110<H>  4.0   |  22  |  1.37<H>    Ca    8.2<L>      27 Sep 2017 06:29  Phos  2.9     09-27  Mg     2.6     09-27    TPro  6.2  /  Alb  2.3<L>  /  TBili  0.7  /  DBili  x   /  AST  33  /  ALT  55  /  AlkPhos  196<H>  09-27    Impression: 82 year old male PMH CAD, DM, HTN, HLD, MI POD #9 s/p laparoscopic converted to open cholecystectomy secondary to acute cholecystitis, started on PPN, with post-op klebsiella bacteremia, hypotension (resolved), MALATHI (improving), and elevated LFTs (improving)  Plan:  - Continue low fat diet as tolerated  - LFTs and lipase ordered STAT, will follow up results   - Awaiting final recommendations from ID regarding discharge antibiotics  - Medical note appreciated; patient medically cleared for discharge   - Will d/c TONY drain on discharge   - DVT prophylaxis, incentive spirometer, pain management PRN   - Continue cardio f/u  - Discussed with Dr. Sheffield Patient seen and examined in chair in no distress.  Patient has no complaints at all.  Admits to tolerating regular diet; denies abdominal pain/nausea/vomiting.  Patient admits to BM this AM.  Denies fever, chills, chest pain, sob, dizziness/lightheadedness.    Vital Signs Last 24 Hrs  T(F): 98.3 (09-27-17 @ 10:00), Max: 98.3 (09-27-17 @ 10:00)  HR: 66 (09-27-17 @ 10:00)  BP: 150/87 (09-27-17 @ 10:00)  RR: 18 (09-27-17 @ 10:00)  SpO2: 97% (09-27-17 @ 10:00)    GENERAL: Alert, oriented, NAD  CHEST/LUNG: Clear to auscultation bilaterally, respirations nonlabored  HEART: S1S2, Regular rate and rhythm  ABDOMEN: + Bowel sounds. RUQ incision and umbilical incision with intermittent staples, no erythema. Soft, nondistended, nontender. Right TONY drain in place with minimal serous output, 25cc/24hrs.   EXTREMITIES: No calf tenderness, no pedal edema b/l     LABS:                        10.2   8.0   )-----------( 242      ( 27 Sep 2017 06:29 )             30.8     09-27    144  |  113<H>  |  27<H>  ----------------------------<  110<H>  4.0   |  22  |  1.37<H>    Ca    8.2<L>      27 Sep 2017 06:29  Phos  2.9     09-27  Mg     2.6     09-27    TPro  6.2  /  Alb  2.3<L>  /  TBili  0.7  /  DBili  x   /  AST  33  /  ALT  55  /  AlkPhos  196<H>  09-27    Impression: 82 year old male PMH CAD, DM, HTN, HLD, MI POD #9 s/p laparoscopic converted to open cholecystectomy secondary to acute cholecystitis, started on PPN, with post-op klebsiella bacteremia, hypotension (resolved), MALATHI (improving), and elevated LFTs (improving)  Plan:  - Continue low fat diet as tolerated  - LFTs and lipase ordered STAT, will follow up results   - Awaiting final recommendations from ID regarding discharge antibiotics  - Medical note appreciated; patient medically cleared for discharge   - Will d/c TONY drain on discharge   - DVT prophylaxis, incentive spirometer, pain management PRN   - Continue cardio f/u  - Discussed with Dr. Sheffield

## 2017-09-28 ENCOUNTER — TRANSCRIPTION ENCOUNTER (OUTPATIENT)
Age: 82
End: 2017-09-28

## 2017-09-28 VITALS
TEMPERATURE: 99 F | DIASTOLIC BLOOD PRESSURE: 76 MMHG | OXYGEN SATURATION: 97 % | HEART RATE: 77 BPM | SYSTOLIC BLOOD PRESSURE: 136 MMHG | RESPIRATION RATE: 18 BRPM

## 2017-09-28 LAB
ALBUMIN SERPL ELPH-MCNC: 2.4 G/DL — LOW (ref 3.3–5)
ALP SERPL-CCNC: 179 U/L — HIGH (ref 40–120)
ALT FLD-CCNC: 55 U/L — SIGNIFICANT CHANGE UP (ref 12–78)
ANION GAP SERPL CALC-SCNC: 7 MMOL/L — SIGNIFICANT CHANGE UP (ref 5–17)
AST SERPL-CCNC: 33 U/L — SIGNIFICANT CHANGE UP (ref 15–37)
BILIRUB SERPL-MCNC: 0.7 MG/DL — SIGNIFICANT CHANGE UP (ref 0.2–1.2)
BUN SERPL-MCNC: 24 MG/DL — HIGH (ref 7–23)
CALCIUM SERPL-MCNC: 8.4 MG/DL — LOW (ref 8.5–10.1)
CHLORIDE SERPL-SCNC: 113 MMOL/L — HIGH (ref 96–108)
CO2 SERPL-SCNC: 23 MMOL/L — SIGNIFICANT CHANGE UP (ref 22–31)
CREAT SERPL-MCNC: 1.43 MG/DL — HIGH (ref 0.5–1.3)
GLUCOSE SERPL-MCNC: 93 MG/DL — SIGNIFICANT CHANGE UP (ref 70–99)
HCT VFR BLD CALC: 32.8 % — LOW (ref 39–50)
HGB BLD-MCNC: 11 G/DL — LOW (ref 13–17)
LIDOCAIN IGE QN: 1172 U/L — HIGH (ref 73–393)
MAGNESIUM SERPL-MCNC: 2.5 MG/DL — SIGNIFICANT CHANGE UP (ref 1.6–2.6)
MCHC RBC-ENTMCNC: 31.4 PG — SIGNIFICANT CHANGE UP (ref 27–34)
MCHC RBC-ENTMCNC: 33.6 GM/DL — SIGNIFICANT CHANGE UP (ref 32–36)
MCV RBC AUTO: 93.5 FL — SIGNIFICANT CHANGE UP (ref 80–100)
PHOSPHATE SERPL-MCNC: 2.8 MG/DL — SIGNIFICANT CHANGE UP (ref 2.5–4.5)
PLATELET # BLD AUTO: 186 K/UL — SIGNIFICANT CHANGE UP (ref 150–400)
POTASSIUM SERPL-MCNC: 4.3 MMOL/L — SIGNIFICANT CHANGE UP (ref 3.5–5.3)
POTASSIUM SERPL-SCNC: 4.3 MMOL/L — SIGNIFICANT CHANGE UP (ref 3.5–5.3)
PROT SERPL-MCNC: 6.5 GM/DL — SIGNIFICANT CHANGE UP (ref 6–8.3)
RBC # BLD: 3.5 M/UL — LOW (ref 4.2–5.8)
RBC # FLD: 13.1 % — SIGNIFICANT CHANGE UP (ref 11–15)
SODIUM SERPL-SCNC: 143 MMOL/L — SIGNIFICANT CHANGE UP (ref 135–145)
WBC # BLD: 8 K/UL — SIGNIFICANT CHANGE UP (ref 3.8–10.5)
WBC # FLD AUTO: 8 K/UL — SIGNIFICANT CHANGE UP (ref 3.8–10.5)

## 2017-09-28 RX ORDER — FUROSEMIDE 40 MG
20 TABLET ORAL ONCE
Qty: 0 | Refills: 0 | Status: COMPLETED | OUTPATIENT
Start: 2017-09-28 | End: 2017-09-28

## 2017-09-28 RX ORDER — CARVEDILOL PHOSPHATE 80 MG/1
6.25 CAPSULE, EXTENDED RELEASE ORAL EVERY 12 HOURS
Qty: 0 | Refills: 0 | Status: DISCONTINUED | OUTPATIENT
Start: 2017-09-28 | End: 2017-09-28

## 2017-09-28 RX ORDER — CARVEDILOL PHOSPHATE 80 MG/1
1 CAPSULE, EXTENDED RELEASE ORAL
Qty: 0 | Refills: 0 | COMMUNITY

## 2017-09-28 RX ORDER — FUROSEMIDE 40 MG
40 TABLET ORAL DAILY
Qty: 0 | Refills: 0 | Status: DISCONTINUED | OUTPATIENT
Start: 2017-09-28 | End: 2017-09-28

## 2017-09-28 RX ORDER — CARVEDILOL PHOSPHATE 80 MG/1
1 CAPSULE, EXTENDED RELEASE ORAL
Qty: 28 | Refills: 0 | OUTPATIENT
Start: 2017-09-28

## 2017-09-28 RX ORDER — FUROSEMIDE 40 MG
1 TABLET ORAL
Qty: 14 | Refills: 0 | OUTPATIENT
Start: 2017-09-28

## 2017-09-28 RX ADMIN — PANTOPRAZOLE SODIUM 40 MILLIGRAM(S): 20 TABLET, DELAYED RELEASE ORAL at 11:34

## 2017-09-28 RX ADMIN — Medication 20 MILLIGRAM(S): at 11:33

## 2017-09-28 RX ADMIN — PIPERACILLIN AND TAZOBACTAM 25 GRAM(S): 4; .5 INJECTION, POWDER, LYOPHILIZED, FOR SOLUTION INTRAVENOUS at 06:31

## 2017-09-28 RX ADMIN — HEPARIN SODIUM 5000 UNIT(S): 5000 INJECTION INTRAVENOUS; SUBCUTANEOUS at 06:31

## 2017-09-28 RX ADMIN — PIPERACILLIN AND TAZOBACTAM 25 GRAM(S): 4; .5 INJECTION, POWDER, LYOPHILIZED, FOR SOLUTION INTRAVENOUS at 13:46

## 2017-09-28 NOTE — CHART NOTE - NSCHARTNOTESELECT_GEN_ALL_CORE
surg PA
Event Note
Event Note/RRT
Surg PA

## 2017-09-28 NOTE — PROGRESS NOTE ADULT - PROVIDER SPECIALTY LIST ADULT
Cardiology
Gastroenterology
Heme/Onc
Hospitalist
Internal Medicine
Surgery
Gastroenterology
Surgery
Infectious Disease
Infectious Disease

## 2017-09-28 NOTE — CHART NOTE - NSCHARTNOTEFT_GEN_A_CORE
Discussed cardiac and blood pressure medications with consulting cardiologist regarding discharge planning. Advised to continue only inpatient lasix at current dose of 40mg QD and to change home Coreg dose from 12.5mg BID to 3.125mg BID due to hypotension while hospitalized. Will not continue any other home cardiac medications at this time. Cardiology recommends follow up as an outpatient within one week of discharge with either outpatient Cardiologist or with Dr. Watson for adjustments as needed.

## 2017-09-28 NOTE — PROGRESS NOTE ADULT - PROBLEM SELECTOR PROBLEM 1
Calculus of gallbladder with acute cholecystitis and obstruction
Diabetes mellitus type 2, diet-controlled
Diabetes mellitus type 2, diet-controlled
LFTs abnormal
Calculus of gallbladder with acute cholecystitis and obstruction
Cholecystitis

## 2017-09-28 NOTE — PROGRESS NOTE ADULT - SUBJECTIVE AND OBJECTIVE BOX
Assessment:    H/O CAD  Last cath 2015 at that time medical therapy was recommended  Echo 2015 EF 25-30% is chronic and well managed with medical therapy  Troponin negative  Chronic systolic CHF is noted from chart with EF 25%, AICD already in place  Acute Ruthy, stable post cholecystectomy, surgical f/u noted  SBP now stable  NSVT acceptable, patient has AICD already, K given  DC plan

## 2017-09-28 NOTE — PROGRESS NOTE ADULT - ATTENDING COMMENTS
I examine patient, events noted and discussed with PA and Dr. Victor.  Actually patient on chair, denies any pain, no nausea, no vomiting. Had total 3 BM's.  Labs: + Bacteremia, Klebbsiela.   Has been afebrile.  Abdomen soft, no tender, no distended. TONY, serous fluid.  Ext: no edema.  Assessment:     Bacteremia. Possible Cholangitis.  Hypotension resolved.  Acute Renal Insuficiency.    Plan:  Continue IV antibiotics. Awaiting ID consult.  IVF hydration.  Hold BP meds.  Cardiology Follow up.  Monitor labs.
I examined patient at bed site.  I had a large conversation with patient, wife and daughter at bed site.  Dx, patient's condition, surgical procedure, risks, benefits, complications, intra and post-op period.  They understood, multiple questions were answered, all agree with plan.    Lap-cholecystectomy, possible open on Monday.
I examined patient, I agree with note.
I examined patient, agree with note.  Patient comfortable, minimal pain, Abdomen soft. TONY decreasing drainage  60 ml/24 h.  Will start oral liquids in am.  Continue PPN.  PT, Spirometry, DVT prophylaxis.
I examined patient, agree with note.  still elevated Lipase, but patient asymptomatic. etiology ?  Possible, post-op vs Medication.  Will repeat Labs in one week.  Patient stable for discharge.
I examined patient at bed site, states had shivering this am, actually , temp. 101 female  Denies abdominal pain, nausea or vomiting.  Ambulating well.  Had a large formed BM this am, + flatus.  Abdomen soft, non distended, no tenderness, no guarding.  Wound clean, intact, no erythemas. TONY, serous sanguinous fluid, small amount.  Labs: mild elevation of LFT's.  WBC normal.  CXR, no consolidation,  AXR normal gas pattern.  Negative Venous doppler for DVT.    Plan:  Continue IV Antibiotics, Zosyn, give one dose of Vanco. PPN  Encourage Incentive spirometry, Continue DVT prophylaxis.  F/U BC, UC.  Monitor LFT's.  ID consult.  Continue Medical, Cardiology F/U.
I examined patient at bed site. denies any complaints, NO abdominal pain, no nausea, no vomiting. tolerating solid diet.  Abdomen soft, non distended, no tenderness. Wound clean, no erythema.  TONY:  minimal serous drainage. I removed TONY, sterile  dressing applied.    Labs. normal T. bili, mild elevated Alk Phosp, Elevated Lipase, patient asymptomatic.    Plan:    Repeat Lipase in am.  Possible D/C patient home if Lipase results the same or lower  Medical and Cardiology f/u
I examined patient, agree with note.  Patient ambulating, denies any complaint.  Afebrile, Abdomen soft, normal WBC, improving LFt's. and Renal function.    Plan:  PO diet as tolerated.  D/C PPN.  D/C planning.
I examined patient, denies abdominal pain, LFT's  improving, Cardiology evaluation appreciated.  We discussed clinical Dx and plan, Surgical procedure Indicated, Risks and benefits and complications explained.  Patient wants to discuss plan with his wife.  Case booked fo Monday 9/18/17.  Will advance diet, continue current management.
I examined patient, sited on chair, states feeling better on chair, denies SOB, CP, moderate Incisional pain.  Abdomen soft, non distended, no guarding,  TONY, serosanguinous fluid.  Ext: no calf tenderness, no edema.  CXR: no acute path.    Continue care, Cardiology Follow up, Medical Follow up.  OOB, PT, Ambulation.  Monitor TONY output.
Patient stable, complaints of epigastric burning sensation, after potassium oral meds.  Denies nausea or vomiting.  + Flatus , + BM.  Plan:  Clamp NGT, Clear liquids diet as tolerated.
Agree with note
I examined patient at bed site, comfortable, denies any complaints, tolerating diet, has + flatus and 2 new BM's.  Ambulating with assistance.  Abdomen soft, no tenderness at all.  Wounds D/C/I, no sign of infection.  Labs :  Normal WBC, mild elevated Alk Phosp, elevated Lipase, but patient with no abdominal pain or tenderness.             Renal function improving, Cr. 1.6.    Plan:  Low fat diet as tolerated.  IVF low rate.  Continue Antibiotics as per ID.  D/C planning for tomorrow.  New labs in am.

## 2017-09-28 NOTE — PROGRESS NOTE ADULT - SUBJECTIVE AND OBJECTIVE BOX
Patient is a 82y old  Male who presents with a chief complaint of RUQ pain (26 Sep 2017 23:27)  recovering after  open cholecystectomy. lipase levels still elevated. patient has no abdominal pains    INTERVAL HPI/OVERNIGHT EVENTS: has  some leg swelling    MEDICATIONS  (STANDING):  heparin  Injectable 5000 Unit(s) SubCutaneous every 12 hours  insulin lispro (HumaLOG) corrective regimen sliding scale   SubCutaneous three times a day before meals  insulin lispro (HumaLOG) corrective regimen sliding scale   SubCutaneous at bedtime  dextrose 50% Injectable 12.5 Gram(s) IV Push once  dextrose 50% Injectable 25 Gram(s) IV Push once  dextrose 50% Injectable 25 Gram(s) IV Push once  pantoprazole  Injectable 40 milliGRAM(s) IV Push daily  piperacillin/tazobactam IVPB. 3.375 Gram(s) IV Intermittent every 8 hours    MEDICATIONS  (PRN):  ondansetron Injectable 4 milliGRAM(s) IV Push every 6 hours PRN Nausea  dextrose Gel 1 Dose(s) Oral once PRN Blood Glucose LESS THAN 70 milliGRAM(s)/deciliter  glucagon  Injectable 1 milliGRAM(s) IntraMuscular once PRN Glucose LESS THAN 70 milligrams/deciliter  acetaminophen   Tablet 650 milliGRAM(s) Oral every 6 hours PRN For Temp greater than 38 C (100.4 F)  hydrALAZINE Injectable 10 milliGRAM(s) IV Push every 6 hours PRN onl;y for SBP greater than 160      Allergies    No Known Allergies    Intolerances        REVIEW OF SYSTEMS:  CONSTITUTIONAL: No fever, weight loss, or fatigue  EYES: No eye pain, visual disturbances, or discharge  ENMT:  No difficulty hearing, tinnitus, vertigo; No sinus or throat pain  NECK: No pain or stiffness  BREASTS: No pain, masses, or nipple discharge  RESPIRATORY: No cough, wheezing, chills or hemoptysis; No shortness of breath  CARDIOVASCULAR: No chest pain, palpitations, dizziness, or leg swelling  GASTROINTESTINAL: No abdominal or epigastric pain. No nausea, vomiting, or hematemesis; No diarrhea or constipation. No melena or hematochezia.  GENITOURINARY: No dysuria, frequency, hematuria, or incontinence  NEUROLOGICAL: No headaches, memory loss, loss of strength, numbness, or tremors  SKIN: No itching, burning, rashes, or lesions   LYMPH NODES: No enlarged glands  ENDOCRINE: No heat or cold intolerance; No hair loss  MUSCULOSKELETAL: No joint pain or swelling; No muscle, back, or extremity pain  PSYCHIATRIC: No depression, anxiety, mood swings, or difficulty sleeping  HEME/LYMPH: No easy bruising, or bleeding gums  ALLERY AND IMMUNOLOGIC: No hives or eczema    Vital Signs Last 24 Hrs  T(C): 37.1 (28 Sep 2017 04:44), Max: 37.7 (28 Sep 2017 00:28)  T(F): 98.8 (28 Sep 2017 04:44), Max: 99.8 (28 Sep 2017 00:28)  HR: 65 (28 Sep 2017 04:44) (65 - 80)  BP: 128/64 (28 Sep 2017 04:44) (128/64 - 141/79)  BP(mean): --  RR: 18 (28 Sep 2017 04:44) (18 - 18)  SpO2: 96% (28 Sep 2017 04:44) (96% - 97%)    PHYSICAL EXAM:  GENERAL: NAD, well-groomed, well-developed  HEAD:  Atraumatic, Normocephalic  EYES: EOMI, PERRLA, conjunctiva and sclera clear  ENMT: No tonsillar erythema, exudates, or enlargement; Moist mucous membranes, Good dentition, No lesions  NECK: Supple, No JVD, Normal thyroid  NERVOUS SYSTEM:  Alert & Oriented X3, Good concentration; Motor Strength 5/5 B/L upper and lower extremities; DTRs 2+ intact and symmetric  CHEST/LUNG: Clear to percussion bilaterally; No rales, rhonchi, wheezing, or rubs  HEART: Regular rate and rhythm; No murmurs, rubs, or gallops  ABDOMEN: Soft, Nontender, Nondistended; Bowel sounds present  EXTREMITIES:  2+ Peripheral Pulses, No clubbing, cyanosis, or edema  LYMPH: No lymphadenopathy noted  SKIN: No rashes or lesions    LABS:                        11.0   8.0   )-----------( 186      ( 28 Sep 2017 07:07 )             32.8     09-28    143  |  113<H>  |  24<H>  ----------------------------<  93  4.3   |  23  |  1.43<H>    Ca    8.4<L>      28 Sep 2017 07:07  Phos  2.8     09-28  Mg     2.5     09-28    TPro  6.5  /  Alb  2.4<L>  /  TBili  0.7  /  DBili  x   /  AST  33  /  ALT  55  /  AlkPhos  179<H>  09-28          CAPILLARY BLOOD GLUCOSE  117 (27 Sep 2017 10:37)                    RADIOLOGY & ADDITIONAL TESTS:    Imaging Personally Reviewed:  [ ] YES  [ ] NO    Consultant(s) Notes Reviewed:  [ ] YES  [ ] NO    Care Discussed with Consultants/Other Providers [ ] YES  [ ] NO    Care discussed with family,         [  ]   yes  [  ]  No    imp:    stable[ ]    unstable[  ]     improving [ x  ]       unchanged  [  ]                Plans:  Continue present plans  [ x ] as per surgery. One dose of lasix today               New consult [  ]   specialty  .......               order test[  ]    test name.                  Discharge Planning  [  ]

## 2017-09-28 NOTE — PROGRESS NOTE ADULT - NSHPATTENDINGPLANDISCUSS_GEN_ALL_CORE
patient/ RN
pateint
patient
BERTO Madrid, RN, Patient.
Patient, PA. RN, GI
Patient, PA. RN, family
Patient, family, PA, RN
Patient, wife, daughter, PAZach NEWELL.
Patient, PA, Dr. Valente MD. PA, RN
Patient, PA, GI, Cardiology.
Patient, PA, RN, Dr. Lambert
Patient, PA, RN.
Patient, PA.
Patient, PA. RN, floor management.
Patient, family, PA. RN
Patient, PA, GI NP, RN

## 2017-09-28 NOTE — PROGRESS NOTE ADULT - SUBJECTIVE AND OBJECTIVE BOX
Gastroenterology  Patient seen and examined bedside resting comfortably.  No complaints offered. mild fullness  No abdominal pain  Denies nausea and vomiting. Tolerating diet.  Normal flatus/BM.     T(F): 98.8 (09-28-17 @ 04:44), Max: 99.8 (09-28-17 @ 00:28)  HR: 65 (09-28-17 @ 04:44) (65 - 80)  BP: 128/64 (09-28-17 @ 04:44) (128/64 - 150/87)  RR: 18 (09-28-17 @ 04:44) (18 - 18)  SpO2: 96% (09-28-17 @ 04:44) (96% - 97%)  Wt(kg): --  CAPILLARY BLOOD GLUCOSE  117 (27 Sep 2017 10:37)          PHYSICAL EXAM:  General: NAD, WDWN.   Neuro:  Alert & oriented x 3  HEENT: NCAT, EOMI, conjunctiva clear  CV: +S1+S2 regular rate and rhythm  Lung: clear to ausculation bilaterally, respirations nonlabored, good inspiratory effort  Abdomen: soft, NonTender, No distention Normal active BS  Extremities: no cyanosis, clubbing or edema    LABS:                        11.0   8.0   )-----------( 186      ( 28 Sep 2017 07:07 )             32.8     09-28    143  |  113<H>  |  24<H>  ----------------------------<  93  4.3   |  23  |  1.43<H>    Ca    8.4<L>      28 Sep 2017 07:07  Phos  2.8     09-28  Mg     2.5     09-28    TPro  6.5  /  Alb  2.4<L>  /  TBili  0.7  /  DBili  x   /  AST  33  /  ALT  55  /  AlkPhos  179<H>  09-28    LIVER FUNCTIONS - ( 28 Sep 2017 07:07 )  Alb: 2.4 g/dL / Pro: 6.5 gm/dL / ALK PHOS: 179 U/L / ALT: 55 U/L / AST: 33 U/L / GGT: x             I&O's Detail    27 Sep 2017 07:01  -  28 Sep 2017 07:00  --------------------------------------------------------  IN:    Oral Fluid: 480 mL    Solution: 200 mL  Total IN: 680 mL    OUT:  Total OUT: 0 mL    Total NET: 680 mL        09-28 @ 07:07    143 | 113 | 24  /8.4 | 2.5 | 2.8  _______________________/  4.3 | 23 | 1.43                           \par   Lipase, Serum: 1172 U/L (09-28 @ 07:07)

## 2017-09-28 NOTE — PROGRESS NOTE ADULT - SUBJECTIVE AND OBJECTIVE BOX
Patient seen and examined in chair at bedside complaining of extra water on his body. Admits to flatus/BM. Denies pain, nausea/vomiting. Tolerating diet.    Vital Signs Last 24 Hrs  T(F): 98.8 (09-28-17 @ 12:20), Max: 99.8 (09-28-17 @ 00:28)  HR: 77 (09-28-17 @ 12:20)  BP: 136/76 (09-28-17 @ 12:20)  RR: 18 (09-28-17 @ 12:20)  SpO2: 97% (09-28-17 @ 12:20)  CAPILLARY BLOOD GLUCOSE: 117 (27 Sep 2017 10:37)    GENERAL: Alert, NAD  CHEST/LUNG: Clear to auscultation bilaterally, respirations nonlabored  HEART: S1S2, Regular rate and rhythm;   ABDOMEN: + Bowel sounds, soft, Nontender, Nondistended  EXTREMITIES:  no calf tenderness, No edema    I&O's Detail    27 Sep 2017 07:01  -  28 Sep 2017 07:00  --------------------------------------------------------  IN:    Oral Fluid: 480 mL    Solution: 200 mL  Total IN: 680 mL    OUT:  Total OUT: 0 mL    Total NET: 680 mL    LABS:                        11.0   8.0   )-----------( 186      ( 28 Sep 2017 07:07 )             32.8     09-28    143  |  113<H>  |  24<H>  ----------------------------<  93  4.3   |  23  |  1.43<H>    Ca    8.4<L>      28 Sep 2017 07:07  Phos  2.8     09-28  Mg     2.5     09-28    TPro  6.5  /  Alb  2.4<L>  /  TBili  0.7  /  DBili  x   /  AST  33  /  ALT  55  /  AlkPhos  179<H>  09-28    Lipase, Serum: 1172 U/L (09.28.17 @ 07:07)    82y old  Male s/p laparoscopic converted to open cholecystectomy secondary to acute on chronic cholecystitis POD#10 complicated by Klebsiella bacteremia and mild lipase elevation. PMH Pneumonia, CAD, Myocardial infarct, BPH, Diabetes mellitus type 2, diet-controlled, Dyslipidemia, Hypertension.    - discharge planning later today  - will send home on po Levaquin 250mg BID until 10/6  - follow up with Dr. Sheffield in 7-10 days for evaluation and repeat lipase  - will discuss with cardiology regarding discharge blood pressure and cardiac medications  - discussed with Dr. Sheffield Patient seen and examined in chair at bedside complaining of extra water on his body. Admits to flatus/BM. Denies pain, nausea/vomiting. Tolerating diet.    Vital Signs Last 24 Hrs  T(F): 98.8 (09-28-17 @ 12:20), Max: 99.8 (09-28-17 @ 00:28)  HR: 77 (09-28-17 @ 12:20)  BP: 136/76 (09-28-17 @ 12:20)  RR: 18 (09-28-17 @ 12:20)  SpO2: 97% (09-28-17 @ 12:20)  CAPILLARY BLOOD GLUCOSE: 117 (27 Sep 2017 10:37)    GENERAL: Alert, NAD  CHEST/LUNG: Clear to auscultation bilaterally, respirations nonlabored  HEART: S1S2, Regular rate and rhythm;   ABDOMEN: + Bowel sounds, soft, Nontender, Nondistended  EXTREMITIES:  no calf tenderness, No edema    I&O's Detail    27 Sep 2017 07:01  -  28 Sep 2017 07:00  --------------------------------------------------------  IN:    Oral Fluid: 480 mL    Solution: 200 mL  Total IN: 680 mL    OUT:  Total OUT: 0 mL    Total NET: 680 mL    LABS:                        11.0   8.0   )-----------( 186      ( 28 Sep 2017 07:07 )             32.8     09-28    143  |  113<H>  |  24<H>  ----------------------------<  93  4.3   |  23  |  1.43<H>    Ca    8.4<L>      28 Sep 2017 07:07  Phos  2.8     09-28  Mg     2.5     09-28    TPro  6.5  /  Alb  2.4<L>  /  TBili  0.7  /  DBili  x   /  AST  33  /  ALT  55  /  AlkPhos  179<H>  09-28    Lipase, Serum: 1172 U/L (09.28.17 @ 07:07)    82y old  Male s/p laparoscopic converted to open cholecystectomy secondary to acute on chronic cholecystitis POD#10 complicated by Klebsiella bacteremia and mild lipase elevation. PMH Pneumonia, CAD, Myocardial infarct, BPH, Diabetes mellitus type 2, diet-controlled, Dyslipidemia, Hypertension.    - discharge planning later today  - will send home on po Levaquin 250mg QD until 10/6  - follow up with Dr. Sheffield in 7-10 days for evaluation and repeat lipase  - will discuss with cardiology regarding discharge blood pressure and cardiac medications  - discussed with Dr. Sheffield

## 2017-10-02 DIAGNOSIS — R50.82 POSTPROCEDURAL FEVER: ICD-10-CM

## 2017-10-02 DIAGNOSIS — I25.10 ATHEROSCLEROTIC HEART DISEASE OF NATIVE CORONARY ARTERY WITHOUT ANGINA PECTORIS: ICD-10-CM

## 2017-10-02 DIAGNOSIS — I50.22 CHRONIC SYSTOLIC (CONGESTIVE) HEART FAILURE: ICD-10-CM

## 2017-10-02 DIAGNOSIS — R78.81 BACTEREMIA: ICD-10-CM

## 2017-10-02 DIAGNOSIS — K80.13 CALCULUS OF GALLBLADDER WITH ACUTE AND CHRONIC CHOLECYSTITIS WITH OBSTRUCTION: ICD-10-CM

## 2017-10-02 DIAGNOSIS — K83.0 CHOLANGITIS: ICD-10-CM

## 2017-10-02 DIAGNOSIS — Z95.810 PRESENCE OF AUTOMATIC (IMPLANTABLE) CARDIAC DEFIBRILLATOR: ICD-10-CM

## 2017-10-02 DIAGNOSIS — I13.0 HYPERTENSIVE HEART AND CHRONIC KIDNEY DISEASE WITH HEART FAILURE AND STAGE 1 THROUGH STAGE 4 CHRONIC KIDNEY DISEASE, OR UNSPECIFIED CHRONIC KIDNEY DISEASE: ICD-10-CM

## 2017-10-02 DIAGNOSIS — I25.2 OLD MYOCARDIAL INFARCTION: ICD-10-CM

## 2017-10-02 DIAGNOSIS — Z79.82 LONG TERM (CURRENT) USE OF ASPIRIN: ICD-10-CM

## 2017-10-02 DIAGNOSIS — Z87.891 PERSONAL HISTORY OF NICOTINE DEPENDENCE: ICD-10-CM

## 2017-10-02 DIAGNOSIS — E78.5 HYPERLIPIDEMIA, UNSPECIFIED: ICD-10-CM

## 2017-10-02 DIAGNOSIS — Z28.21 IMMUNIZATION NOT CARRIED OUT BECAUSE OF PATIENT REFUSAL: ICD-10-CM

## 2017-10-02 DIAGNOSIS — K42.9 UMBILICAL HERNIA WITHOUT OBSTRUCTION OR GANGRENE: ICD-10-CM

## 2017-10-02 DIAGNOSIS — E87.6 HYPOKALEMIA: ICD-10-CM

## 2017-10-02 DIAGNOSIS — I47.2 VENTRICULAR TACHYCARDIA: ICD-10-CM

## 2017-10-02 DIAGNOSIS — Z85.46 PERSONAL HISTORY OF MALIGNANT NEOPLASM OF PROSTATE: ICD-10-CM

## 2017-10-02 DIAGNOSIS — D72.829 ELEVATED WHITE BLOOD CELL COUNT, UNSPECIFIED: ICD-10-CM

## 2017-10-02 DIAGNOSIS — I95.9 HYPOTENSION, UNSPECIFIED: ICD-10-CM

## 2017-10-02 DIAGNOSIS — N18.9 CHRONIC KIDNEY DISEASE, UNSPECIFIED: ICD-10-CM

## 2017-10-02 DIAGNOSIS — K66.0 PERITONEAL ADHESIONS (POSTPROCEDURAL) (POSTINFECTION): ICD-10-CM

## 2017-10-02 DIAGNOSIS — E11.22 TYPE 2 DIABETES MELLITUS WITH DIABETIC CHRONIC KIDNEY DISEASE: ICD-10-CM

## 2017-10-02 DIAGNOSIS — B96.1 KLEBSIELLA PNEUMONIAE [K. PNEUMONIAE] AS THE CAUSE OF DISEASES CLASSIFIED ELSEWHERE: ICD-10-CM

## 2017-10-02 DIAGNOSIS — N17.0 ACUTE KIDNEY FAILURE WITH TUBULAR NECROSIS: ICD-10-CM

## 2017-10-02 DIAGNOSIS — N40.0 BENIGN PROSTATIC HYPERPLASIA WITHOUT LOWER URINARY TRACT SYMPTOMS: ICD-10-CM

## 2017-10-02 DIAGNOSIS — K21.9 GASTRO-ESOPHAGEAL REFLUX DISEASE WITHOUT ESOPHAGITIS: ICD-10-CM

## 2017-10-02 DIAGNOSIS — K56.7 ILEUS, UNSPECIFIED: ICD-10-CM

## 2017-10-30 ENCOUNTER — APPOINTMENT (OUTPATIENT)
Dept: ELECTROPHYSIOLOGY | Facility: CLINIC | Age: 82
End: 2017-10-30
Payer: MEDICARE

## 2017-10-30 VITALS
BODY MASS INDEX: 24.65 KG/M2 | OXYGEN SATURATION: 96 % | DIASTOLIC BLOOD PRESSURE: 80 MMHG | WEIGHT: 182 LBS | HEART RATE: 61 BPM | SYSTOLIC BLOOD PRESSURE: 134 MMHG | HEIGHT: 72 IN

## 2017-10-30 PROCEDURE — 99215 OFFICE O/P EST HI 40 MIN: CPT

## 2017-10-30 PROCEDURE — 93000 ELECTROCARDIOGRAM COMPLETE: CPT

## 2017-10-30 RX ORDER — FUROSEMIDE 20 MG/1
20 TABLET ORAL DAILY
Refills: 0 | Status: ACTIVE | COMMUNITY
Start: 2017-10-30

## 2017-10-30 RX ORDER — CARVEDILOL 6.25 MG/1
6.25 TABLET, FILM COATED ORAL TWICE DAILY
Qty: 180 | Refills: 3 | Status: ACTIVE | COMMUNITY
Start: 2017-10-30

## 2017-10-30 RX ORDER — VALSARTAN 80 MG/1
80 TABLET, COATED ORAL DAILY
Refills: 0 | Status: ACTIVE | COMMUNITY
Start: 2017-10-30

## 2018-02-01 ENCOUNTER — APPOINTMENT (OUTPATIENT)
Dept: ELECTROPHYSIOLOGY | Facility: CLINIC | Age: 83
End: 2018-02-01
Payer: MEDICARE

## 2018-02-01 PROCEDURE — 93295 DEV INTERROG REMOTE 1/2/MLT: CPT

## 2018-02-01 PROCEDURE — 93296 REM INTERROG EVL PM/IDS: CPT

## 2018-05-25 ENCOUNTER — APPOINTMENT (OUTPATIENT)
Dept: ELECTROPHYSIOLOGY | Facility: CLINIC | Age: 83
End: 2018-05-25
Payer: MEDICARE

## 2018-05-25 PROCEDURE — 93295 DEV INTERROG REMOTE 1/2/MLT: CPT

## 2018-08-21 ENCOUNTER — INBOUND DOCUMENT (OUTPATIENT)
Age: 83
End: 2018-08-21

## 2018-08-22 ENCOUNTER — OUTPATIENT (OUTPATIENT)
Dept: OUTPATIENT SERVICES | Facility: HOSPITAL | Age: 83
LOS: 1 days | End: 2018-08-22
Payer: COMMERCIAL

## 2018-08-22 VITALS
OXYGEN SATURATION: 96 % | WEIGHT: 197.98 LBS | SYSTOLIC BLOOD PRESSURE: 146 MMHG | HEART RATE: 62 BPM | DIASTOLIC BLOOD PRESSURE: 70 MMHG | RESPIRATION RATE: 16 BRPM | TEMPERATURE: 98 F

## 2018-08-22 DIAGNOSIS — Z98.890 OTHER SPECIFIED POSTPROCEDURAL STATES: Chronic | ICD-10-CM

## 2018-08-22 DIAGNOSIS — Z90.49 ACQUIRED ABSENCE OF OTHER SPECIFIED PARTS OF DIGESTIVE TRACT: Chronic | ICD-10-CM

## 2018-08-22 DIAGNOSIS — R09.89 OTHER SPECIFIED SYMPTOMS AND SIGNS INVOLVING THE CIRCULATORY AND RESPIRATORY SYSTEMS: ICD-10-CM

## 2018-08-22 DIAGNOSIS — I42.1 OBSTRUCTIVE HYPERTROPHIC CARDIOMYOPATHY: ICD-10-CM

## 2018-08-22 LAB
ALBUMIN SERPL ELPH-MCNC: 4.6 G/DL — SIGNIFICANT CHANGE UP (ref 3.3–5)
ALP SERPL-CCNC: 80 U/L — SIGNIFICANT CHANGE UP (ref 40–120)
ALT FLD-CCNC: 14 U/L — SIGNIFICANT CHANGE UP (ref 10–45)
ANION GAP SERPL CALC-SCNC: 12 MMOL/L — SIGNIFICANT CHANGE UP (ref 5–17)
AST SERPL-CCNC: 26 U/L — SIGNIFICANT CHANGE UP (ref 10–40)
BILIRUB SERPL-MCNC: 1.1 MG/DL — SIGNIFICANT CHANGE UP (ref 0.2–1.2)
BUN SERPL-MCNC: 27 MG/DL — HIGH (ref 7–23)
CALCIUM SERPL-MCNC: 9.9 MG/DL — SIGNIFICANT CHANGE UP (ref 8.4–10.5)
CHLORIDE SERPL-SCNC: 101 MMOL/L — SIGNIFICANT CHANGE UP (ref 96–108)
CO2 SERPL-SCNC: 25 MMOL/L — SIGNIFICANT CHANGE UP (ref 22–31)
CREAT SERPL-MCNC: 1.53 MG/DL — HIGH (ref 0.5–1.3)
GLUCOSE SERPL-MCNC: 119 MG/DL — HIGH (ref 70–99)
HCT VFR BLD CALC: 44.3 % — SIGNIFICANT CHANGE UP (ref 39–50)
HGB BLD-MCNC: 14.7 G/DL — SIGNIFICANT CHANGE UP (ref 13–17)
MCHC RBC-ENTMCNC: 30.6 PG — SIGNIFICANT CHANGE UP (ref 27–34)
MCHC RBC-ENTMCNC: 33.1 GM/DL — SIGNIFICANT CHANGE UP (ref 32–36)
MCV RBC AUTO: 92.4 FL — SIGNIFICANT CHANGE UP (ref 80–100)
PLATELET # BLD AUTO: 220 K/UL — SIGNIFICANT CHANGE UP (ref 150–400)
POTASSIUM SERPL-MCNC: 5 MMOL/L — SIGNIFICANT CHANGE UP (ref 3.5–5.3)
POTASSIUM SERPL-SCNC: 5 MMOL/L — SIGNIFICANT CHANGE UP (ref 3.5–5.3)
PROT SERPL-MCNC: 7.8 G/DL — SIGNIFICANT CHANGE UP (ref 6–8.3)
RBC # BLD: 4.79 M/UL — SIGNIFICANT CHANGE UP (ref 4.2–5.8)
RBC # FLD: 12.9 % — SIGNIFICANT CHANGE UP (ref 10.3–14.5)
SODIUM SERPL-SCNC: 138 MMOL/L — SIGNIFICANT CHANGE UP (ref 135–145)
WBC # BLD: 6.8 K/UL — SIGNIFICANT CHANGE UP (ref 3.8–10.5)
WBC # FLD AUTO: 6.8 K/UL — SIGNIFICANT CHANGE UP (ref 3.8–10.5)

## 2018-08-22 PROCEDURE — 93458 L HRT ARTERY/VENTRICLE ANGIO: CPT | Mod: 26

## 2018-08-22 PROCEDURE — C1769: CPT

## 2018-08-22 PROCEDURE — 93010 ELECTROCARDIOGRAM REPORT: CPT | Mod: 59

## 2018-08-22 PROCEDURE — 93005 ELECTROCARDIOGRAM TRACING: CPT

## 2018-08-22 PROCEDURE — C1894: CPT

## 2018-08-22 PROCEDURE — 80053 COMPREHEN METABOLIC PANEL: CPT

## 2018-08-22 PROCEDURE — 99152 MOD SED SAME PHYS/QHP 5/>YRS: CPT

## 2018-08-22 PROCEDURE — 85027 COMPLETE CBC AUTOMATED: CPT

## 2018-08-22 PROCEDURE — C1887: CPT

## 2018-08-22 PROCEDURE — 93458 L HRT ARTERY/VENTRICLE ANGIO: CPT

## 2018-08-22 RX ORDER — CARVEDILOL PHOSPHATE 80 MG/1
1 CAPSULE, EXTENDED RELEASE ORAL
Qty: 0 | Refills: 0 | COMMUNITY

## 2018-08-22 RX ORDER — IRBESARTAN 75 MG/1
1 TABLET ORAL
Qty: 0 | Refills: 0 | COMMUNITY

## 2018-08-22 RX ORDER — AMIODARONE HYDROCHLORIDE 400 MG/1
1 TABLET ORAL
Qty: 0 | Refills: 0 | COMMUNITY

## 2018-08-22 RX ORDER — OMEPRAZOLE 10 MG/1
1 CAPSULE, DELAYED RELEASE ORAL
Qty: 0 | Refills: 0 | COMMUNITY

## 2018-08-22 RX ORDER — FUROSEMIDE 40 MG
1 TABLET ORAL
Qty: 0 | Refills: 0 | COMMUNITY

## 2018-08-22 NOTE — H&P CARDIOLOGY - PSH
H/O prostate biopsy  During prostate biposy, a vessel was hit and wouldn't stop bleeding so patient had emergency prostate surgery. Exact procedure unknown by patient.  S/P cholecystectomy  Sep 2017  Skin lesion of right leg  with biopsy - benign

## 2018-08-22 NOTE — H&P CARDIOLOGY - HISTORY OF PRESENT ILLNESS
83 y/o male PMHx AICD/pacemaker (3 years ago), HTN, HLD, CAD/MI, DMT2 (diet controlled), EF of 25%, acid reflux, treated for prostate CA with radiation treatments feb 2016, s/p cholecystectomy in sep 2017, s/p stone removal in common bile duct in [facility]  in June 2018. Pt reports on 8/18 pt had his defibrillator fired while resting in the chair with lightheadedness preceding the incident, pt was evaluated with Dr Jeff and presents for LHC. Pt had diagnostic LHC in Nov 2014 with Dr Huffman, showed cccluded OM, critical RCA disease both mostly unchanged from prior cath (territories evaluated with nuc viability in 2012 and found not viable). Moderate LAD disease.  Pt denies any chest pain, dyspnea, dizziness, LE edema or weight gain or orthopnea recently.       Cards Dr Massey   PCP Dr Alves     < from: Cardiac Cath Lab - Adult (11.25.14 @ 16:12) >  CORONARY VESSELS: The coronary circulation is right dominant.  LM:   --  LM: There was a 25 % stenosis.  LAD:   --  Proximal LAD: There was a 50 % stenosis. The lesion was  eccentric and moderately calcified.  --  D1: Angiography showed minor luminal irregularities with no flow  limiting lesions.  CX:   --  Ostial circumflex: There was a 70 % stenosis.  --  OM1: There was a 100 % stenosis.  RCA:   --  Proximal RCA: There was a 99 % stenosis.  COMPLICATIONS: There were no complications.  DIAGNOSTIC IMPRESSIONS: Occluded OM, critical RCA disease both mostly  unchanged from prior cath (territories evaluated with nuc viability in  2012 and found not viable). Moderate LAD disease.  DIAGNOSTIC RECOMMENDATIONS: EP follow - up for VT. Aggressive medical  therapy for est CAD and LV dysfunction.  Prepared and signed by  Lorie Huffman M.D.    < end of copied text >      < from: Transthoracic Echocardiogram (06.12.15 @ 23:34) >  1. Tethered mitral valve leaflets with normal opening. Mild  mitral regurgitation.  2. Normal trileaflet aortic valve. No aortic valve  regurgitation seen.  3. Severely dilated left atrium.  LA volume index = 64  cc/m2.  4. Left ventricular enlargement.  5. Severe segmental left ventricular systolic dysfunction.  EF 25-30%. The inferior, infero-lateral and lateral walls  are akinetic. The rest of the walls are hypokinetic.  6. Reversal of the E-A  waves of the mitral inflow pattern  is consistent with diastolic LV dysfunction.  7. Normal right ventricular size and function.  A device  wire is noted in the right heart.  8. Estimated right ventricular systolic pressure equals 43  mm Hg, assuming right atrial pressure equals 10 mm Hg,  consistent with mild pulmonary hypertension.  9. Normal tricuspid valve. Mild tricuspid regurgitation.  ------------------------------------------------------------------------  Confirmed on  6/12/2015 - 14:23:12 by Tian Burkett MD  ------------------------------------------------------------------------    < end of copied text > 83 y/o male PMHx AICD/pacemaker (3 years ago), HTN, HLD, CAD/MI, DMT2 (diet controlled), EF of 25%, acid reflux, treated for prostate CA with radiation treatments feb 2016, s/p cholecystectomy in sep 2017, s/p stone removal in common bile duct in [facility]  in June 2018. Pt reports on 8/18 pt had his defibrillator fired while resting in the chair with lightheadedness preceding the incident, pt was evaluated with Dr Jeff and started on amiodarone 20mg daily with no further episodes of ICD firings.  Pt presents today for LHC. Pt had diagnostic LHC in Nov 2014 with Dr Huffman, showed occluded OM, critical RCA disease both mostly unchanged from prior cath (territories evaluated with nuc viability in 2012 and found not viable). Moderate LAD disease.  Pt denies any chest pain, dyspnea, dizziness, LE edema or weight gain or orthopnea recently.       Cards Dr Massey   PCP Dr Alves     < from: Cardiac Cath Lab - Adult (11.25.14 @ 16:12) >  CORONARY VESSELS: The coronary circulation is right dominant.  LM:   --  LM: There was a 25 % stenosis.  LAD:   --  Proximal LAD: There was a 50 % stenosis. The lesion was  eccentric and moderately calcified.  --  D1: Angiography showed minor luminal irregularities with no flow  limiting lesions.  CX:   --  Ostial circumflex: There was a 70 % stenosis.  --  OM1: There was a 100 % stenosis.  RCA:   --  Proximal RCA: There was a 99 % stenosis.  COMPLICATIONS: There were no complications.  DIAGNOSTIC IMPRESSIONS: Occluded OM, critical RCA disease both mostly  unchanged from prior cath (territories evaluated with nuc viability in  2012 and found not viable). Moderate LAD disease.  DIAGNOSTIC RECOMMENDATIONS: EP follow - up for VT. Aggressive medical  therapy for est CAD and LV dysfunction.  Prepared and signed by  Lorie Huffman M.D.    < end of copied text >      < from: Transthoracic Echocardiogram (06.12.15 @ 23:34) >  1. Tethered mitral valve leaflets with normal opening. Mild  mitral regurgitation.  2. Normal trileaflet aortic valve. No aortic valve  regurgitation seen.  3. Severely dilated left atrium.  LA volume index = 64  cc/m2.  4. Left ventricular enlargement.  5. Severe segmental left ventricular systolic dysfunction.  EF 25-30%. The inferior, infero-lateral and lateral walls  are akinetic. The rest of the walls are hypokinetic.  6. Reversal of the E-A  waves of the mitral inflow pattern  is consistent with diastolic LV dysfunction.  7. Normal right ventricular size and function.  A device  wire is noted in the right heart.  8. Estimated right ventricular systolic pressure equals 43  mm Hg, assuming right atrial pressure equals 10 mm Hg,  consistent with mild pulmonary hypertension.  9. Normal tricuspid valve. Mild tricuspid regurgitation.  ------------------------------------------------------------------------  Confirmed on  6/12/2015 - 14:23:12 by Tian Burkett MD  ------------------------------------------------------------------------    < end of copied text >

## 2018-08-23 ENCOUNTER — TRANSCRIPTION ENCOUNTER (OUTPATIENT)
Age: 83
End: 2018-08-23

## 2018-09-13 ENCOUNTER — APPOINTMENT (OUTPATIENT)
Dept: ELECTROPHYSIOLOGY | Facility: CLINIC | Age: 83
End: 2018-09-13
Payer: MEDICARE

## 2018-09-13 DIAGNOSIS — I47.2 VENTRICULAR TACHYCARDIA: ICD-10-CM

## 2018-09-13 PROCEDURE — 93295 DEV INTERROG REMOTE 1/2/MLT: CPT

## 2018-09-13 PROCEDURE — 93296 REM INTERROG EVL PM/IDS: CPT

## 2018-09-13 RX ORDER — AMIODARONE HYDROCHLORIDE 200 MG/1
200 TABLET ORAL DAILY
Refills: 0 | Status: ACTIVE | COMMUNITY
Start: 2018-09-13

## 2019-04-29 ENCOUNTER — TRANSCRIPTION ENCOUNTER (OUTPATIENT)
Age: 84
End: 2019-04-29

## 2019-11-12 ENCOUNTER — RESULT REVIEW (OUTPATIENT)
Age: 84
End: 2019-11-12

## 2020-01-07 ENCOUNTER — RESULT REVIEW (OUTPATIENT)
Age: 85
End: 2020-01-07

## 2020-02-20 ENCOUNTER — RESULT REVIEW (OUTPATIENT)
Age: 85
End: 2020-02-20

## 2020-03-16 ENCOUNTER — RESULT REVIEW (OUTPATIENT)
Age: 85
End: 2020-03-16

## 2023-09-14 NOTE — CHART NOTE - NSCHARTNOTEFT_GEN_A_CORE
Patient seen and examined feeling better.  NGT to LWS - with 350 cc output bilious/ 24 hours.  TONY drain 75 cc serosanguinous.  +flatus/BM                        11.8   8.7   )-----------( 174      ( 22 Sep 2017 06:52 )             35.4   09-22    137  |  103  |  44<H>  ----------------------------<  111<H>  3.8   |  25  |  1.19    Ca    8.1<L>      22 Sep 2017 06:52  Phos  3.0     09-22  Mg     2.6     09-22    TPro  6.5  /  Alb  2.6<L>  /  TBili  1.0  /  DBili  x   /  AST  28  /  ALT  35  /  AlkPhos  72  09-22    will discuss with Dr. Sheffield Re:  NG clamp, and timing of diet. Patient seen and examined feeling better.  NGT to LWS - with 350 cc output bilious/ 24 hours.  TONY drain 75 cc serosanguinous.  +flatus/BM                        11.8   8.7   )-----------( 174      ( 22 Sep 2017 06:52 )             35.4   09-22    137  |  103  |  44<H>  ----------------------------<  111<H>  3.8   |  25  |  1.19    Ca    8.1<L>      22 Sep 2017 06:52  Phos  3.0     09-22  Mg     2.6     09-22    TPro  6.5  /  Alb  2.6<L>  /  TBili  1.0  /  DBili  x   /  AST  28  /  ALT  35  /  AlkPhos  72  09-22    will discuss with Dr. Sheffield Re:  NG clamp, and timing of diet.    As discuss with Dr. Sheffield -- Clamg NGT and give clears.  Continue PPN. [[id number]]

## 2024-05-13 ENCOUNTER — APPOINTMENT (OUTPATIENT)
Dept: ORTHOPEDIC SURGERY | Facility: CLINIC | Age: 89
End: 2024-05-13
Payer: MEDICARE

## 2024-05-13 DIAGNOSIS — L03.032 CELLULITIS OF LEFT TOE: ICD-10-CM

## 2024-05-13 PROCEDURE — 99203 OFFICE O/P NEW LOW 30 MIN: CPT

## 2024-05-13 PROCEDURE — 73660 X-RAY EXAM OF TOE(S): CPT | Mod: LT

## 2024-05-13 RX ORDER — CEPHALEXIN 500 MG/1
500 TABLET ORAL 3 TIMES DAILY
Qty: 21 | Refills: 0 | Status: ACTIVE | COMMUNITY
Start: 2024-05-13 | End: 1900-01-01

## 2024-05-13 NOTE — DISCUSSION/SUMMARY
[de-identified] : Patient does not have an acute fracture. His cellulitis is improving with cephalexin which he will continue for another week. He was told to go to the ER if the redness gets worse and/or starts expanding in the foot. He will follow up with his pmd later this week.

## 2024-05-13 NOTE — HISTORY OF PRESENT ILLNESS
[0] : 0 [de-identified] : STEPHANIE ARRIAGA a 88 year old male here for evaluation of his left foot.  He reports some intermittent pain localized along the left second toe. He went to urgent care and was told that he had an infection and a 2nd toe middle phalanx fracture.  He was given cephalexin.  He reports swelling and redness are improved.  He still has swelling which is virtually gone in the morning, but progresses throughout the day. [] : no [FreeTextEntry1] : left foot

## 2024-05-13 NOTE — PHYSICAL EXAM
[NL (40)] : plantar flexion 40 degrees [5___] : eversion 5[unfilled]/5 [2+] : posterior tibialis pulse: 2+ [Normal] : saphenous nerve sensation normal [] : non-antalgic [Left] : left toe [Toe #: ____] : toe # [unfilled] [FreeTextEntry3] : erythema in 2nd toe extending just proximal to 2nd mtp joint. Rotational deformity of distal 2nd toe (chronic as per patient). [de-identified] : Old 2nd middle phalanx fracture with abduction deformity at 2nd DIP joint. [TWNoteComboBox7] : dorsiflexion 15 degrees [de-identified] : inversion 20 degrees [de-identified] : eversion 15 degrees

## 2025-07-25 NOTE — PATIENT PROFILE ADULT. - PRO INTERPRETER NEED 2
P/previous writer     Message Type:  Refill Medication   Is the medication pended:Yes  Medication name: DULoxetine (CYMBALTA) 30 MG capsule  Writer unable to refill d/t multiple does on chart    Message: Patient is requesting a higher dose of Hydroxyzine   Current Rx on file in pt chart :hydrOXYzine (ATARAX) 25 MG tablet   Preferred pharmacy verified, and selected.  [id number] - [address]   English